# Patient Record
Sex: FEMALE | Race: BLACK OR AFRICAN AMERICAN | NOT HISPANIC OR LATINO | Employment: OTHER | URBAN - METROPOLITAN AREA
[De-identification: names, ages, dates, MRNs, and addresses within clinical notes are randomized per-mention and may not be internally consistent; named-entity substitution may affect disease eponyms.]

---

## 2017-01-16 ENCOUNTER — HOSPITAL ENCOUNTER (OUTPATIENT)
Dept: RADIOLOGY | Facility: HOSPITAL | Age: 52
Discharge: HOME | End: 2017-01-16
Attending: INTERNAL MEDICINE
Payer: MEDICARE

## 2017-01-16 DIAGNOSIS — Z12.39 SCREENING FOR BREAST CANCER: ICD-10-CM

## 2017-01-16 PROCEDURE — 77067 SCR MAMMO BI INCL CAD: CPT | Mod: 26,,, | Performed by: RADIOLOGY

## 2017-01-16 PROCEDURE — 77063 BREAST TOMOSYNTHESIS BI: CPT | Mod: 26,,, | Performed by: RADIOLOGY

## 2017-01-16 PROCEDURE — 77067 SCR MAMMO BI INCL CAD: CPT | Mod: TC

## 2017-01-17 ENCOUNTER — TELEPHONE (OUTPATIENT)
Dept: INTERNAL MEDICINE | Facility: CLINIC | Age: 52
End: 2017-01-17

## 2017-01-18 NOTE — TELEPHONE ENCOUNTER
Spoke with pt and informed her of mmg results and also advised her of pcp rec to repeat mmg in one year.

## 2017-01-23 DIAGNOSIS — Z12.11 ENCOUNTER FOR COLONOSCOPY DUE TO HISTORY OF COLONIC POLYP: Primary | ICD-10-CM

## 2017-01-23 DIAGNOSIS — Z86.010 ENCOUNTER FOR COLONOSCOPY DUE TO HISTORY OF COLONIC POLYP: Primary | ICD-10-CM

## 2017-01-23 RX ORDER — POLYETHYLENE GLYCOL 3350, SODIUM CHLORIDE, SODIUM BICARBONATE, POTASSIUM CHLORIDE 420; 11.2; 5.72; 1.48 G/4L; G/4L; G/4L; G/4L
POWDER, FOR SOLUTION ORAL
Qty: 1 BOTTLE | Refills: 0 | Status: SHIPPED | OUTPATIENT
Start: 2017-01-23 | End: 2017-04-21

## 2017-01-25 ENCOUNTER — ANESTHESIA (OUTPATIENT)
Dept: ENDOSCOPY | Facility: HOSPITAL | Age: 52
End: 2017-01-25
Payer: MEDICARE

## 2017-01-25 ENCOUNTER — ANESTHESIA EVENT (OUTPATIENT)
Dept: ENDOSCOPY | Facility: HOSPITAL | Age: 52
End: 2017-01-25
Payer: MEDICARE

## 2017-01-25 PROBLEM — Z12.11 SCREEN FOR COLON CANCER: Status: ACTIVE | Noted: 2017-01-25

## 2017-01-25 PROCEDURE — D9220A PRA ANESTHESIA: Mod: 33,CRNA,, | Performed by: NURSE ANESTHETIST, CERTIFIED REGISTERED

## 2017-01-25 PROCEDURE — 63600175 PHARM REV CODE 636 W HCPCS: Performed by: NURSE ANESTHETIST, CERTIFIED REGISTERED

## 2017-01-25 PROCEDURE — D9220A PRA ANESTHESIA: Mod: 33,ANES,, | Performed by: ANESTHESIOLOGY

## 2017-01-25 PROCEDURE — 25000003 PHARM REV CODE 250: Performed by: NURSE ANESTHETIST, CERTIFIED REGISTERED

## 2017-01-25 RX ORDER — PROPOFOL 10 MG/ML
VIAL (ML) INTRAVENOUS
Status: DISCONTINUED | OUTPATIENT
Start: 2017-01-25 | End: 2017-01-25

## 2017-01-25 RX ORDER — SODIUM CHLORIDE 9 MG/ML
INJECTION, SOLUTION INTRAVENOUS CONTINUOUS PRN
Status: DISCONTINUED | OUTPATIENT
Start: 2017-01-25 | End: 2017-01-25

## 2017-01-25 RX ORDER — PROPOFOL 10 MG/ML
VIAL (ML) INTRAVENOUS CONTINUOUS PRN
Status: DISCONTINUED | OUTPATIENT
Start: 2017-01-25 | End: 2017-01-25

## 2017-01-25 RX ORDER — LIDOCAINE HCL/PF 100 MG/5ML
SYRINGE (ML) INTRAVENOUS
Status: DISCONTINUED | OUTPATIENT
Start: 2017-01-25 | End: 2017-01-25

## 2017-01-25 RX ADMIN — SODIUM CHLORIDE: 0.9 INJECTION, SOLUTION INTRAVENOUS at 10:01

## 2017-01-25 RX ADMIN — LIDOCAINE HYDROCHLORIDE 100 MG: 20 INJECTION, SOLUTION INTRAVENOUS at 11:01

## 2017-01-25 RX ADMIN — PROPOFOL 150 MCG/KG/MIN: 10 INJECTION, EMULSION INTRAVENOUS at 11:01

## 2017-01-25 RX ADMIN — PROPOFOL 80 MG: 10 INJECTION, EMULSION INTRAVENOUS at 11:01

## 2017-01-25 RX ADMIN — PROPOFOL 30 MG: 10 INJECTION, EMULSION INTRAVENOUS at 11:01

## 2017-01-25 NOTE — ANESTHESIA PREPROCEDURE EVALUATION
01/25/2017  Caitlyn Perez is a 51 y.o., female.    [facility] Anesthesia Evaluation    I have reviewed the Patient Summary Reports.        Review of Systems  Anesthesia Hx:  No problems with previous Anesthesia    Social:  Non-Smoker    Hematology/Oncology:  Hematology Normal   Oncology Normal     EENT/Dental:EENT/Dental Normal   Cardiovascular:   Hypertension    Pulmonary:  Pulmonary Normal    Renal/:  Renal/ Normal     Hepatic/GI:  Hepatic/GI Normal    Musculoskeletal:  Musculoskeletal Normal    Neurological:   CVA    Endocrine:   Diabetes, type 2    Dermatological:  Skin Normal    Psych:  Psychiatric Normal           Physical Exam  General:  Well nourished    Airway/Jaw/Neck:  Airway Findings: Mouth Opening: Normal Tongue: Normal  General Airway Assessment: Adult  Mallampati: II  Improves to II with phonation.  TM Distance: Normal, at least 6 cm  Jaw/Neck Findings:  Neck ROM: Normal ROM      Dental:  Dental Findings: In tact   Chest/Lungs:  Chest/Lungs Findings: Clear to auscultation, Normal Respiratory Rate     Heart/Vascular:  Heart Findings: Rate: Normal  Rhythm: Regular Rhythm  Sounds: Normal             Anesthesia Plan  Type of Anesthesia, risks & benefits discussed:  Anesthesia Type:  general  Patient's Preference: General  Intra-op Monitoring Plan:   Intra-op Monitoring Plan Comments:   Post Op Pain Control Plan:   Post Op Pain Control Plan Comments:   Induction:   IV  Beta Blocker:  Patient is not currently on a Beta-Blocker (No further documentation required).       Informed Consent: Patient understands risks and agrees with Anesthesia plan.  Questions answered. Anesthesia consent signed with patient.  ASA Score: 3     Day of Surgery Review of History & Physical: I have interviewed and examined the patient. I have reviewed the patient's H&P dated:  There are no significant changes.          Ready For  Surgery From Anesthesia Perspective.

## 2017-01-25 NOTE — TRANSFER OF CARE
"Anesthesia Transfer of Care Note    Patient: Caitlyn Perez    Procedure(s) Performed: Procedure(s) (LRB):  COLONOSCOPY (N/A)    Patient location: PACU    Anesthesia Type: general    Transport from OR: Transported from OR on room air with adequate spontaneous ventilation    Post pain: adequate analgesia    Post assessment: no apparent anesthetic complications    Post vital signs: stable    Level of consciousness: sedated    Nausea/Vomiting: no nausea/vomiting    Complications: none          Last vitals:   Visit Vitals    BP (!) 144/81 (BP Location: Left arm, Patient Position: Lying, BP Method: Automatic)    Pulse 84    Temp 36.8 °C (98.2 °F) (Oral)    Resp 18    Ht 5' 4" (1.626 m)    Wt 90.7 kg (200 lb)    SpO2 98%    Breastfeeding No    BMI 34.33 kg/m2     "

## 2017-01-26 NOTE — ANESTHESIA RELEASE NOTE
"Anesthesia Release from PACU Note    Patient: Caitlyn Perez    Procedure(s) Performed: Procedure(s) (LRB):  COLONOSCOPY (N/A)    Anesthesia type: GEN    Post pain: Adequate analgesia reported    Post assessment: no apparent anesthetic complications, tolerated procedure well and no evidence of recall    Post vital signs:   Visit Vitals    /76    Pulse 76    Temp 36.7 °C (98 °F)    Resp 18    Ht 5' 4" (1.626 m)    Wt 90.7 kg (200 lb)    SpO2 100%    Breastfeeding No    BMI 34.33 kg/m2       Level of consciousness: awake, alert and oriented    Nausea/Vomiting: no nausea/no vomiting    Complications: none    Airway Patency: patent    Respiratory: unassisted, spontaneous ventilation, room air    Cardiovascular: stable and blood pressure at baseline    Hydration: euvolemic    "

## 2017-01-31 ENCOUNTER — PATIENT OUTREACH (OUTPATIENT)
Dept: ADMINISTRATIVE | Facility: HOSPITAL | Age: 52
End: 2017-01-31

## 2017-01-31 RX ORDER — AMLODIPINE BESYLATE 10 MG/1
TABLET ORAL
Refills: 0 | COMMUNITY
Start: 2016-12-13 | End: 2017-02-13

## 2017-02-01 ENCOUNTER — TELEPHONE (OUTPATIENT)
Dept: ENDOSCOPY | Facility: HOSPITAL | Age: 52
End: 2017-02-01

## 2017-02-13 ENCOUNTER — OFFICE VISIT (OUTPATIENT)
Dept: INTERNAL MEDICINE | Facility: CLINIC | Age: 52
End: 2017-02-13
Attending: INTERNAL MEDICINE
Payer: MEDICARE

## 2017-02-13 ENCOUNTER — LAB VISIT (OUTPATIENT)
Dept: LAB | Facility: OTHER | Age: 52
End: 2017-02-13
Attending: INTERNAL MEDICINE
Payer: MEDICARE

## 2017-02-13 VITALS
HEIGHT: 64 IN | DIASTOLIC BLOOD PRESSURE: 88 MMHG | SYSTOLIC BLOOD PRESSURE: 128 MMHG | OXYGEN SATURATION: 99 % | WEIGHT: 208.56 LBS | BODY MASS INDEX: 35.61 KG/M2 | HEART RATE: 80 BPM

## 2017-02-13 DIAGNOSIS — Z00.00 ANNUAL PHYSICAL EXAM: Primary | ICD-10-CM

## 2017-02-13 DIAGNOSIS — Z86.73 HISTORY OF CVA (CEREBROVASCULAR ACCIDENT): ICD-10-CM

## 2017-02-13 DIAGNOSIS — E66.09 NON MORBID OBESITY DUE TO EXCESS CALORIES: ICD-10-CM

## 2017-02-13 DIAGNOSIS — E78.00 PURE HYPERCHOLESTEROLEMIA: ICD-10-CM

## 2017-02-13 DIAGNOSIS — E21.3 HYPERPARATHYROIDISM: ICD-10-CM

## 2017-02-13 DIAGNOSIS — R35.0 FREQUENCY OF URINATION: ICD-10-CM

## 2017-02-13 DIAGNOSIS — G81.10 SPASTIC HEMIPARESIS AFFECTING DOMINANT SIDE: ICD-10-CM

## 2017-02-13 DIAGNOSIS — R26.9 ABNORMALITY OF GAIT: ICD-10-CM

## 2017-02-13 DIAGNOSIS — G81.10 SPASTIC HEMIPARESIS: ICD-10-CM

## 2017-02-13 DIAGNOSIS — I69.30 HISTORY OF CVA WITH RESIDUAL DEFICIT: ICD-10-CM

## 2017-02-13 DIAGNOSIS — R79.89 ELEVATED LFTS: ICD-10-CM

## 2017-02-13 DIAGNOSIS — R73.03 PREDIABETES: ICD-10-CM

## 2017-02-13 DIAGNOSIS — I10 ESSENTIAL HYPERTENSION: ICD-10-CM

## 2017-02-13 LAB
ALBUMIN SERPL BCP-MCNC: 3.7 G/DL
ALP SERPL-CCNC: 175 U/L
ALT SERPL W/O P-5'-P-CCNC: 57 U/L
ANION GAP SERPL CALC-SCNC: 10 MMOL/L
AST SERPL-CCNC: 35 U/L
BILIRUB SERPL-MCNC: 0.5 MG/DL
BUN SERPL-MCNC: 14 MG/DL
CALCIUM SERPL-MCNC: 10.1 MG/DL
CHLORIDE SERPL-SCNC: 107 MMOL/L
CO2 SERPL-SCNC: 23 MMOL/L
CREAT SERPL-MCNC: 1.1 MG/DL
EST. GFR  (AFRICAN AMERICAN): >60 ML/MIN/1.73 M^2
EST. GFR  (NON AFRICAN AMERICAN): 57.9 ML/MIN/1.73 M^2
GLUCOSE SERPL-MCNC: 70 MG/DL
POTASSIUM SERPL-SCNC: 4.2 MMOL/L
PROT SERPL-MCNC: 7.1 G/DL
SODIUM SERPL-SCNC: 140 MMOL/L

## 2017-02-13 PROCEDURE — 3079F DIAST BP 80-89 MM HG: CPT | Mod: S$GLB,,, | Performed by: INTERNAL MEDICINE

## 2017-02-13 PROCEDURE — 36415 COLL VENOUS BLD VENIPUNCTURE: CPT

## 2017-02-13 PROCEDURE — 3074F SYST BP LT 130 MM HG: CPT | Mod: S$GLB,,, | Performed by: INTERNAL MEDICINE

## 2017-02-13 PROCEDURE — 99999 PR PBB SHADOW E&M-EST. PATIENT-LVL III: CPT | Mod: PBBFAC,,, | Performed by: INTERNAL MEDICINE

## 2017-02-13 PROCEDURE — 99396 PREV VISIT EST AGE 40-64: CPT | Mod: S$GLB,,, | Performed by: INTERNAL MEDICINE

## 2017-02-13 PROCEDURE — 80053 COMPREHEN METABOLIC PANEL: CPT

## 2017-02-13 PROCEDURE — 99499 UNLISTED E&M SERVICE: CPT | Mod: S$GLB,,, | Performed by: INTERNAL MEDICINE

## 2017-02-13 RX ORDER — VALSARTAN 160 MG/1
160 TABLET ORAL DAILY
Qty: 90 TABLET | Refills: 2 | Status: SHIPPED | OUTPATIENT
Start: 2017-02-13 | End: 2017-09-12 | Stop reason: SDUPTHER

## 2017-02-13 RX ORDER — PRAVASTATIN SODIUM 40 MG/1
40 TABLET ORAL NIGHTLY
Qty: 90 TABLET | Refills: 2 | Status: SHIPPED | OUTPATIENT
Start: 2017-02-13 | End: 2017-04-21

## 2017-02-13 NOTE — MR AVS SNAPSHOT
[facility]  [address]  [facility] [address]  Phone: [phone]  Fax: [phone]                  Caitlyn Perez   2017 9:40 AM   Office Visit    Description:  Female : 1965   Provider:  Jeanette Delacruz MD   Department:  [facility]           Reason for Visit     Annual Exam           Diagnoses this Visit        Comments    Annual physical exam    -  Primary     Spastic hemiparesis affecting dominant side         History of CVA (cerebrovascular accident)         Prediabetes         Non morbid obesity due to excess calories         Hyperparathyroidism         Pure hypercholesterolemia         Essential hypertension         Spastic hemiparesis         History of CVA with residual deficit         Abnormality of gait         Obesity, Class II, BMI 35-39.9, with comorbidity         Frequency of urination         Elevated LFTs                To Do List           Future Appointments        Provider Department Dept Phone    2017 10:15 AM LAB, [facility] [phone]    2017 10:30 AM LAB, [facility] [phone]    2017 9:00 AM MD Garret Blanco [facility] - Endo/Diab/Metab [phone]    2017 10:40 AM MD Garret Fierro [facility] [phone]      Goals (5 Years of Data)     None       These Medications        Disp Refills Start End    pravastatin (PRAVACHOL) 40 MG tablet 90 tablet 2 2017     Take 1 tablet (40 mg total) by mouth every evening. - Oral    Pharmacy: [facility] [address] Ph #: [phone]       valsartan (DIOVAN) 160 MG tablet 90 tablet 2 2017     Take 1 tablet (160 mg total) by mouth once daily. - Oral    Pharmacy: [facility] [address] Ph #: [phone]         [facility]      "[facility] Nurse Care [facility] - 24/7 Assistance  Registered nurses in the [facility] provide clinical advisement, health education, appointment booking, and other advisory services.  Call for this free service at [phone].             Medications           Message regarding Medications     Verify the changes and/or additions to your medication regime listed below are the same as discussed with your clinician today.  If any of these changes or additions are incorrect, please notify your healthcare provider.        STOP taking these medications     amlodipine (NORVASC) 10 MG tablet            Verify that the below list of medications is an accurate representation of the medications you are currently taking.  If none reported, the list may be blank. If incorrect, please contact your healthcare provider. Carry this list with you in case of emergency.           Current Medications     aspirin (ECOTRIN) 81 MG EC tablet Take 81 mg by mouth once daily.    bisacodyl (DULCOLAX) 5 mg EC tablet Take 5 mg by mouth daily as needed for Constipation.    blood sugar diagnostic Strp True Test blood glucose test strips & lancets. Check blood sugars daily. 3 month supply.    CYANOCOBALAMIN, VITAMIN B-12, (VITAMIN B-12 ORAL) Take 1 tablet by mouth once daily.    KRILL OIL ORAL Take 1 capsule by mouth once daily.    MULTIVITAMIN W-MINERALS/LUTEIN (CENTRUM SILVER ORAL) Take 1 tablet by mouth once daily.    peg-electrolyte soln (TRILYTE WITH FLAVOR PACKETS) 420 gram SolR As directed    pravastatin (PRAVACHOL) 40 MG tablet Take 1 tablet (40 mg total) by mouth every evening.    valsartan (DIOVAN) 160 MG tablet Take 1 tablet (160 mg total) by mouth once daily.           Clinical Reference Information           Your Vitals Were     BP Pulse Height Weight SpO2 BMI    128/88 (BP Location: Left arm, Patient Position: Sitting, BP Method: Manual) 80 5' 4" (1.626 m) 94.6 kg (208 lb 8.9 oz) 99% 35.8 kg/m2      Blood Pressure       "    Most Recent Value    BP  128/88      Allergies as of 2/13/2017     No Known Allergies      Immunizations Administered on Date of Encounter - 2/13/2017     None      Orders Placed During Today's Visit      Normal Orders This Visit    Ambulatory consult to Endocrinology     Ambulatory referral to Physical Medicine Rehab     Urine culture     Future Labs/Procedures Expected by Expires    Comprehensive metabolic panel  2/13/2017 2/13/2018    PTH, intact  2/13/2017 4/14/2018    Urinalysis  2/13/2017 4/14/2018    Urine culture  2/13/2017 4/14/2018    Urinalysis Microscopic  As directed 2/13/2018      [facility] Sign-Up     Activating your [facility] account is as easy as 1-2-3!     1) Visit [email or web address], select Sign Up Now, enter this activation code and your date of birth, then select Next.  [email or web address]  Expires: 3/11/2017 10:27 AM      2) Create a username and password to use when you visit [facility] in the future and select a security question in case you lose your password and select Next.    3) Enter your e-mail address and click Sign Up!    Additional Information  If you have questions, please e-mail [email or web address] or call [phone] to talk to our [facility] staff. Remember, [facility] is NOT to be used for urgent needs. For medical emergencies, dial 911.         Language Assistance Services     ATTENTION: Language assistance services are available, free of charge. Please call [phone].      ATENCIÓN: Si habla español, tiene a singer disposición servicios gratuitos de asistencia lingüística. Llame al [phone].     CHÚ Ý: N?u b?n nói Ti?ng Vi?t, có các d?ch v? h? tr? ngôn ng? mi?n phí dành cho b?n. G?i s? [phone].         [facility] complies with applicable Federal civil rights laws and does not discriminate on the basis of race, color, national origin, age, disability, or sex.

## 2017-02-13 NOTE — PROGRESS NOTES
"Subjective:       Patient ID: Caitlyn Perez is a 51 y.o. female.    Chief Complaint: Annual Exam     Caitlyn Perez is a 51 y.o.  female who presents for Annual Exam  .  Patient Active Problem List   Diagnosis    Essential hypertension    Pure hypercholesterolemia    Hyperparathyroidism    Prediabetes    Peripheral neuropathy    History of abnormal Pap smear    Spastic hemiparesis    Central pain syndrome    Abnormality of gait    Obesity, Class II, BMI 35-39.9, with comorbidity    Screen for colon cancer    History of CVA with residual deficit    Elevated LFTs     HPI Comments: History of hyperparathyroidism. Last seen by octavio in 2015, did not keep follow up apt. At that time not candidate for surgery.    C/o odor "from my body" has been an issue for several years.  No douching. Not sexually active.  + frequency and occasional urge incontinence.   History of prediabetes- exercising and attempting to follow healthy diet. Gradually increasing hba1c, last 6.1.     History of CVA with residual left sided spastic hemiparesis, left upper extremity. Concerned that her hand is starting to "curl up". Has not seen PMR in several years. No falls, rides a bike.  Hypertension   This is a chronic problem. The problem is unchanged. The problem is controlled. Pertinent negatives include no chest pain, orthopnea, palpitations, PND or shortness of breath. Risk factors for coronary artery disease include sedentary lifestyle, obesity, post-menopausal state and dyslipidemia. Past treatments include angiotensin blockers. The current treatment provides significant improvement. Compliance problems include psychosocial issues.  Hypertensive end-organ damage includes CVA.   Hyperlipidemia   This is a chronic problem. The current episode started more than 1 year ago. The problem is controlled. Pertinent negatives include no chest pain, myalgias or shortness of breath. Current antihyperlipidemic treatment includes statins. Risk " factors for coronary artery disease include post-menopausal, a sedentary lifestyle, obesity and dyslipidemia.     Health Maintenance       Date Due Completion Date    Pneumococcal PPSV23 (Medium Risk) (1) 1982 ---    Hemoglobin A1c 2016    Pap Smear 2017    Eye Exam 2017 (Done)    Override on 2016: Done    Override on 2014: Done (seen by Dr. Salomon Romero, per note faxed to office)    Foot Exam 2017 (Done)    Override on 2016: Done    Override on 10/14/2015: Done    Lipid Panel 2017    Mammogram 2018    Colonoscopy 2020    Override on 2014: Done    TETANUS VACCINE 2026          Review of Systems   Constitutional: Negative for chills and fever.   Respiratory: Negative for cough and shortness of breath.    Cardiovascular: Negative for chest pain, palpitations, orthopnea and PND.   Gastrointestinal: Negative for nausea and vomiting.   Genitourinary: Negative for dysuria and hematuria.        + frequency, chronic   Musculoskeletal: Negative for myalgias.         Past Medical History:   Diagnosis Date    Aneurysm     CVA (cerebral infarction) 2012    Diabetes mellitus, type 2     Family history of breast cancer     Hypertension     Parathyroid disease     Stroke     Lt sided weakness       Past Surgical History:   Procedure Laterality Date     SECTION      x 4    COLONOSCOPY N/A 2016    Procedure: COLONOSCOPY;  Surgeon: Gio Bettencourt MD;  Location: [address]);  Service: Endoscopy;  Laterality: N/A;    COLONOSCOPY N/A 2017    Procedure: COLONOSCOPY;  Surgeon: Gio Bettencourt MD;  Location: [facility] ENDO ([address] [place] [address]);  Service: Endoscopy;  Laterality: N/A;    TUBAL LIGATION         Family History   Problem Relation Age of Onset    Diabetes type II Mother     Kidney disease Mother      on HD due to diabetes    Hypertension Mother   "   Diabetes Mother     Breast cancer Father     Breast cancer Paternal Uncle     Breast cancer Paternal Aunt 17    Lung cancer Maternal Aunt     Diabetes Maternal Aunt     Diabetes Brother     Diabetes Maternal Uncle     Diabetes Maternal Aunt     Diabetes Maternal Aunt     Diabetes Maternal Aunt     Diabetes Maternal Uncle     Diabetes Maternal Uncle     Diabetes Maternal Uncle        Social History   Substance Use Topics    Smoking status: Never Smoker    Smokeless tobacco: Never Used    Alcohol use No             Objective:   Blood pressure 128/88, pulse 80, height 5' 4" (1.626 m), weight 94.6 kg (208 lb 8.9 oz), SpO2 99 %.     Physical Exam   Constitutional: She is oriented to person, place, and time. She appears well-developed and well-nourished. No distress.   HENT:   Head: Normocephalic and atraumatic.   Right Ear: External ear normal.   Left Ear: External ear normal.   Eyes: Conjunctivae are normal. No scleral icterus.   Neck: No JVD present. No thyromegaly present.   Cardiovascular: Normal heart sounds.  Exam reveals no gallop and no friction rub.    No murmur heard.  Pulmonary/Chest: Effort normal and breath sounds normal. She has no wheezes. She has no rales.   Abdominal: Soft. Bowel sounds are normal. She exhibits no distension. There is no tenderness.   Musculoskeletal: She exhibits no edema or tenderness.   Lymphadenopathy:     She has no cervical adenopathy.   Neurological: She is alert and oriented to person, place, and time.   Skin: Skin is warm and dry.   Psychiatric: She has a normal mood and affect. Thought content normal.       Prior labs reviewed  Assessment/Plan:        Caitlyn was seen today for annual exam.    Diagnoses and all orders for this visit:    Annual physical exam  Recommend daily sunscreen, cardiovascular exercise min 30 min 5 days per week. Seatbelts routinely.    Spastic hemiparesis affecting dominant side  -     Ambulatory referral to Physical Medicine " Rehab    History of CVA (cerebrovascular accident)  -     Ambulatory referral to Physical Medicine Rehab    Hyperparathyroidism  -     Comprehensive metabolic panel; Future  -     PTH, intact; Future  -     Ambulatory consult to Endocrinology    Essential hypertension  Well controlled  Cont current BP medication(s) and low salt diet    Prediabetes  Non morbid obesity due to excess calories  - rec diet and exercise  - increase intensity and duration of CV exercise to continue weight loss  - goal wt loss one pound per week  - portion control, healthy choices      Pure hypercholesterolemia  -  well controlled  - cont current medication  - recommend low cholesterol diet and regular cardiovascular exercise to reduce risk of cardiovascular events  - repeat lipid profile and CMP annually    Spastic hemiparesis  As above    History of CVA with residual deficit    Abnormality of gait    Obesity, Class II, BMI 35-39.9, with comorbidity    Frequency of urination  -     Urine culture; Future  -     Urinalysis; Future  -     Urinalysis Microscopic; Future  -     Urine culture  -     Urinalysis    Elevated LFTs  monitor  Other orders  -     pravastatin (PRAVACHOL) 40 MG tablet; Take 1 tablet (40 mg total) by mouth every evening.  -     valsartan (DIOVAN) 160 MG tablet; Take 1 tablet (160 mg total) by mouth once daily.  -     Urinalysis Microscopic

## 2017-02-14 ENCOUNTER — TELEPHONE (OUTPATIENT)
Dept: INTERNAL MEDICINE | Facility: CLINIC | Age: 52
End: 2017-02-14

## 2017-02-14 LAB
BACTERIA #/AREA URNS AUTO: NORMAL /HPF
BILIRUB UR QL STRIP: NEGATIVE
CLARITY UR REFRACT.AUTO: CLEAR
COLOR UR AUTO: YELLOW
GLUCOSE UR QL STRIP: NEGATIVE
HGB UR QL STRIP: NEGATIVE
HYALINE CASTS UR QL AUTO: 0 /LPF
KETONES UR QL STRIP: NEGATIVE
LEUKOCYTE ESTERASE UR QL STRIP: NEGATIVE
MICROSCOPIC COMMENT: NORMAL
NITRITE UR QL STRIP: NEGATIVE
PH UR STRIP: 6 [PH] (ref 5–8)
PROT UR QL STRIP: ABNORMAL
RBC #/AREA URNS AUTO: 1 /HPF (ref 0–4)
SP GR UR STRIP: 1.02 (ref 1–1.03)
SQUAMOUS #/AREA URNS AUTO: 1 /HPF
URN SPEC COLLECT METH UR: ABNORMAL
UROBILINOGEN UR STRIP-ACNC: NEGATIVE EU/DL
WBC #/AREA URNS AUTO: 2 /HPF (ref 0–5)

## 2017-02-14 NOTE — TELEPHONE ENCOUNTER
Spoke to Jennifer in labs states that Diamante spoke to Kunal and the issue was resolved    No further questions or concerns at this time

## 2017-02-15 LAB — BACTERIA UR CULT: NORMAL

## 2017-02-16 ENCOUNTER — TELEPHONE (OUTPATIENT)
Dept: INTERNAL MEDICINE | Facility: CLINIC | Age: 52
End: 2017-02-16

## 2017-02-16 RX ORDER — SULFAMETHOXAZOLE AND TRIMETHOPRIM 800; 160 MG/1; MG/1
1 TABLET ORAL 2 TIMES DAILY
Qty: 6 TABLET | Refills: 0 | Status: SHIPPED | OUTPATIENT
Start: 2017-02-16 | End: 2017-02-19

## 2017-02-16 NOTE — TELEPHONE ENCOUNTER
Please let pt know that her urine tests did show a small amount of bacteria but no infection. Because she is having symptoms we should treat with antibiotics.

## 2017-02-21 NOTE — TELEPHONE ENCOUNTER
Left a message on pt's voicemail informing pt of urine results and antibiotic rx.    Pt has no further questions at this time.

## 2017-04-20 ENCOUNTER — TELEPHONE (OUTPATIENT)
Dept: OBSTETRICS AND GYNECOLOGY | Facility: CLINIC | Age: 52
End: 2017-04-20

## 2017-04-21 ENCOUNTER — OFFICE VISIT (OUTPATIENT)
Dept: OBSTETRICS AND GYNECOLOGY | Facility: CLINIC | Age: 52
End: 2017-04-21
Payer: MEDICARE

## 2017-04-21 VITALS
WEIGHT: 206.81 LBS | DIASTOLIC BLOOD PRESSURE: 74 MMHG | SYSTOLIC BLOOD PRESSURE: 140 MMHG | HEIGHT: 64 IN | BODY MASS INDEX: 35.31 KG/M2

## 2017-04-21 DIAGNOSIS — N89.8 VAGINAL ODOR: Primary | ICD-10-CM

## 2017-04-21 LAB
CANDIDA RRNA VAG QL PROBE: NEGATIVE
G VAGINALIS RRNA GENITAL QL PROBE: NEGATIVE
T VAGINALIS RRNA GENITAL QL PROBE: NEGATIVE

## 2017-04-21 PROCEDURE — 99999 PR PBB SHADOW E&M-EST. PATIENT-LVL III: CPT | Mod: PBBFAC,,, | Performed by: ADVANCED PRACTICE MIDWIFE

## 2017-04-21 PROCEDURE — 99213 OFFICE O/P EST LOW 20 MIN: CPT | Mod: S$GLB,,, | Performed by: ADVANCED PRACTICE MIDWIFE

## 2017-04-21 PROCEDURE — 3077F SYST BP >= 140 MM HG: CPT | Mod: S$GLB,,, | Performed by: ADVANCED PRACTICE MIDWIFE

## 2017-04-21 PROCEDURE — 1160F RVW MEDS BY RX/DR IN RCRD: CPT | Mod: S$GLB,,, | Performed by: ADVANCED PRACTICE MIDWIFE

## 2017-04-21 PROCEDURE — 3078F DIAST BP <80 MM HG: CPT | Mod: S$GLB,,, | Performed by: ADVANCED PRACTICE MIDWIFE

## 2017-04-21 PROCEDURE — 87480 CANDIDA DNA DIR PROBE: CPT

## 2017-04-21 RX ORDER — TRAMADOL HYDROCHLORIDE 50 MG/1
TABLET ORAL
Refills: 0 | COMMUNITY
Start: 2017-04-10 | End: 2018-01-31

## 2017-04-21 RX ORDER — HYDROCODONE BITARTRATE AND ACETAMINOPHEN 5; 325 MG/1; MG/1
TABLET ORAL
Refills: 0 | COMMUNITY
Start: 2017-04-06 | End: 2018-01-17

## 2017-04-21 RX ORDER — NAPROXEN 375 MG/1
TABLET ORAL
Refills: 0 | COMMUNITY
Start: 2017-04-10 | End: 2018-01-31

## 2017-04-21 NOTE — MR AVS SNAPSHOT
[facility] [address]  [address]  [address]  [facility] [address]  Phone: [phone]                  aCitlyn Perez   2017 3:45 PM   Office Visit    Description:  Female : 1965   Provider:  Snow Will CNM   Department:  [facility] [address]           Reason for Visit     Vaginal Discharge                To Do List           Future Appointments        Provider Department Dept Phone    2017 3:45 PM Snow Will CNM [facility] [address] [phone]      Goals (5 Years of Data)     None      [facility]     [facility] On Call Nurse Care Line -  Assistance  Unless otherwise directed by your provider, please contact [facility], our nurse care line that is available for  assistance.     Registered nurses in the [facility] On Call Center provide: appointment scheduling, clinical advisement, health education, and other advisory services.  Call: [phone] (toll free)               Medications           Message regarding Medications     Verify the changes and/or additions to your medication regime listed below are the same as discussed with your clinician today.  If any of these changes or additions are incorrect, please notify your healthcare provider.        STOP taking these medications     pravastatin (PRAVACHOL) 40 MG tablet Take 1 tablet (40 mg total) by mouth every evening.    peg-electrolyte soln (TRILYTE WITH FLAVOR PACKETS) 420 gram SolR As directed           Verify that the below list of medications is an accurate representation of the medications you are currently taking.  If none reported, the list may be blank. If incorrect, please contact your healthcare provider. Carry this list with you in case of emergency.           Current Medications     aspirin (ECOTRIN) 81 MG EC tablet Take 81 mg by mouth once daily.    bisacodyl (DULCOLAX) 5 mg EC tablet Take 5 mg by mouth daily as needed for Constipation.    blood sugar diagnostic Strp True Test  "blood glucose test strips & lancets. Check blood sugars daily. 3 month supply.    CYANOCOBALAMIN, VITAMIN B-12, (VITAMIN B-12 ORAL) Take 1 tablet by mouth once daily.    hydrocodone-acetaminophen 5-325mg (NORCO) 5-325 mg per tablet TK 1 T PO  Q 6 H PRF PAIN    KRILL OIL ORAL Take 1 capsule by mouth once daily.    MULTIVITAMIN W-MINERALS/LUTEIN (CENTRUM SILVER ORAL) Take 1 tablet by mouth once daily.    naproxen (NAPROSYN) 375 MG tablet TK ONE T PO TID WITH FOOD PRF PAIN    tramadol (ULTRAM) 50 mg tablet TK 1  T   PO Q 6 HOURS PRN FOR PAIN    valsartan (DIOVAN) 160 MG tablet Take 1 tablet (160 mg total) by mouth once daily.           Clinical Reference Information           Your Vitals Were     BP Height Weight BMI       140/74 5' 4" (1.626 m) 93.8 kg (206 lb 12.7 oz) 35.5 kg/m2       Blood Pressure          Most Recent Value    BP  (!)  140/74      Allergies as of 4/21/2017     No Known Allergies      Immunizations Administered on Date of Encounter - 4/21/2017     None      [facility] Sign-Up     Activating your [facility] account is as easy as 1-2-3!     1) Visit [email or web address], select Sign Up Now, enter this activation code and your date of birth, then select Next.  [email or web address]  Expires: 6/5/2017  2:54 PM      2) Create a username and password to use when you visit [facility] in the future and select a security question in case you lose your password and select Next.    3) Enter your e-mail address and click Sign Up!    Additional Information  If you have questions, please e-mail [email or web address] or call [phone] to talk to our [facility] staff. Remember, [facility] is NOT to be used for urgent needs. For medical emergencies, dial 911.         Language Assistance Services     ATTENTION: Language assistance services are available, free of charge. Please call [phone].      ATENCIÓN: Si habla español, tiene a singer disposición servicios gratuitos de asistencia lingüística. Llame al " [phone].     [place] Ý: N?u b?n nói Ti?ng Vi?t, có các d?ch v? h? tr? ngôn ng? mi?n phí dành cho b?n. G?i s? [phone].         [facility] [address] complies with applicable Federal civil rights laws and does not discriminate on the basis of race, color, national origin, age, disability, or sex.

## 2017-04-23 NOTE — PROGRESS NOTES
Caitlyn Perez is a 51 y.o. female  [facility] with complaint of vaginal odor for 1 week.  She denies itching, and reports odor.  She states the discharge is clear.          ROS:  GENERAL: No fever, chills, fatigability or weight loss.  VULVAR: No pain, no lesions and no itching.  VAGINAL: No relaxation, no abnormal bleeding and no lesions. Reports discharge and odor  ABDOMEN: No abdominal pain. Denies nausea. Denies vomiting. No diarrhea. No constipation  BREAST: Denies pain. No lumps. No discharge.  URINARY: No incontinence, no nocturia, no frequency and no dysuria.  CARDIOVASCULAR: No chest pain. No shortness of breath. No leg cramps.  NEUROLOGICAL: No headaches. No vision changes.      Review of patient's allergies indicates:  No Known Allergies    Current Outpatient Prescriptions:     aspirin (ECOTRIN) 81 MG EC tablet, Take 81 mg by mouth once daily., Disp: , Rfl:     bisacodyl (DULCOLAX) 5 mg EC tablet, Take 5 mg by mouth daily as needed for Constipation., Disp: , Rfl:     CYANOCOBALAMIN, VITAMIN B-12, (VITAMIN B-12 ORAL), Take 1 tablet by mouth once daily., Disp: , Rfl:     hydrocodone-acetaminophen 5-325mg (NORCO) 5-325 mg per tablet, TK 1 T PO  Q 6 H PRF PAIN, Disp: , Rfl: 0    KRILL OIL ORAL, Take 1 capsule by mouth once daily., Disp: , Rfl:     MULTIVITAMIN W-MINERALS/LUTEIN (CENTRUM SILVER ORAL), Take 1 tablet by mouth once daily., Disp: , Rfl:     naproxen (NAPROSYN) 375 MG tablet, TK ONE T PO TID WITH FOOD PRF PAIN, Disp: , Rfl: 0    tramadol (ULTRAM) 50 mg tablet, TK 1  T   PO Q 6 HOURS PRN FOR PAIN, Disp: , Rfl: 0    valsartan (DIOVAN) 160 MG tablet, Take 1 tablet (160 mg total) by mouth once daily., Disp: 90 tablet, Rfl: 2    blood sugar diagnostic Strp, True Test blood glucose test strips & lancets. Check blood sugars daily. 3 month supply., Disp: 100 each, Rfl: 3    Past Medical History:   Diagnosis Date    Aneurysm     CVA (cerebral infarction) 2012     "Diabetes mellitus, type 2     Family history of breast cancer     Hypertension     Parathyroid disease     Stroke     Lt sided weakness     Past Surgical History:   Procedure Laterality Date     SECTION      x 4    COLONOSCOPY N/A 2016    Procedure: COLONOSCOPY;  Surgeon: Gio Bettencourt MD;  [facility]  Service: Endoscopy;  Laterality: N/A;    COLONOSCOPY N/A 2017    Procedure: COLONOSCOPY;  Surgeon: Gio Bettencourt MD;  [facility]  Service: Endoscopy;  Laterality: N/A;    TUBAL LIGATION       Social History   Substance Use Topics    Smoking status: Never Smoker    Smokeless tobacco: Never Used    Alcohol use No     OB History    Para Term  AB SAB TAB Ectopic Multiple Living   6 6       1 6      # Outcome Date GA Lbr Dougie/2nd Weight Sex Delivery Anes PTL Lv   6 Para            5 Para            4 Para            3 Para            2 Para            1 Para                   BP (!) 140/74  Ht 5' 4" (1.626 m)  Wt 93.8 kg (206 lb 12.7 oz)  BMI 35.5 kg/m2    PHYSICAL EXAM:  GENERAL: Calm and appropriate affect, alert, oriented x4  SKIN: Color appropriate for race, warm and dry, clean and intact with no rashes.  RESP: Even, unlabored breathing  ABDOMEN: Soft, nontender, no masses.  :   Normal external female genitalia without lesions. Normal hair distribution. Adequate perineal body, normal urethral meatus.  Vagina pink and well rugated, no lesions, vaginal discharge - thin, gray, minimal odor  No significant cystocele or rectocele.  Cervix pink without discharge or lesions, no cervical motion tenderness.  Uterus 4-6 weeks size, regular, mobile and nontender.  Adnexa: normal adnexa in size, nontender and no masses      ASSESSMENT / PLAN:    ICD-10-CM ICD-9-CM    1. Vaginal odor N89.8 625.8 Vaginosis Screen by DNA Probe     Vaginal odor  -     Vaginosis Screen by DNA Probe          Patient was counseled today on vaginitis prevention " including :  a. avoiding feminine products such as deoderant soaps, body wash, bubble bath, douches, scented toilet paper, deoderant tampons or pads, feminine wipes, chronic pad use, etc.  b. avoiding other vulvovaginal irritants such as long hot baths, humidity, tight, synthetic clothing, chlorine and sitting around in wet bathing suits  c. wearing cotton underwear, avoiding thong underwear and no underwear to bed  d. taking showers instead of baths and use a hair dryer on cool setting afterwards to dry  e. wearing cotton to exercise and shower immediately after exercise and change clothes  f. using polyurethane condoms without spermicide if sexually active and symptoms are triggered by intercourse  g. Discussed use of vagisil, along with repHresh and probiotics    FOLLOW UP:   Pending lab results, PRN lack of improvement.

## 2017-04-26 ENCOUNTER — TELEPHONE (OUTPATIENT)
Dept: OBSTETRICS AND GYNECOLOGY | Facility: CLINIC | Age: 52
End: 2017-04-26

## 2017-04-28 ENCOUNTER — TELEPHONE (OUTPATIENT)
Dept: OBSTETRICS AND GYNECOLOGY | Facility: HOSPITAL | Age: 52
End: 2017-04-28

## 2017-05-01 ENCOUNTER — TELEPHONE (OUTPATIENT)
Dept: OBSTETRICS AND GYNECOLOGY | Facility: CLINIC | Age: 52
End: 2017-05-01

## 2017-05-01 NOTE — TELEPHONE ENCOUNTER
----- Message from Rebeca Bui sent at 5/1/2017  3:23 PM CDT -----  Contact: Patient  X _1st Request  _  2nd Request  _  3rd Request    Who:ANDRES SHORT [[id number]]    Why:Patient states she needs to speak with the  concerning a D& C     What Number to Call Back:[phone]    When to Expect a call back: (Before the end of the day)   -- if call after 3:00 call back will be tomorrow.

## 2017-05-01 NOTE — TELEPHONE ENCOUNTER
Ref Range & Units 10d ago     Trichomonas vaginalis Negative Negative   Gardnerella vaginalis Negative Negative   Candida sp Negative Negative             Pt notified, asked why she has vaginal odor, instructed patient to f/u with Primary GYN

## 2017-05-01 NOTE — TELEPHONE ENCOUNTER
----- Message from Rik Leon sent at 5/1/2017  2:19 PM CDT -----  Contact: Pt  X_ 1st Request  _ 2nd Request  _ 3rd Request    Who:ANDRES SHORT [[id number]]    Why: Patient would like to speak with staff in regard to test result    What Number to Call Back: [phone]    When to Expect a call back: (Before the end of the day)  -- if call after 3:00 call back will be tomorrow.

## 2017-05-03 ENCOUNTER — TELEPHONE (OUTPATIENT)
Dept: INTERNAL MEDICINE | Facility: CLINIC | Age: 52
End: 2017-05-03

## 2017-05-03 DIAGNOSIS — R79.89 ELEVATED LFTS: Primary | ICD-10-CM

## 2017-05-03 DIAGNOSIS — E21.0 PRIMARY HYPERPARATHYROIDISM: ICD-10-CM

## 2017-06-07 ENCOUNTER — HOSPITAL ENCOUNTER (OUTPATIENT)
Dept: RADIOLOGY | Facility: OTHER | Age: 52
Discharge: HOME | End: 2017-06-07
Attending: INTERNAL MEDICINE
Payer: MEDICARE

## 2017-06-07 DIAGNOSIS — R79.89 ELEVATED LFTS: ICD-10-CM

## 2017-06-07 PROCEDURE — 76705 ECHO EXAM OF ABDOMEN: CPT | Mod: TC

## 2017-06-07 PROCEDURE — 76705 ECHO EXAM OF ABDOMEN: CPT | Mod: 26,,, | Performed by: RADIOLOGY

## 2017-06-12 ENCOUNTER — TELEPHONE (OUTPATIENT)
Dept: INTERNAL MEDICINE | Facility: CLINIC | Age: 52
End: 2017-06-12

## 2017-06-12 DIAGNOSIS — R79.89 ELEVATED LFTS: Primary | ICD-10-CM

## 2017-06-12 DIAGNOSIS — R16.0 HEPATOMEGALY: ICD-10-CM

## 2017-06-12 NOTE — TELEPHONE ENCOUNTER
----- Message from aSlly Jennings sent at 6/12/2017  9:04 AM CDT -----  Contact: Self  Pt is calling in regards of getting her US results if possible. The pt can be reached at [phone]. Thanks kG

## 2017-06-12 NOTE — TELEPHONE ENCOUNTER
----- Message from Sally Jennings sent at 6/12/2017  9:04 AM CDT -----  Contact: Self  Pt is calling in regards of getting her US results if possible. The pt can be reached at [phone]. Thanks kG

## 2017-06-12 NOTE — TELEPHONE ENCOUNTER
Results show gall stones and mild enlargement of liver. Needs to have labs I ordered done and needs to follow up with [facility].

## 2017-06-12 NOTE — TELEPHONE ENCOUNTER
Pt requesting for US results and for them to be explained on what the results mean .    Please authorize and advise

## 2017-06-13 NOTE — TELEPHONE ENCOUNTER
Pt has been informed of pcp's rec. Pt states that she will return call to office when she is back in town to schedule labs and appointment with [facility].

## 2017-09-08 ENCOUNTER — OFFICE VISIT (OUTPATIENT)
Dept: INTERNAL MEDICINE | Facility: CLINIC | Age: 52
End: 2017-09-08
Attending: INTERNAL MEDICINE
Payer: MEDICARE

## 2017-09-08 ENCOUNTER — LAB VISIT (OUTPATIENT)
Dept: LAB | Facility: OTHER | Age: 52
End: 2017-09-08
Attending: INTERNAL MEDICINE
Payer: MEDICARE

## 2017-09-08 VITALS
HEIGHT: 64 IN | HEART RATE: 70 BPM | SYSTOLIC BLOOD PRESSURE: 130 MMHG | OXYGEN SATURATION: 99 % | DIASTOLIC BLOOD PRESSURE: 90 MMHG | BODY MASS INDEX: 34.1 KG/M2 | WEIGHT: 199.75 LBS

## 2017-09-08 DIAGNOSIS — R73.03 PREDIABETES: ICD-10-CM

## 2017-09-08 DIAGNOSIS — Q64.9 URINARY ANOMALY: ICD-10-CM

## 2017-09-08 DIAGNOSIS — E11.9 TYPE 2 DIABETES MELLITUS WITHOUT COMPLICATION: ICD-10-CM

## 2017-09-08 DIAGNOSIS — Q64.9 URINARY ANOMALY: Primary | ICD-10-CM

## 2017-09-08 DIAGNOSIS — R79.89 ELEVATED LFTS: ICD-10-CM

## 2017-09-08 DIAGNOSIS — E21.0 PRIMARY HYPERPARATHYROIDISM: ICD-10-CM

## 2017-09-08 DIAGNOSIS — I10 ESSENTIAL HYPERTENSION: ICD-10-CM

## 2017-09-08 LAB
25(OH)D3+25(OH)D2 SERPL-MCNC: 51 NG/ML
ALBUMIN SERPL BCP-MCNC: 3.7 G/DL
ALBUMIN SERPL BCP-MCNC: 3.7 G/DL
ALP SERPL-CCNC: 109 U/L
ALP SERPL-CCNC: 109 U/L
ALT SERPL W/O P-5'-P-CCNC: 27 U/L
ALT SERPL W/O P-5'-P-CCNC: 27 U/L
ANION GAP SERPL CALC-SCNC: 9 MMOL/L
AST SERPL-CCNC: 24 U/L
AST SERPL-CCNC: 24 U/L
BILIRUB DIRECT SERPL-MCNC: 0.2 MG/DL
BILIRUB SERPL-MCNC: 0.5 MG/DL
BILIRUB SERPL-MCNC: 0.5 MG/DL
BUN SERPL-MCNC: 12 MG/DL
CALCIUM SERPL-MCNC: 10.4 MG/DL
CHLORIDE SERPL-SCNC: 109 MMOL/L
CO2 SERPL-SCNC: 24 MMOL/L
CREAT SERPL-MCNC: 1 MG/DL
EST. GFR  (AFRICAN AMERICAN): >60 ML/MIN/1.73 M^2
EST. GFR  (NON AFRICAN AMERICAN): >60 ML/MIN/1.73 M^2
ESTIMATED AVG GLUCOSE: 117 MG/DL
GLUCOSE SERPL-MCNC: 105 MG/DL
HAV IGM SERPL QL IA: NEGATIVE
HBA1C MFR BLD HPLC: 5.7 %
HBV CORE IGM SERPL QL IA: NEGATIVE
HBV SURFACE AG SERPL QL IA: NEGATIVE
HCV AB SERPL QL IA: NEGATIVE
POTASSIUM SERPL-SCNC: 4.1 MMOL/L
PROT SERPL-MCNC: 7.1 G/DL
PROT SERPL-MCNC: 7.1 G/DL
SODIUM SERPL-SCNC: 142 MMOL/L

## 2017-09-08 PROCEDURE — 3080F DIAST BP >= 90 MM HG: CPT | Mod: S$GLB,,, | Performed by: FAMILY MEDICINE

## 2017-09-08 PROCEDURE — 83036 HEMOGLOBIN GLYCOSYLATED A1C: CPT

## 2017-09-08 PROCEDURE — 80076 HEPATIC FUNCTION PANEL: CPT

## 2017-09-08 PROCEDURE — 82306 VITAMIN D 25 HYDROXY: CPT

## 2017-09-08 PROCEDURE — 80053 COMPREHEN METABOLIC PANEL: CPT

## 2017-09-08 PROCEDURE — 86235 NUCLEAR ANTIGEN ANTIBODY: CPT

## 2017-09-08 PROCEDURE — 80074 ACUTE HEPATITIS PANEL: CPT

## 2017-09-08 PROCEDURE — 3008F BODY MASS INDEX DOCD: CPT | Mod: S$GLB,,, | Performed by: FAMILY MEDICINE

## 2017-09-08 PROCEDURE — 99499 UNLISTED E&M SERVICE: CPT | Mod: S$GLB,,, | Performed by: FAMILY MEDICINE

## 2017-09-08 PROCEDURE — 36415 COLL VENOUS BLD VENIPUNCTURE: CPT

## 2017-09-08 PROCEDURE — 99214 OFFICE O/P EST MOD 30 MIN: CPT | Mod: S$GLB,,, | Performed by: FAMILY MEDICINE

## 2017-09-08 PROCEDURE — 99999 PR PBB SHADOW E&M-EST. PATIENT-LVL III: CPT | Mod: PBBFAC,,, | Performed by: FAMILY MEDICINE

## 2017-09-08 PROCEDURE — 3075F SYST BP GE 130 - 139MM HG: CPT | Mod: S$GLB,,, | Performed by: FAMILY MEDICINE

## 2017-09-08 NOTE — PROGRESS NOTES
"  Patient ID: Caitlyn Perez is a 52 y.o. female.    Chief Complaint: Urinary Retention    She reports one week ago she noticed shorter bouts of urination. She is going about 5 times a day. She denies blood in urine or pain with urination. She denies low back pain or fevers. She decreased her water intake from 8 bottles of water to 2 bottles about one month ago. She denies loss of control of bladder. She drinks 8 oz water and 16 oz juice daily.       Review of Systems   Respiratory: Negative.    Cardiovascular: Negative.    Gastrointestinal: Negative.    Genitourinary: Positive for difficulty urinating. Negative for dysuria.     I personally reviewed Past Medical History, Past Surgical history,  Past Social History and Family History    Objective:   BP (!) 130/90   Pulse 70   Ht 5' 4" (1.626 m)   Wt 90.6 kg (199 lb 11.8 oz)   SpO2 99%   BMI 34.28 kg/m²     Physical Exam   Constitutional: She is oriented to person, place, and time. She appears well-developed and well-nourished. No distress.   HENT:   Head: Normocephalic.   Right Ear: External ear normal.   Left Ear: External ear normal.   Mouth/Throat: Oropharynx is clear and moist.   Eyes: Conjunctivae and EOM are normal. Pupils are equal, round, and reactive to light. Right eye exhibits no discharge. Left eye exhibits no discharge. No scleral icterus.   Neck: Normal range of motion. No tracheal deviation present. No thyromegaly present.   Cardiovascular: Normal rate, regular rhythm, normal heart sounds and intact distal pulses.  Exam reveals no gallop.    No murmur heard.  Pulmonary/Chest: Effort normal and breath sounds normal. No respiratory distress. She has no wheezes. She has no rales. She exhibits no tenderness.   Abdominal: Soft. Bowel sounds are normal. She exhibits no distension and no mass. There is no tenderness. There is no rebound, no guarding and no CVA tenderness.   Musculoskeletal: Normal range of motion.        Right hip: Normal.        Left " hip: Normal.        Lumbar back: Normal.   Neurological: She is alert and oriented to person, place, and time.   Skin: Skin is warm and dry.   Psychiatric: She has a normal mood and affect. Her behavior is normal. Judgment and thought content normal.   Vitals reviewed.      Caitlyn was seen today for urinary retention.    Diagnoses and all orders for this visit:    Urinary anomaly  -patient has been advised to drink only water, return next week for re-evaluation, urology referral has been placed, call with any worsening of symptoms   -     Comprehensive metabolic panel; Future    Prediabetes  -     Hemoglobin A1c; Future    Essential hypertension  -uncontrolled, patient took medicine in office, return in one week for bp recheck

## 2017-09-11 LAB — MITOCHONDRIA AB TITR SER IF: NORMAL {TITER}

## 2017-09-12 RX ORDER — VALSARTAN 160 MG/1
160 TABLET ORAL DAILY
Qty: 90 TABLET | Refills: 2 | Status: SHIPPED | OUTPATIENT
Start: 2017-09-12 | End: 2018-01-04 | Stop reason: SDUPTHER

## 2017-09-12 RX ORDER — AMLODIPINE BESYLATE 10 MG/1
10 TABLET ORAL DAILY
Qty: 30 TABLET | Refills: 0 | Status: SHIPPED | OUTPATIENT
Start: 2017-09-12 | End: 2017-12-30 | Stop reason: SDUPTHER

## 2017-09-12 RX ORDER — VALSARTAN 160 MG/1
160 TABLET ORAL DAILY
Qty: 90 TABLET | Refills: 2 | Status: SHIPPED | OUTPATIENT
Start: 2017-09-12 | End: 2017-09-12 | Stop reason: SDUPTHER

## 2017-09-12 NOTE — TELEPHONE ENCOUNTER
----- Message from Debi Coe sent at 9/12/2017  8:22 AM CDT -----  Contact: ANDRES SHORT [[id number]]  _x  1st Request  _  2nd Request  _  3rd Request        Who: ANDRES SHORT [[id number]]    Why: patient would like results from test and also a refill on blood pressure medications to preferred pharmacy. Please call     What Number to Call Back: [phone]    When to Expect a call back: (With in 24 hours)

## 2017-09-13 ENCOUNTER — TELEPHONE (OUTPATIENT)
Dept: INTERNAL MEDICINE | Facility: CLINIC | Age: 52
End: 2017-09-13

## 2017-09-13 NOTE — TELEPHONE ENCOUNTER
called pt and left vm stating that RX has been sent to pharmacy and that  would like for pt to come in and schedule an appt 2 weeks after starting amlodipine.  Left pt office number to call and make an appt on the nurse schedule.

## 2017-09-13 NOTE — TELEPHONE ENCOUNTER
----- Message from Mireya Mancilla sent at 9/13/2017  3:13 PM CDT -----  Contact: pt  _  1st Request  _  2nd Request  _  3rd Request        Who: pt    Why: pt returned the nurse's phone call. Please call pt     What Number to Call Back:[phone]    When to Expect a call back: (With in 24 hours)

## 2017-09-13 NOTE — TELEPHONE ENCOUNTER
Called and spoke w/ pt she states that she has already booked an appt to see pcp.  Pt has no further questions or concerns

## 2017-09-19 ENCOUNTER — NURSE TRIAGE (OUTPATIENT)
Dept: ADMINISTRATIVE | Facility: CLINIC | Age: 52
End: 2017-09-19

## 2017-09-19 NOTE — TELEPHONE ENCOUNTER
----- Message from Maria Isabel Campos sent at 9/19/2017  3:21 PM CDT -----  Contact: Self  X  1st Request  _  2nd Request  _  3rd Request        Who: ANDRES SHORT [[id number]]    Why: Pt is requesting a new prescription for sciatic pain medicine. Please call pt to further discuss,sharonks!    What Number to Call Back: [phone]    When to Expect a call back: (With in 24 hours)

## 2017-09-19 NOTE — TELEPHONE ENCOUNTER
Reason for Disposition   [1] SEVERE pain (e.g., excruciating, unable to do any normal activities) AND [2] not improved after 2 hours of pain medicine    Protocols used:  LEG PAIN-A-    Caitlyn is calling to report sciatic pain.  States pain is at a level 8.  States is taking 1,000 mg of Ibuprofen and not getting relief.  Advised to go to ER.

## 2017-09-19 NOTE — TELEPHONE ENCOUNTER
Pt called in requesting a new prescription for sciatic pain medicine. Pt did not state reason nor specific name of RX .  Please authorize and advise

## 2017-09-19 NOTE — TELEPHONE ENCOUNTER
Please contact pt and inquire what med(s) she is currently taking for sciatic pain?   If it is a prescription then who is prescribing med - please check med list as well  What is the specific problem with the current medication she is taking?    If her symptoms are worsening then rec she make an UC appt with a provider to evaluate this

## 2017-09-20 RX ORDER — NAPROXEN 375 MG/1
TABLET ORAL
Refills: 0 | Status: CANCELLED | OUTPATIENT
Start: 2017-09-20

## 2017-09-20 NOTE — TELEPHONE ENCOUNTER
----- Message from Maria Isabel Campos sent at 9/20/2017  9:31 AM CDT -----  Contact: Self  X  1st Request  _  2nd Request  _  3rd Request        Who:  ANDRES SHORT [[id number]]    Why: Pt is following up on a request for pain medicine and did not provide any details.Please call,thanks!    What Number to Call Back: [phone]    When to Expect a call back: (With in 24 hours)

## 2017-09-20 NOTE — TELEPHONE ENCOUNTER
Spoke / pt she states that's she was currently taking naproxen for sciatic pain prescribed by .   Pt didn't state that anything is wrong w/ the naproxen she states that she wants ibuprofen for inflammation.  I stated to pt that I will send an message to  stating that she is requesting ibuprofen but I stated that in the mean time I will send an refill request for the naproxen to get refilled and she can  an otc Ibuprofen if she really needs it until  responds.   Pt verbally undersatdnsa and states that she dont want the naproxen   RX pending   LCV 9/08/2017  Please authorize and advise

## 2017-09-20 NOTE — TELEPHONE ENCOUNTER
----- Message from Maria Isabel Campos sent at 9/20/2017  3:07 PM CDT -----  Contact: Self  _  1st Request  X   2nd Request  _  3rd Request        Who: ANDRES SHORT [[id number]]    Why: Pt states she would like to speak to the clinical team regarding pain medication. Pt disconnected call with no further details. Please call,thanks!    What Number to Call Back: [phone]    When to Expect a call back: (With in 24 hours)

## 2017-09-20 NOTE — TELEPHONE ENCOUNTER
----- Message from Swathi Fonseca sent at 9/19/2017  5:31 PM CDT -----  Contact: Pt   Pt is looking to get medication says she is in pain on her big toe onto her leg and now it is on the left side of her face.      Transferred pt over to on call nurse but still wants to know what to do    Pt can be reached at [phone]    Thanks

## 2017-09-21 NOTE — TELEPHONE ENCOUNTER
Discussed wit pt. New onset sciatica. Recommend she be seen. She reports improvement and declines apts. ER prompts given. Discussed nsaids and cautions.

## 2017-10-03 ENCOUNTER — PATIENT OUTREACH (OUTPATIENT)
Dept: INTERNAL MEDICINE | Facility: CLINIC | Age: 52
End: 2017-10-03

## 2018-01-02 ENCOUNTER — TELEPHONE (OUTPATIENT)
Dept: INTERNAL MEDICINE | Facility: CLINIC | Age: 53
End: 2018-01-02

## 2018-01-02 DIAGNOSIS — Z00.00 ANNUAL PHYSICAL EXAM: Primary | ICD-10-CM

## 2018-01-02 DIAGNOSIS — R73.01 IFG (IMPAIRED FASTING GLUCOSE): ICD-10-CM

## 2018-01-02 RX ORDER — AMLODIPINE BESYLATE 10 MG/1
TABLET ORAL
Qty: 30 TABLET | Refills: 1 | Status: SHIPPED | OUTPATIENT
Start: 2018-01-02 | End: 2018-02-28 | Stop reason: SDUPTHER

## 2018-01-02 NOTE — TELEPHONE ENCOUNTER
lvm to inform pt that her med for Norvasc was approved and that the Diovan she has refill just have to contact rx and I place order for annual labs and just waiting for dr to signed.

## 2018-01-02 NOTE — TELEPHONE ENCOUNTER
----- Message from Mireya Mancilla sent at 1/2/2018  1:11 PM CST -----  Contact: pt      _  1st Request  _  2nd Request  _  3rd Request    Please refill the medication(s) listed below. Please call the patient when the prescription(s) is ready for  at this phone number      [phone]    Needs mammogram scheduled also    Medication #1amLODIPine (NORVASC) 10 MG tablet     Medication #2valsartan (DIOVAN) 160 MG tablet       [facility]acy[facility]

## 2018-01-02 NOTE — TELEPHONE ENCOUNTER
----- Message from Mireya Mancilla sent at 1/2/2018  1:10 PM CST -----  Contact: pt  _  1st Request  _  2nd Request  _  3rd Request        Who: pt    Why: Requesting a call back in regards to needs orders for blood work. Please call pt     What Number to Call Back:[phone]    When to Expect a call back: (Within 24 hours)    Please return the call at earliest convenience. Thanks!

## 2018-01-03 NOTE — TELEPHONE ENCOUNTER
Spoke with pt and set up her appt for annual lab work 01/15/18 at 900 am. Pt verbalized understanding

## 2018-01-04 RX ORDER — VALSARTAN 160 MG/1
160 TABLET ORAL DAILY
Qty: 90 TABLET | Refills: 0 | Status: SHIPPED | OUTPATIENT
Start: 2018-01-04 | End: 2018-08-16

## 2018-01-04 NOTE — TELEPHONE ENCOUNTER
----- Message from Joana Walls sent at 1/4/2018 11:53 AM CST -----  Contact: Patient herself  _  1st Request  X 2nd Request  _  3rd Request    Please refill the medication(s) listed below. Please call the patient when the prescription(s) is ready for  at the phone number [phone]) .    Medication #1  VALSARTAN  160 mg    Preferred Pharmacy: [facility] # [id number] - [place] - [address]# [phone]  /  Fax# [phone]

## 2018-01-05 ENCOUNTER — TELEPHONE (OUTPATIENT)
Dept: INTERNAL MEDICINE | Facility: CLINIC | Age: 53
End: 2018-01-05

## 2018-01-05 DIAGNOSIS — Z12.31 ENCOUNTER FOR SCREENING MAMMOGRAM FOR MALIGNANT NEOPLASM OF BREAST: Primary | ICD-10-CM

## 2018-01-05 NOTE — TELEPHONE ENCOUNTER
----- Message from Tabby Santamaria sent at 1/5/2018  1:13 PM CST -----  _  1st Request  _  2nd Request  _  3rd Request        Who: patient     Why: Requesting a call back in regards to scheduling her annual mammogram.     What Number to Call Back:[phone]    When to Expect a call back: (Within 24 hours)    Please return the call at earliest convenience. Thanks!

## 2018-01-15 ENCOUNTER — PATIENT OUTREACH (OUTPATIENT)
Dept: INTERNAL MEDICINE | Facility: CLINIC | Age: 53
End: 2018-01-15

## 2018-01-15 NOTE — PROGRESS NOTES
[facility] is committed to your overall health.  To help you get the most out of each of your visits, we will review your information to make sure you are up to date on all of your recommended tests and/or procedures.       Your PCP  Jeanette Delacruz MD   found that you may be due for:       Health Maintenance Due   Topic Date Due    Influenza Vaccine  08/01/2017    Lipid Panel  12/13/2017             If you have had any of the above done at another facility, please bring the records or information with you so that your record at [facility] will be complete.  If you would like to schedule any of these, please contact me.     If you are currently taking medication, please bring it with you to your appointment for review.     Also, if you have any type of Advanced Directives, please bring them with you to your office visit so we may scan them into your chart.       Thank you for Choosing [facility] for your healthcare needs.        Additional Information  If you have questions, you can email [email or web address] or call [phone]  to talk to our [facility] staff. Remember, [facility] is NOT to be used for urgent needs. For medical emergencies, dial 911.

## 2018-01-17 ENCOUNTER — TELEPHONE (OUTPATIENT)
Dept: INTERNAL MEDICINE | Facility: CLINIC | Age: 53
End: 2018-01-17

## 2018-01-17 ENCOUNTER — PATIENT MESSAGE (OUTPATIENT)
Dept: INTERNAL MEDICINE | Facility: CLINIC | Age: 53
End: 2018-01-17

## 2018-01-17 ENCOUNTER — TELEPHONE (OUTPATIENT)
Dept: OBSTETRICS AND GYNECOLOGY | Facility: CLINIC | Age: 53
End: 2018-01-17

## 2018-01-17 ENCOUNTER — HOSPITAL ENCOUNTER (OUTPATIENT)
Dept: RADIOLOGY | Facility: OTHER | Age: 53
Discharge: HOME | End: 2018-01-17
Attending: INTERNAL MEDICINE
Payer: MEDICARE

## 2018-01-17 ENCOUNTER — TELEPHONE (OUTPATIENT)
Dept: SURGERY | Facility: CLINIC | Age: 53
End: 2018-01-17

## 2018-01-17 ENCOUNTER — OFFICE VISIT (OUTPATIENT)
Dept: OBSTETRICS AND GYNECOLOGY | Facility: CLINIC | Age: 53
End: 2018-01-17
Attending: OBSTETRICS & GYNECOLOGY
Payer: MEDICARE

## 2018-01-17 VITALS
BODY MASS INDEX: 32.27 KG/M2 | HEIGHT: 66 IN | SYSTOLIC BLOOD PRESSURE: 130 MMHG | WEIGHT: 200.81 LBS | DIASTOLIC BLOOD PRESSURE: 80 MMHG

## 2018-01-17 VITALS — WEIGHT: 199 LBS | HEIGHT: 64 IN | BODY MASS INDEX: 33.97 KG/M2

## 2018-01-17 DIAGNOSIS — Z80.3 FAMILY HISTORY OF MALIGNANT NEOPLASM OF BREAST: Primary | ICD-10-CM

## 2018-01-17 DIAGNOSIS — R79.89 ELEVATED LIVER FUNCTION TESTS: Primary | ICD-10-CM

## 2018-01-17 DIAGNOSIS — Z01.419 WELL FEMALE EXAM WITH ROUTINE GYNECOLOGICAL EXAM: Primary | ICD-10-CM

## 2018-01-17 DIAGNOSIS — Z12.31 ENCOUNTER FOR SCREENING MAMMOGRAM FOR MALIGNANT NEOPLASM OF BREAST: ICD-10-CM

## 2018-01-17 DIAGNOSIS — Z80.3 FAMILY HISTORY OF MALIGNANT NEOPLASM OF BREAST: ICD-10-CM

## 2018-01-17 DIAGNOSIS — Z91.89 AT HIGH RISK FOR BREAST CANCER: Primary | ICD-10-CM

## 2018-01-17 PROCEDURE — 88142 CYTOPATH C/V THIN LAYER: CPT | Performed by: PATHOLOGY

## 2018-01-17 PROCEDURE — 88141 CYTOPATH C/V INTERPRET: CPT | Mod: ,,, | Performed by: PATHOLOGY

## 2018-01-17 PROCEDURE — 99999 PR PBB SHADOW E&M-EST. PATIENT-LVL IV: CPT | Mod: PBBFAC,,, | Performed by: OBSTETRICS & GYNECOLOGY

## 2018-01-17 PROCEDURE — 77067 SCR MAMMO BI INCL CAD: CPT | Mod: 26,,, | Performed by: INTERNAL MEDICINE

## 2018-01-17 PROCEDURE — 77063 BREAST TOMOSYNTHESIS BI: CPT | Mod: 26,,, | Performed by: INTERNAL MEDICINE

## 2018-01-17 PROCEDURE — 77067 SCR MAMMO BI INCL CAD: CPT | Mod: TC

## 2018-01-17 PROCEDURE — G0101 CA SCREEN;PELVIC/BREAST EXAM: HCPCS | Mod: S$GLB,,, | Performed by: OBSTETRICS & GYNECOLOGY

## 2018-01-17 NOTE — TELEPHONE ENCOUNTER
----- Message from Myranda Roa MD sent at 1/17/2018 12:58 PM CST -----  Needs appt  ----- Message -----  From: Abby Mathis MD  Sent: 1/17/2018  11:36 AM  To: Myranda Roa MD    Needs appt to see you.  Having trouble finding EPIC order.

## 2018-01-17 NOTE — PROGRESS NOTES
SUBJECTIVE:   52 y.o. female   for routine gyn exam. No LMP recorded. Patient is postmenopausal..  She has no unusual complaints.  No PMB.  No HRT.           Past Medical History:   Diagnosis Date    Abnormal Pap smear of cervix     Aneurysm     CVA (cerebral infarction) 2012    Diabetes mellitus, type 2     Family history of breast cancer     Hypertension     Parathyroid disease     Stroke     Lt sided weakness     Past Surgical History:   Procedure Laterality Date     SECTION      x 4    COLONOSCOPY N/A 2016    Procedure: COLONOSCOPY;  Surgeon: Gio Bettencourt MD;  [facility]  Service: Endoscopy;  Laterality: N/A;    COLONOSCOPY N/A 2017    Procedure: COLONOSCOPY;  Surgeon: Gio Bettencourt MD;  [facility]  Service: Endoscopy;  Laterality: N/A;    TUBAL LIGATION       Social History     Social History    Marital status:      Spouse name: N/A    Number of children: 6    Years of education: 12     Occupational History    disabled      previously [email or web address] manager     Social History Main Topics    Smoking status: Never Smoker    Smokeless tobacco: Never Used    Alcohol use No    Drug use: No    Sexual activity: Not Currently     Partners: Male     Birth control/ protection: None     Other Topics Concern    Not on file     Social History Narrative    Walks for exercise    4 of her 6 children live with her.  15, 15, 17, yo and 27 live with her     Family History   Problem Relation Age of Onset    Diabetes type II Mother     Kidney disease Mother      on HD due to diabetes    Hypertension Mother     Diabetes Mother     Breast cancer Father 83    Breast cancer Paternal Uncle     Breast cancer Paternal Aunt 17    Lung cancer Maternal Aunt     Diabetes Maternal Aunt     Diabetes Brother     Diabetes Maternal Uncle     Diabetes Maternal Aunt     Diabetes Maternal Aunt     Diabetes Maternal Aunt      Diabetes Maternal Uncle     Diabetes Maternal Uncle     Diabetes Maternal Uncle     Breast cancer Paternal Grandmother      OB History    Para Term  AB Living   5 5 5     6   SAB TAB Ectopic Multiple Live Births         1 6      # Outcome Date GA Lbr Dougie/2nd Weight Sex Delivery Anes PTL Lv   5 Term            4 Term            3 Term            2 Term            1 Term                     Current Outpatient Prescriptions   Medication Sig Dispense Refill    amLODIPine (NORVASC) 10 MG tablet TAKE 1 TABLET(10 MG) BY MOUTH EVERY DAY 30 tablet 1    aspirin (ECOTRIN) 81 MG EC tablet Take 81 mg by mouth once daily.      bisacodyl (DULCOLAX) 5 mg EC tablet Take 5 mg by mouth daily as needed for Constipation.      CYANOCOBALAMIN, VITAMIN B-12, (VITAMIN B-12 ORAL) Take 1 tablet by mouth once daily.      KRILL OIL ORAL Take 1 capsule by mouth once daily.      MULTIVITAMIN W-MINERALS/LUTEIN (CENTRUM SILVER ORAL) Take 1 tablet by mouth once daily.      naproxen (NAPROSYN) 375 MG tablet TK ONE T PO TID WITH FOOD PRF PAIN  0    PRAVASTATIN SODIUM (PRAVASTATIN ORAL) Take by mouth.      tramadol (ULTRAM) 50 mg tablet TK 1  T   PO Q 6 HOURS PRN FOR PAIN  0    valsartan (DIOVAN) 160 MG tablet Take 1 tablet (160 mg total) by mouth once daily. 90 tablet 0    blood sugar diagnostic Strp True Test blood glucose test strips & lancets. Check blood sugars daily. 3 month supply. 100 each 3     No current facility-administered medications for this visit.      Allergies: Patient has no known allergies.     ROS:  Constitutional: no weight loss, weight gain, fever, fatigue  Eyes:  No vision changes, glasses/contacts  ENT/Mouth: No ulcers, sinus problems, ears ringing, headache  Cardiovascular: No inability to lie flat, chest pain, exercise intolerance, swelling, heart palpitations  Respiratory: No wheezing, coughing blood, shortness of breath, or cough  Gastrointestinal: No diarrhea, bloody stool, nausea/vomiting,  "constipation, gas, hemorrhoids  Genitourinary: No blood in urine, painful urination, urgency of urination, frequency of urination, incomplete emptying, incontinence, abnormal bleeding, painful periods, heavy periods, vaginal discharge, vaginal odor, painful intercourse, sexual problems, bleeding after intercourse.  Musculoskeletal: No muscle weakness  Skin/Breast: No painful breasts, nipple discharge, masses, rash, ulcers  Neurological: No passing out, seizures, numbness, headache  Endocrine: No diabetes, hypothyroid, hyperthyroid, hot flashes, hair loss, abnormal hair growth, ance  Psychiatric: No depression, crying  Hematologic: No bruises, bleeding, swollen lymph nodes, anemia.      OBJECTIVE:   The patient appears well, alert, oriented x 3, in no distress.  /80 (BP Location: Right arm, Patient Position: Sitting, BP Method: Large (Manual))   Ht 5' 6" (1.676 m)   Wt 91.1 kg (200 lb 13.4 oz)   BMI 32.42 kg/m²   NECK: no thyromegaly, trachea midline  SKIN: no acne, striae, hirsutism  CHEST: CTAB  CV: RRR  BREAST EXAM: breasts appear normal, no suspicious masses, no skin or nipple changes or axillary nodes  ABDOMEN: no hernias, masses, or hepatosplenomegaly  GENITALIA: normal external genitalia, no erythema, no discharge  URETHRA: normal urethra, normal urethral meatus  VAGINA: Normal  CERVIX: no lesions or cervical motion tenderness  UTERUS: normal size, contour, position, consistency, mobility, non-tender  ADNEXA: no mass, fullness, tenderness      ASSESSMENT:   1. Well female exam with routine gynecological exam  Liquid-based pap smear, screening   2. Family history of malignant neoplasm of breast  Ambulatory referral to [facility]       PLAN:   Orders Placed This Encounter    Ambulatory referral to [facility]    Liquid-based pap smear, screening     Discussed FH breast CA.  Recommend consult with breast surgeons due to high risk on TZ scoring system.  " Consider annual breast MRI.  Consult Dr. Roa for genetic testing options.  Return to clinic in 1 year

## 2018-01-17 NOTE — TELEPHONE ENCOUNTER
Called patient, scheduled with Dr. Mcwilliams for 2/8/18 per her request. Reviewed location of [facility], patient verbalized understanding to all information

## 2018-01-17 NOTE — TELEPHONE ENCOUNTER
----- Message from Veronica Gao MA sent at 1/17/2018 11:57 AM CST -----  Contact: Veronica/Abby Mathis is referring patient to consult family history of Breast Cancer- Male and Female Family Hx. Please call patient to schedule appointment      Veronica

## 2018-01-17 NOTE — TELEPHONE ENCOUNTER
Please let pt know her liver functions were mildly elevated and that I would like to repeat this just prior to her upcoming apt. Please schedule labs in about a week.

## 2018-01-18 NOTE — TELEPHONE ENCOUNTER
called pt and left vm stating that PCP rec that she f/u w/ breast clinic .   Left office number for pt to call and schedule w/ breast clinic

## 2018-01-19 ENCOUNTER — TELEPHONE (OUTPATIENT)
Dept: INTERNAL MEDICINE | Facility: CLINIC | Age: 53
End: 2018-01-19

## 2018-01-19 NOTE — TELEPHONE ENCOUNTER
----- Message from Mireya Mancilla sent at 1/19/2018  2:33 PM CST -----  Contact: pt  _  1st Request  _  2nd Request  _  3rd Request        Who: pt    Why: Requesting a call back in regards to needs someone to go over her test results. Please call pt     What Number to Call Back:[phone]    When to Expect a call back: (Within 24 hours)    Please return the call at earliest convenience. Thanks!

## 2018-01-19 NOTE — TELEPHONE ENCOUNTER
Spoke with pt about her lab results. Informed her that most of her liver panel was normal that triglycerides was slightly elevated and she states that she is trying to control that with her diet. Eating fruits and vegetables. No more questions or concerns

## 2018-01-24 ENCOUNTER — TELEPHONE (OUTPATIENT)
Dept: OBSTETRICS AND GYNECOLOGY | Facility: CLINIC | Age: 53
End: 2018-01-24

## 2018-01-25 ENCOUNTER — OFFICE VISIT (OUTPATIENT)
Dept: OBSTETRICS AND GYNECOLOGY | Facility: CLINIC | Age: 53
End: 2018-01-25
Attending: OBSTETRICS & GYNECOLOGY
Payer: MEDICARE

## 2018-01-25 ENCOUNTER — LAB VISIT (OUTPATIENT)
Dept: LAB | Facility: OTHER | Age: 53
End: 2018-01-25
Attending: OBSTETRICS & GYNECOLOGY
Payer: MEDICARE

## 2018-01-25 VITALS
SYSTOLIC BLOOD PRESSURE: 128 MMHG | DIASTOLIC BLOOD PRESSURE: 76 MMHG | WEIGHT: 204.56 LBS | BODY MASS INDEX: 33.02 KG/M2

## 2018-01-25 DIAGNOSIS — Z80.3 FAMILY HISTORY OF BREAST CANCER: ICD-10-CM

## 2018-01-25 DIAGNOSIS — Z13.79 GENETIC TESTING: ICD-10-CM

## 2018-01-25 DIAGNOSIS — Z80.0 FAMILY HISTORY OF COLON CANCER: ICD-10-CM

## 2018-01-25 DIAGNOSIS — Z80.3 FAMILY HISTORY OF BREAST CANCER IN MALE: ICD-10-CM

## 2018-01-25 DIAGNOSIS — Z80.3 FAMILY HISTORY OF BREAST CANCER: Primary | ICD-10-CM

## 2018-01-25 DIAGNOSIS — Z71.83 ENCOUNTER FOR NONPROCREATIVE GENETIC COUNSELING: ICD-10-CM

## 2018-01-25 PROCEDURE — 99214 OFFICE O/P EST MOD 30 MIN: CPT | Mod: S$GLB,,, | Performed by: OBSTETRICS & GYNECOLOGY

## 2018-01-25 PROCEDURE — 36415 COLL VENOUS BLD VENIPUNCTURE: CPT

## 2018-01-26 ENCOUNTER — PATIENT OUTREACH (OUTPATIENT)
Dept: INTERNAL MEDICINE | Facility: CLINIC | Age: 53
End: 2018-01-26

## 2018-01-26 NOTE — PROGRESS NOTES
[facility] is committed to your overall health.  To help you get the most out of each of your visits, we will review your information to make sure you are up to date on all of your recommended tests and/or procedures.       Your PCP  Jeanette Delacruz MD   found that you may be due for:       Health Maintenance Due   Topic Date Due    Influenza Vaccine  08/01/2017             If you have had any of the above done at another facility, please bring the records or information with you so that your record at [facility] will be complete.  If you would like to schedule any of these, please contact me.     If you are currently taking medication, please bring it with you to your appointment for review.     Also, if you have any type of Advanced Directives, please bring them with you to your office visit so we may scan them into your chart.       Thank you for Choosing [facility] for your healthcare needs.        Additional Information  If you have questions, you can email [email or web address] or call [phone]  to talk to our [facility] staff. Remember, [facility] is NOT to be used for urgent needs. For medical emergencies, dial 911.

## 2018-01-28 NOTE — PROGRESS NOTES
"  Chief Complaint:  Genetic testing      History of Present Illness  Caitlyn Perez is a 52 y.o.  Female seen today in clinic.   She has a family history of cancer including 2 males with breast cancer. Pedigree scanned into chart.     Additional Information:      Height:  5'6"  Weight: 204 pounds  HRT use: no  Date of Last Mammogram: 2018 Tyrer Cuzick >20%      Past Medical History:   Diagnosis Date    Abnormal Pap smear of cervix     Aneurysm     CVA (cerebral infarction) 2012    Diabetes mellitus, type 2     Family history of breast cancer     Hypertension     Parathyroid disease     Stroke     Lt sided weakness     Past Surgical History:   Procedure Laterality Date     SECTION      x 4    COLONOSCOPY N/A 2016    Procedure: COLONOSCOPY;  Surgeon: Gio Bettencourt MD;  Location: [facility] ENDO ([address] [place] [address]);  Service: Endoscopy;  Laterality: N/A;    COLONOSCOPY N/A 2017    Procedure: COLONOSCOPY;  Surgeon: Gio Bettencourt MD;  Location: [facility] ENDO ([address] [place] [address]);  Service: Endoscopy;  Laterality: N/A;    TUBAL LIGATION       Social History     Social History    Marital status:      Spouse name: N/A    Number of children: 6    Years of education: 12     Occupational History    disabled      previously [employer] manager     Social History Main Topics    Smoking status: Never Smoker    Smokeless tobacco: Never Used    Alcohol use Yes    Drug use: No    Sexual activity: Not Currently     Partners: Male     Birth control/ protection: None     Other Topics Concern    Not on file     Social History Narrative    Walks for exercise    4 of her 6 children live with her.  15, 15, 17, yo and 27 live with her     Family History   Problem Relation Age of Onset    Diabetes type II Mother     Kidney disease Mother      on HD due to diabetes    Hypertension Mother     Diabetes Mother     Breast cancer Father 83    Breast cancer Paternal Uncle     Breast cancer Paternal " Aunt 17    Lung cancer Maternal Aunt     Diabetes Maternal Aunt     Diabetes Brother     Diabetes Maternal Uncle     Diabetes Maternal Aunt     Diabetes Maternal Aunt     Diabetes Maternal Aunt     Diabetes Maternal Uncle     Diabetes Maternal Uncle     Diabetes Maternal Uncle     Breast cancer Paternal Grandmother      OB History    Para Term  AB Living   5 5 5     6   SAB TAB Ectopic Multiple Live Births         1 6      # Outcome Date GA Lbr Dougie/2nd Weight Sex Delivery Anes PTL Lv   5 Term            4 Term            3 Term            2 Term            1 Term                     Current Outpatient Prescriptions   Medication Sig Dispense Refill    amLODIPine (NORVASC) 10 MG tablet TAKE 1 TABLET(10 MG) BY MOUTH EVERY DAY 30 tablet 1    aspirin (ECOTRIN) 81 MG EC tablet Take 81 mg by mouth once daily.      CYANOCOBALAMIN, VITAMIN B-12, (VITAMIN B-12 ORAL) Take 1 tablet by mouth once daily.      KRILL OIL ORAL Take 1 capsule by mouth once daily.      MULTIVITAMIN W-MINERALS/LUTEIN (CENTRUM SILVER ORAL) Take 1 tablet by mouth once daily.      naproxen (NAPROSYN) 375 MG tablet TK ONE T PO TID WITH FOOD PRF PAIN  0    valsartan (DIOVAN) 160 MG tablet Take 1 tablet (160 mg total) by mouth once daily. 90 tablet 0    bisacodyl (DULCOLAX) 5 mg EC tablet Take 5 mg by mouth daily as needed for Constipation.      blood sugar diagnostic Strp True Test blood glucose test strips & lancets. Check blood sugars daily. 3 month supply. 100 each 3    PRAVASTATIN SODIUM (PRAVASTATIN ORAL) Take by mouth.      tramadol (ULTRAM) 50 mg tablet TK 1  T   PO Q 6 HOURS PRN FOR PAIN  0     No current facility-administered medications for this visit.      Allergies: Patient has no known allergies.         Review of Systems  Review of Systems     ROS:  Constitutional: no weight loss, weight gain, fever, fatigue  Gastrointestinal: No diarrhea, bloody stool, nausea/vomiting, constipation, gas,  hemorrhoids  Genitourinary: No blood in urine, painful urination, urgency of urination, frequency of urination, incomplete emptying, incontinence, abnormal bleeding, painful periods, heavy periods, vaginal discharge, vaginal odor, painful intercourse, sexual problems, bleeding after intercourse.  Musculoskeletal: No muscle weakness  Skin/Breast: No painful breasts, nipple discharge, masses, rash, ulcers  Neurological: No passing out, seizures, numbness, headache  Endocrine: No diabetes, hypothyroid, hyperthyroid, hot flashes, hair loss, abnormal hair growth, acne  Psychiatric: No depression, crying  Hematologic: No bruises, bleeding, swollen lymph nodes, anemia.       Objective:    Physical Exam     deferred  Assessment & Plan:       This patients  family history of cancer is suggestive of a hereditary cancer syndrome. She meets the criteria for genetic testing established by medical society guidelines and I recommended she pursue testing based on   these criteria.     Face to face time spent with patient 30 minutes  More than 90% of time spent in counseling and coordinating follow up for patient.    Today we discussed how genetics may affect cancer susceptibility and the options, alternatives, benefits, limitations and   potential outcomes of genetic testing for hereditary cancer syndromes. The genetic test recommended for this patient today is the [facility] ®     Hereditary Cancer Panel. [facility] analyzes 28 genes including BRCA1, BRCA2,   MLH1, MSH2, MSH6, and PMS2 to identify germline genetic mutations that cause an increased risk for eight primary   cancers including: breast, ovarian, endometrial, colorectal, gastric, pancreatic, prostate, and melanomas. Due to significant clinical overlap in hereditary cancer susceptibility genes, a molecular diagnosis of a hereditary cancer condition is necessary to clarify the cancer risks this patient carries and the screening, management, and treatment options  most   appropriate for her.   In our pre-test counseling, today, we discussed the possible results of genetic testing: a positive result would mean that a mutation known to be associated with a higher risk of cancer was identified and a negative result would mean that no mutation known to increase the risk of cancer was identified. We also discussed that a variant of uncertain significance   (VUS) may be reported. A VUS is a genetic change identified in a gene, but it is unclear if this change causes an increase   in cancer risk normally associated with mutations in the gene. Due to the clinical uncertainty of this type of change, VUSs   are not used to make medical management decisions for a patient. Finally, I advised the patient that her medical management may change based on these results and may include increased surveillance, use of chemoprevention,   prophylactic surgery, or modifications to family planning and lifestyle.   After our discussion, the patient elected to proceed with the [facility]   ® Hereditary Cancer Panel test today and she was given information on this test to keep for her records. The patient provided informed consent. She understands the potential results of this testing and their implications for medical management and relatives. A blood sample was collected, and sent to [facility].      A total of 30 minutes were spent discussing genetic testing and hereditary cancer syndromes with the patient today.        Plan:      Follow up for results as scheduled

## 2018-01-29 ENCOUNTER — TELEPHONE (OUTPATIENT)
Dept: INTERNAL MEDICINE | Facility: CLINIC | Age: 53
End: 2018-01-29

## 2018-01-29 ENCOUNTER — HOSPITAL ENCOUNTER (OUTPATIENT)
Dept: RADIOLOGY | Facility: OTHER | Age: 53
Discharge: HOME | End: 2018-01-29
Attending: INTERNAL MEDICINE
Payer: MEDICARE

## 2018-01-29 ENCOUNTER — OFFICE VISIT (OUTPATIENT)
Dept: INTERNAL MEDICINE | Facility: CLINIC | Age: 53
End: 2018-01-29
Attending: INTERNAL MEDICINE
Payer: MEDICARE

## 2018-01-29 VITALS
DIASTOLIC BLOOD PRESSURE: 68 MMHG | OXYGEN SATURATION: 98 % | HEIGHT: 66 IN | WEIGHT: 200.63 LBS | SYSTOLIC BLOOD PRESSURE: 122 MMHG | BODY MASS INDEX: 32.24 KG/M2 | HEART RATE: 76 BPM

## 2018-01-29 DIAGNOSIS — G81.10 SPASTIC HEMIPARESIS: ICD-10-CM

## 2018-01-29 DIAGNOSIS — E21.3 HYPERPARATHYROIDISM: ICD-10-CM

## 2018-01-29 DIAGNOSIS — M85.80 OSTEOPENIA, UNSPECIFIED LOCATION: ICD-10-CM

## 2018-01-29 DIAGNOSIS — I10 ESSENTIAL HYPERTENSION: Primary | ICD-10-CM

## 2018-01-29 DIAGNOSIS — I69.30 HISTORY OF CVA WITH RESIDUAL DEFICIT: ICD-10-CM

## 2018-01-29 DIAGNOSIS — R16.0 HEPATOMEGALY: ICD-10-CM

## 2018-01-29 DIAGNOSIS — R79.89 ELEVATED LFTS: ICD-10-CM

## 2018-01-29 DIAGNOSIS — E66.9 CLASS 1 OBESITY WITH BODY MASS INDEX (BMI) OF 32.0 TO 32.9 IN ADULT, UNSPECIFIED OBESITY TYPE, UNSPECIFIED WHETHER SERIOUS COMORBIDITY PRESENT: ICD-10-CM

## 2018-01-29 PROCEDURE — G0008 ADMIN INFLUENZA VIRUS VAC: HCPCS | Mod: S$GLB,,, | Performed by: INTERNAL MEDICINE

## 2018-01-29 PROCEDURE — 77080 DXA BONE DENSITY AXIAL: CPT | Mod: 26,,, | Performed by: RADIOLOGY

## 2018-01-29 PROCEDURE — 99396 PREV VISIT EST AGE 40-64: CPT | Mod: S$GLB,,, | Performed by: INTERNAL MEDICINE

## 2018-01-29 PROCEDURE — 99499 UNLISTED E&M SERVICE: CPT | Mod: S$GLB,,, | Performed by: INTERNAL MEDICINE

## 2018-01-29 PROCEDURE — 99999 PR PBB SHADOW E&M-EST. PATIENT-LVL IV: CPT | Mod: PBBFAC,,, | Performed by: INTERNAL MEDICINE

## 2018-01-29 PROCEDURE — 90686 IIV4 VACC NO PRSV 0.5 ML IM: CPT | Mod: S$GLB,,, | Performed by: INTERNAL MEDICINE

## 2018-01-29 PROCEDURE — 77080 DXA BONE DENSITY AXIAL: CPT | Mod: TC

## 2018-01-29 NOTE — PROGRESS NOTES
Pt informed her bone density is normal. - recommended 1500mg of calcium and 1000IU vit d in divided dose daily

## 2018-01-29 NOTE — PROGRESS NOTES
"Subjective:       Patient ID: Caitlyn Perez is a 52 y.o. female.    Chief Complaint: Annual Exam     Caitlyn Perez is a 52 y.o.  female who presents for Annual Exam  .  Patient Active Problem List   Diagnosis    Essential hypertension    Mixed hyperlipidemia    Hyperparathyroidism    Obesity due to excess calories    Prediabetes    Peripheral neuropathy    History of abnormal Pap smear    Spastic hemiparesis    Central pain syndrome    Abnormality of gait    Obesity, Class II, BMI 35-39.9, with comorbidity    Screen for colon cancer    History of CVA with residual deficit    Elevated LFTs    Family history of malignant neoplasm of breast     Reports she has been skipping her BP feeling that she was tired during the day. When she did this states her bracelet turns colors to tell her her pressure was too high.  Wants to "go natural" and be off medications. Today she took amlodipine 10 mg and valsartan 160 mg.      Started drinking a glass of wine daily, TGL are elevated. Cholesterol is controlled otherwise. No longer taking pravastatin. Taking "memory supplement" daily.  Hx of hyperparathyroidism, osteopenia. Did not follow up. No constipation.    hba1c has returned to normal range. No longer prediabetic!        Health Maintenance       Date Due Completion Date    Influenza Vaccine 08/01/2017 12/27/2016    Override on 1/15/2015: Done    Pap Smear with HPV Cotest 01/31/2018 (Originally 9/24/2017) 9/24/2014    Mammogram 01/17/2019 1/17/2018    Lipid Panel 01/17/2019 1/17/2018    Colonoscopy 01/25/2020 1/25/2017    Override on 9/16/2014: Done    TETANUS VACCINE 12/27/2026 12/27/2016          Review of Systems   Constitutional: Positive for fatigue (mid afternoon). Negative for chills and fever.   HENT: Negative for rhinorrhea and sore throat.    Respiratory: Negative for cough and shortness of breath.    Cardiovascular: Negative for chest pain and palpitations.   Gastrointestinal: Negative for nausea and " "vomiting.   Genitourinary: Negative for dysuria and hematuria.   Musculoskeletal: Negative for arthralgias and back pain.        LLE stiffness   Skin: Negative for color change and rash.   Neurological: Negative for weakness and numbness.   Psychiatric/Behavioral: Negative for agitation and dysphoric mood.         Past Medical History:   Diagnosis Date    Abnormal Pap smear of cervix     Aneurysm     CVA (cerebral infarction) 2012    Diabetes mellitus, type 2     Family history of breast cancer     Hypertension     Parathyroid disease     Stroke     Lt sided weakness       Past Surgical History:   Procedure Laterality Date     SECTION      x 4    COLONOSCOPY N/A 2016    Procedure: COLONOSCOPY;  Surgeon: Gio Bettencourt MD;  [facility]  Service: Endoscopy;  Laterality: N/A;    COLONOSCOPY N/A 2017    Procedure: COLONOSCOPY;  Surgeon: Gio Bettencourt MD;  [facility]  Service: Endoscopy;  Laterality: N/A;    TUBAL LIGATION         Family History   Problem Relation Age of Onset    Diabetes type II Mother     Kidney disease Mother      on HD due to diabetes    Hypertension Mother     Diabetes Mother     Breast cancer Father 83    Breast cancer Paternal Uncle     Breast cancer Paternal Aunt 17    Lung cancer Maternal Aunt     Diabetes Maternal Aunt     Diabetes Brother     Diabetes Maternal Uncle     Diabetes Maternal Aunt     Diabetes Maternal Aunt     Diabetes Maternal Aunt     Diabetes Maternal Uncle     Diabetes Maternal Uncle     Diabetes Maternal Uncle     Breast cancer Paternal Grandmother        Social History   Substance Use Topics    Smoking status: Never Smoker    Smokeless tobacco: Never Used    Alcohol use Yes             Objective:   Blood pressure 122/68, pulse 76, height 5' 6" (1.676 m), weight 91 kg (200 lb 9.9 oz), SpO2 98 %.     Physical Exam   Constitutional: She is oriented to person, place, and time. " She appears well-developed and well-nourished. No distress.   HENT:   Head: Normocephalic and atraumatic.   Right Ear: External ear normal.   Left Ear: External ear normal.   Eyes: Conjunctivae are normal. No scleral icterus.   Neck: No JVD present. Carotid bruit is not present. No thyromegaly present.   Cardiovascular: Normal rate and normal heart sounds.  Exam reveals no gallop and no friction rub.    No murmur heard.  Pulmonary/Chest: Effort normal and breath sounds normal. She has no wheezes. She has no rales.   Abdominal: Soft. Bowel sounds are normal. She exhibits no distension. There is no tenderness.   Musculoskeletal: She exhibits no edema or tenderness.   Lymphadenopathy:     She has no cervical adenopathy.        Right: No supraclavicular adenopathy present.        Left: No supraclavicular adenopathy present.   Neurological: She is alert and oriented to person, place, and time. She has normal reflexes.   LUE weakness   Skin: Skin is warm and dry.   Psychiatric: She has a normal mood and affect. Her behavior is normal. Thought content normal.       Prior labs reviewed  Assessment/Plan:        Caitlyn was seen today for annual exam.    Diagnoses and all orders for this visit:    Essential hypertension  Take meds daily  Check BP during fatigue, if low contact clinic   Plan to decrease amlodipine if needed  Low salt  Refer to digital htn    Spastic hemiparesis  Stable, s/p hemorragic CVA    Elevated LFTs  -     Ambulatory Referral to Hepatology  Stop daily etOH, cont wt loss    History of CVA with residual deficit  Stable, no current issues    Class 1 obesity with body mass index (BMI) of 32.0 to 32.9 in adult, unspecified obesity type, unspecified whether serious comorbidity present    - cont diet and add exercise  - increase intensity and duration of CV exercise to continue weight loss  - goal wt loss one pound per week  - portion control, healthy choices        Hepatomegaly  -     Ambulatory Referral to  Hepatology    Hyperparathyroidism  -     Ambulatory consult to Endocrinology  -     PTH, intact; Future  -     Vitamin D; Future  Lost to follow up, refer back to endo    Osteopenia, unspecified location  -     DXA Bone Density Spine And Hip; Future    Other orders  -     Hypertension Digital Medicine (HDMP) Enrollment Order  -     Hypertension Digital Medicine ([facility]): Assign Onboarding Questionnaires  -     Influenza - Quadrivalent (3 years & older) (PF)

## 2018-01-29 NOTE — TELEPHONE ENCOUNTER
----- Message from Tamiko Carrillo sent at 1/29/2018 12:05 PM CST -----  Contact: pt  x_  1st Request  _  2nd Request  _  3rd Request    Who: ANDRES SHORT [[id number]]    Why: Patient would like to speak with staff in reference to getting a refill on her blood pressure medication. Pt does not know the name of the medication.    What Number to Call Back:[phone]    When to Expect a call back: (Within 24 hours)    Please return the call at earliest convenience. Thanks!

## 2018-01-29 NOTE — PROGRESS NOTES
"Patient was given vaccine information sheet for the Flu Vaccine. The area of injection was palpated using the acromion process as a landmark. This area was cleaned with alcohol. Using a 25g 1" safety needle, 0.5mL of the vaccine was placed into the left deltoid muscle. The injection site was dressed with a bandage. Patient experienced no complications and was discharged in stable condition. Fluzone vaccine Lot: [id number] Exp: 6/30/2018 NDC [id number].  Georgiana Cast RN  "

## 2018-01-30 ENCOUNTER — DOCUMENTATION ONLY (OUTPATIENT)
Dept: TRANSPLANT | Facility: CLINIC | Age: 53
End: 2018-01-30

## 2018-01-30 NOTE — NURSING
Pt records reviewed.   Pt will be referred to Hepatology.    Initial referral received  from the [facility]que.   Referring Provider/diagnosis  DEVON ALEMAN Provider:   Diagnosis: Elevated LFTs  Hepatomegaly           Referral letter sent to provider and patient.

## 2018-01-30 NOTE — LETTER
January 30, 2018    Caitlyn Perez  [address] [facility] [address]      Dear Caitlyn Perez:    Your doctor has referred you to the [facility]. You will be contacted by our office and an initial appointment will then be scheduled for you.    We look forward to seeing you soon. If you have any further questions, please contact us at [phone].       Sincerely,        [facility]   [facility] [place], [place] [id number] [phone]

## 2018-01-31 ENCOUNTER — OFFICE VISIT (OUTPATIENT)
Dept: ENDOCRINOLOGY | Facility: CLINIC | Age: 53
End: 2018-01-31
Payer: MEDICARE

## 2018-01-31 VITALS
DIASTOLIC BLOOD PRESSURE: 88 MMHG | BODY MASS INDEX: 32.53 KG/M2 | SYSTOLIC BLOOD PRESSURE: 127 MMHG | HEART RATE: 83 BPM | WEIGHT: 202.38 LBS | HEIGHT: 66 IN

## 2018-01-31 DIAGNOSIS — R26.9 ABNORMALITY OF GAIT: ICD-10-CM

## 2018-01-31 DIAGNOSIS — R73.02 IGT (IMPAIRED GLUCOSE TOLERANCE): ICD-10-CM

## 2018-01-31 DIAGNOSIS — Z80.3 FAMILY HISTORY OF MALIGNANT NEOPLASM OF BREAST: ICD-10-CM

## 2018-01-31 DIAGNOSIS — M85.80 OSTEOPENIA, UNSPECIFIED LOCATION: ICD-10-CM

## 2018-01-31 DIAGNOSIS — E55.9 VITAMIN D DEFICIENCY: ICD-10-CM

## 2018-01-31 DIAGNOSIS — E21.3 HYPERPARATHYROIDISM: Primary | ICD-10-CM

## 2018-01-31 PROCEDURE — 3008F BODY MASS INDEX DOCD: CPT | Mod: GC,S$GLB,, | Performed by: INTERNAL MEDICINE

## 2018-01-31 PROCEDURE — 99214 OFFICE O/P EST MOD 30 MIN: CPT | Mod: GC,S$GLB,, | Performed by: INTERNAL MEDICINE

## 2018-01-31 PROCEDURE — 99999 PR PBB SHADOW E&M-EST. PATIENT-LVL IV: CPT | Mod: PBBFAC,GC,, | Performed by: INTERNAL MEDICINE

## 2018-01-31 RX ORDER — ACETAMINOPHEN 500 MG
1 TABLET ORAL DAILY
Qty: 90 CAPSULE | Refills: 2 | COMMUNITY
Start: 2018-01-31 | End: 2018-06-04 | Stop reason: ALTCHOICE

## 2018-01-31 RX ORDER — ERGOCALCIFEROL 1.25 MG/1
50000 CAPSULE ORAL
Qty: 4 CAPSULE | Refills: 0 | Status: SHIPPED | OUTPATIENT
Start: 2018-01-31 | End: 2018-02-22

## 2018-01-31 NOTE — ASSESSMENT & PLAN NOTE
Careful to supplement Vitamin D because we don't want to worsen the hypercalcemia.  To take ergo 50K weekly for 4 weeks.  Then to start vitamin D3 2000iu daily afterward.    Will recheck labs upon completion of ergo.

## 2018-01-31 NOTE — PROGRESS NOTES
Subjective:       Patient ID: Caitlyn Perez is a 52 y.o. female.    Chief Complaint: Hyperparathyroidism    HPI     With regards to the hypercalcemia or primary hyperparathyroidism:  Patient is a follow up for hyperparathyroidism.  Previously seen by Dr. Vieira.  Work up at that time negative.    Diagnosed in 2010 in [place].  CVA in 2016 with residual L sided weakness.     Fracture history as a child.  No nephrolithiasis.  No HCTZ or lithium.    Daily intake of calcium is through diet and MVN  Taking vitamin D: none    Denies constipation, depression, polyuria   Drinks 1.5 gallons per day    Last bmd was: 1/29/18  Denies fractures, height loss, kidney stone.    Does have history cholelithiasis.    Denies HCTZ use.    Complains of yellow urine for the last year.  Unsure why.  Use dush with bleach - summer 2017.   Since then the smell has discontinued, but it is still yellow.    Review of Systems   Constitutional: Negative for unexpected weight change.   Eyes: Negative for visual disturbance.   Respiratory: Negative for shortness of breath.    Cardiovascular: Negative for chest pain.   Gastrointestinal: Negative for abdominal pain.   Endocrine: Negative for polydipsia.   Genitourinary: Negative for urgency.   Musculoskeletal: Negative for arthralgias.   Skin: Negative for wound.   Neurological: Negative for headaches.   Hematological: Does not bruise/bleed easily.   Psychiatric/Behavioral: Negative for sleep disturbance.       Objective:       Body mass index is 32.67 kg/m².    Physical Exam   Neck: No thyromegaly present.   Cardiovascular: Normal rate.    Pulmonary/Chest: Effort normal.   Abdominal: Soft.   Musculoskeletal: She exhibits no edema.   Vitals reviewed.          Chemistry        Component Value Date/Time     01/17/2018 1014    K 4.0 01/17/2018 1014     01/17/2018 1014    CO2 27 01/17/2018 1014    BUN 15 01/17/2018 1014    CREATININE 1.0 01/17/2018 1014     01/17/2018 1014        Component  Value Date/Time    CALCIUM 10.3 01/17/2018 1014    ALKPHOS 161 (H) 01/29/2018 1122    AST 36 01/29/2018 1122    ALT 59 (H) 01/29/2018 1122    BILITOT 0.6 01/29/2018 1122    ESTGFRAFRICA >60 01/17/2018 1014    EGFRNONAA >60 01/17/2018 1014        Lab Results   Component Value Date    .3 (H) 01/29/2018    CALCIUM 10.3 01/17/2018    PHOS 3.3 01/06/2016   Vitamin D - 14 (1/2018)  Albumin 4.2  a1c - 5.6%  TSH 1.242      DXA 1/29/18  The bone mineral density measured from L1 through L4 is 1.425g/cm2.  This corresponds to a T score of 1.9 and a Z score of 1.0.    The bone mineral density within the left femoral neck measures 0.805 g/cm2.  This corresponds to a T score of -1.7 and a Z score of -2.2.    The bone mineral density within the right femoral neck measures 0.846 g/cm2.  This corresponds to a T score of -1.4 and a Z score of -1.9.    FRAX RESULTS:  10-year Probability of Fracture:  Major Osteoporotic Fracture 2.7%.  Hip Fracture 0.2%.    1.  Normal evaluation of lumbar spine  2.  Osteopenia of the hips.    Based on these results a followup exam is recommended in January 2020.    Assessment:       1. Hyperparathyroidism    2. BMI 32.0-32.9,adult    3. Family history of malignant neoplasm of breast    4. IGT (impaired glucose tolerance)    5. Osteopenia, unspecified location    6. Vitamin D deficiency    7. Abnormality of gait          Plan:       Hyperparathyroidism  Not a surgical candidate at this time (>11, renal issue, osteoporosis, age <50y)    Elevated PTH in the setting of hypercalcemia and vitamin D deficiency  Previous work up unremarkable. Urine calcium wnl.     Imaging not indicated at this time.  After labwork will re-evaluate the need for imaging prior to surgical resection if lab work indicates, as parathyroid adenoma is the leading cause of primary hyperparathyroidism.    Reviewed DEXA Scan.  See osteopenia for further management.    Will need to replete Vitamin D and repeat.    BMI  32.0-32.9,adult  Discussed weight loss, exercise, and healthy eating.  Decreases risk of DM    Goal of 7% weight loss.    IGT (impaired glucose tolerance)  History of a1c 6.0%.  Most recent 5.5%    Continue with diet and exercise.    Will continue to monitor.    Vitamin D deficiency  Careful to supplement Vitamin D because we don't want to worsen the hypercalcemia.  To take ergo 50K weekly for 4 weeks.  Then to start vitamin D3 2000iu daily afterward.    Will recheck labs upon completion of ergo.    Osteopenia  Risks include hyperparathyroidism    Reviewed basics of quantity versus quality  Reassuring she is not fracturing      RDA of calcium (5236-2826 mg /day in divided doses) and vitamin D 2000iu a day  discussed  Calcium from food sources preferred  Fall precautions emphasized  +weight bearing exercise  [email or web address] website information given         Zainab Vee MD  Endocrinology Fellow       This case has been reviewed with staff, Dr. Lai.

## 2018-01-31 NOTE — PATIENT INSTRUCTIONS
Understanding Primary Hyperparathyroidism    The parathyroid glands are 4 tiny glands in the neck. They make parathyroid hormone (PTH). PTH controls the amount of calcium and phosphorus in your blood. Primary hyperparathyroidism is when there is too much PTH in your blood. It occurs when one or more of the glands are too active.  The job of PTH is to tell the body how to control calcium. Too much PTH means the body increases the amount of calcium in the blood. This leads to a problem called hypercalcemia. This is when the amount of calcium in the blood is too high. Hypercalcemia can cause serious health problems.  Causes  Hyperparathyroidism can occur when a parathyroid gland becomes enlarged. It can also occur as a complication of another health conditions, such as kidney failure or rickets. In these conditions, calcium is usually not high. This is called secondary hyperparathyroidism.  Whos at risk  The risk factors for this condition include:  · Being a woman (its less common in men)  · Being older (its more likely to occur with age)  · Having parents or siblings with the condition or other endocrine tumors  · Having certain kidney problems  · Taking certain medicines  · Having had radiation treatment in the head or neck  Symptoms  Symptoms of the condition can include:  · Muscle weakness  · Depression  · Tiredness  · Confusion and memory loss  · Poor memory  · Nausea and vomiting  · Pain in the stomach area (abdomen)  · Hard stools (constipation)  · Stomach ulcers  · Need to urinate often  · Kidney stones  · Joint or bone pain  · Bone disease (osteopenia or osteoporosis), an increase in bone fractures  · High blood pressure  · Increased thirst  Treatment  If primary hyperparathyroidism is not treated, it can get worse over time. Treatments include:  · Surgery. This may be done to remove any enlarged parathyroid glands. This lets the amount of calcium in the blood go back to normal. You may need to take  vitamin D and calcium supplements before the surgery. This will reduce the risk of low calcium after the surgery.   · Medicine. This lowers the amount of parathyroid hormone made by the overactive glands.   You and your healthcare provider can discuss your treatment options. Make sure to ask any questions you have.  Date Last Reviewed: 11/1/2016  © 8728-6759 [employer]. [address] [place] [address], [place] [address]. All rights reserved. This information is not intended as a substitute for professional medical care. Always follow your healthcare professional's instructions.

## 2018-01-31 NOTE — ASSESSMENT & PLAN NOTE
History of a1c 6.0%.  Most recent 5.5%    Continue with diet and exercise.    Will continue to monitor.

## 2018-01-31 NOTE — ASSESSMENT & PLAN NOTE
Discussed weight loss, exercise, and healthy eating.  Decreases risk of DM    Goal of 7% weight loss.

## 2018-01-31 NOTE — ASSESSMENT & PLAN NOTE
Risks include hyperparathyroidism    Reviewed basics of quantity versus quality  Reassuring she is not fracturing      RDA of calcium (4649-3231 mg /day in divided doses) and vitamin D 2000iu a day  discussed  Calcium from food sources preferred  Fall precautions emphasized  +weight bearing exercise  [email or web address] website information given

## 2018-01-31 NOTE — ASSESSMENT & PLAN NOTE
Not a surgical candidate at this time (>11, renal issue, osteoporosis, age <50y)    Elevated PTH in the setting of hypercalcemia and vitamin D deficiency  Previous work up unremarkable. Urine calcium wnl.     Imaging not indicated at this time.  After labwork will re-evaluate the need for imaging prior to surgical resection if lab work indicates, as parathyroid adenoma is the leading cause of primary hyperparathyroidism.    Reviewed DEXA Scan.  See osteopenia for further management.    Will need to replete Vitamin D and repeat.

## 2018-02-04 ENCOUNTER — PATIENT MESSAGE (OUTPATIENT)
Dept: INTERNAL MEDICINE | Facility: CLINIC | Age: 53
End: 2018-02-04

## 2018-02-05 ENCOUNTER — TELEPHONE (OUTPATIENT)
Dept: OBSTETRICS AND GYNECOLOGY | Facility: CLINIC | Age: 53
End: 2018-02-05

## 2018-02-05 ENCOUNTER — TELEPHONE (OUTPATIENT)
Dept: HEPATOLOGY | Facility: CLINIC | Age: 53
End: 2018-02-05

## 2018-02-05 NOTE — TELEPHONE ENCOUNTER
----- Message from Breanna Burgos sent at 2/5/2018  4:08 PM CST -----  Contact: self  _x  1st Request  _  2nd Request  _  3rd Request    Who: pt    Why: pt returning  Call..please advise    What Number to Call Back: [phone]  When to Expect a call back: (Before the end of the day)   -- if call after 3:00 call back will be tomorrow.

## 2018-02-05 NOTE — TELEPHONE ENCOUNTER
----- Message from Ninoska Fernández sent at 1/30/2018  9:26 AM CST -----  Pt records reviewed.   Pt will be referred to Hepatology.    Initial referral received  from the [facility].   Referring Provider/diagnosis  DEVON ALEMAN Provider:  Diagnosis: Elevated LFTs  Hepatomegaly          Referral letter sent to provider and patient.

## 2018-02-15 ENCOUNTER — TELEPHONE (OUTPATIENT)
Dept: OBSTETRICS AND GYNECOLOGY | Facility: CLINIC | Age: 53
End: 2018-02-15

## 2018-02-15 NOTE — TELEPHONE ENCOUNTER
"Called Caitlyn Perez  and notified her of her negative genetic testing results with "no clinically significant mutations identified."  Also informed her that her Codey Araiza remaining lifetime breast cancer risk is 21.2% which puts her in the higher risk category and that modified management would be appropriate.  This would consist of closer monitoring of her breasts and a consult to the breast center.       Caitlyn Perez  was open to scheduling consult for March 26th at 9am.  Reminder letter printed and sent via mail.  "

## 2018-02-28 RX ORDER — AMLODIPINE BESYLATE 10 MG/1
TABLET ORAL
Qty: 90 TABLET | Refills: 3 | Status: SHIPPED | OUTPATIENT
Start: 2018-02-28 | End: 2019-04-02 | Stop reason: SDUPTHER

## 2018-02-28 NOTE — TELEPHONE ENCOUNTER
----- Message from Jeanette Kelly sent at 2/28/2018  9:01 AM CST -----  Contact: ANDRES SHORT [[id number]]      x_  1st Request  _  2nd Request  _  3rd Request    Please refill the medication(s) listed below. Please call the patient when the prescription(s) is ready for  at this phone number  [phone]    Pt is requesting a 90 day supply because she would pay the same for the 90 day supply.      Medication #1amLODIPine (NORVASC) 10 MG tablet 90     Medication #2      Preferred Pharmacy:

## 2018-03-05 ENCOUNTER — OFFICE VISIT (OUTPATIENT)
Dept: HEPATOLOGY | Facility: CLINIC | Age: 53
End: 2018-03-05
Payer: MEDICARE

## 2018-03-05 ENCOUNTER — LAB VISIT (OUTPATIENT)
Dept: LAB | Facility: HOSPITAL | Age: 53
End: 2018-03-05
Payer: MEDICARE

## 2018-03-05 VITALS
RESPIRATION RATE: 18 BRPM | TEMPERATURE: 97 F | WEIGHT: 203.25 LBS | HEIGHT: 64 IN | SYSTOLIC BLOOD PRESSURE: 159 MMHG | HEART RATE: 72 BPM | BODY MASS INDEX: 34.7 KG/M2 | OXYGEN SATURATION: 98 % | DIASTOLIC BLOOD PRESSURE: 87 MMHG

## 2018-03-05 DIAGNOSIS — E21.3 HYPERPARATHYROIDISM: ICD-10-CM

## 2018-03-05 DIAGNOSIS — R40.0 SOMNOLENCE: ICD-10-CM

## 2018-03-05 DIAGNOSIS — R74.8 ELEVATED LIVER ENZYMES: Primary | ICD-10-CM

## 2018-03-05 LAB
25(OH)D3+25(OH)D2 SERPL-MCNC: 48 NG/ML
ALBUMIN SERPL BCP-MCNC: 3.9 G/DL
ANION GAP SERPL CALC-SCNC: 7 MMOL/L
BUN SERPL-MCNC: 17 MG/DL
CALCIUM SERPL-MCNC: 10.5 MG/DL
CHLORIDE SERPL-SCNC: 106 MMOL/L
CO2 SERPL-SCNC: 27 MMOL/L
CREAT SERPL-MCNC: 1 MG/DL
EST. GFR  (AFRICAN AMERICAN): >60 ML/MIN/1.73 M^2
EST. GFR  (NON AFRICAN AMERICAN): >60 ML/MIN/1.73 M^2
GLUCOSE SERPL-MCNC: 98 MG/DL
PHOSPHATE SERPL-MCNC: 3.1 MG/DL
POTASSIUM SERPL-SCNC: 4.4 MMOL/L
PTH-INTACT SERPL-MCNC: 237 PG/ML
SODIUM SERPL-SCNC: 140 MMOL/L

## 2018-03-05 PROCEDURE — 3077F SYST BP >= 140 MM HG: CPT | Mod: S$GLB,,, | Performed by: PHYSICIAN ASSISTANT

## 2018-03-05 PROCEDURE — 80069 RENAL FUNCTION PANEL: CPT

## 2018-03-05 PROCEDURE — 3079F DIAST BP 80-89 MM HG: CPT | Mod: S$GLB,,, | Performed by: PHYSICIAN ASSISTANT

## 2018-03-05 PROCEDURE — 82306 VITAMIN D 25 HYDROXY: CPT

## 2018-03-05 PROCEDURE — 99204 OFFICE O/P NEW MOD 45 MIN: CPT | Mod: S$GLB,,, | Performed by: PHYSICIAN ASSISTANT

## 2018-03-05 PROCEDURE — 83970 ASSAY OF PARATHORMONE: CPT

## 2018-03-05 PROCEDURE — 36415 COLL VENOUS BLD VENIPUNCTURE: CPT

## 2018-03-05 PROCEDURE — 99999 PR PBB SHADOW E&M-EST. PATIENT-LVL IV: CPT | Mod: PBBFAC,,, | Performed by: PHYSICIAN ASSISTANT

## 2018-03-05 RX ORDER — AMOXICILLIN AND CLAVULANATE POTASSIUM 875; 125 MG/1; MG/1
TABLET, FILM COATED ORAL
COMMUNITY
Start: 2018-03-02 | End: 2018-05-07 | Stop reason: ALTCHOICE

## 2018-03-05 NOTE — LETTER
March 5, 2018      Jeanette Delacruz MD  [address]  [facility] [address]  [facility] [address]           [facility] Hepat[facility]  [address]  [facility] [address]  Phone: [phone]  Fax: [phone]          Patient: Caitlyn Perez   MR Number: [id number]   YOB: 1965   Date of Visit: 3/5/2018       Dear Dr. Jeanette Delacruz:    Thank you for referring Caitlyn Perez to me for evaluation. Attached you will find relevant portions of my assessment and plan of care.    If you have questions, please do not hesitate to call me. I look forward to following Caitlyn Perez along with you.    Sincerely,    Justus Llanos PA-C    Enclosure  CC:  No Recipients    If you would like to receive this communication electronically, please contact [email or web address] or [phone] to request more information on [employer] access.    For providers and/or their staff who would like to refer a patient to [facility], please contact us through our one-stop-shop provider referral line, [facility], at [phone].    If you feel you have received this communication in error or would no longer like to receive these types of communications, please e-mail [email or web address]

## 2018-03-05 NOTE — PROGRESS NOTES
I have personally performed a face to face diagnostic evaluation on this patient. I have reviewed and agree with today's findings and the care plan outlined by Justus Llanos PA-C      My findings are as follows:  Patient presents with elevated liver enzymes. Recommend serologic w/u for elevated liver enzymes and fibroscan to assess for fibrosis. Also needs sleep study and toxicology screen. The patient fell asleep during the interview.       she will return to Justus Llanos PA-C  for follow-up.

## 2018-03-05 NOTE — Clinical Note
Eben Delacruz I saw this patient today for her elevated liver enzymes. During our visit, pt fell asleep mid conversation and I had to wake her up. As I have not seen her before, I am uncertain if this is her baseline. She does report daytime fatigue and reports she snores so perhaps she needs a sleep study. I ordered a tox screen as well. I advised her not to drive if overly fatigued, but I just wanted to make you aware.

## 2018-03-05 NOTE — PROGRESS NOTES
HEPATOLOGY CLINIC VISIT NOTE     REFERRING PROVIDER: Dr. Jeanette Delacruz    REASON FOR VISIT: elevated liver enzymes     HISTORY: This is a 52 y.o. Black or  female here for elevated transaminases, referred by her PCP. On most recent labs, AST 36, ALT 59 and alk phos 161. She has normal synthetic liver function. Her PLTs are preserved. She had an U/S 2017 noting hepatomegaly and cholelithiasis. Her enzymes have been mildly elevated intermittently since . She denies previous h/o liver disease. She denies drug and alcohol use. She has had a negative acute hep panel in the past.     She does have several risk factors for NAFLD/PRADO, BMI of 34, HTN, HLD, h/o DMII- currently diet controlled    Of note, patient falls asleep during mid conversation during visit. She reports daytime fatigue and snoring. She has not had a sleep study. I will get a tox screen today. Pt does drive, instructed not to drive if overly tired.     Liver staging:  No formal staging  AST 36, ALT 59  Tbili WNL  Albumin WNL  PLTs >150     Past Medical History:   Diagnosis Date    Abnormal Pap smear of cervix     Aneurysm     CVA (cerebral infarction) 2012    Diabetes mellitus, type 2     Family history of breast cancer     Hypertension     Parathyroid disease     Stroke     Lt sided weakness     Past Surgical History:   Procedure Laterality Date     SECTION      x 4    COLONOSCOPY N/A 2016    Procedure: COLONOSCOPY;  Surgeon: Gio Bettencourt MD;  Location: [address]);  Service: Endoscopy;  Laterality: N/A;    COLONOSCOPY N/A 2017    Procedure: COLONOSCOPY;  Surgeon: Gio Bettencourt MD;  Location: [address]);  Service: Endoscopy;  Laterality: N/A;    TUBAL LIGATION       FAMILY HISTORY: Negative for liver disease    SOCIAL HISTORY:   History   Smoking Status    Never Smoker   Smokeless Tobacco    Never Used       History   Alcohol Use    Yes       History   Drug Use No      ROS:   No fever, chills, weight loss, (+)fatigue, (+) snores   No chest pain, dyspnea, cough  No abdominal pain, nausea, vomiting  No skin rashes   No headaches, visual changes  No lower extremity edema  No depression or anxiety      PHYSICAL EXAM:  Friendly Black or  female, in no acute distress; alert and oriented to person, place and time  VITALS: reviewed  HEENT: Sclerae anicteric.   NECK: Supple  CVS: Regular rate and rhythm. No murmurs  LUNGS: Normal respiratory effort. Clear bilaterally  ABDOMEN: Flat, soft, nontender. No organomegaly or masses. No ascites or hernias.    SKIN: Warm and dry. No jaundice, No obvious rashes.   EXTREMITIES: No lower extremity edema  NEURO/PSYCH: Normal gate. Memory intact. Thought and speech pattern appropriate. Behavior normal. No depression or anxiety noted.    RECENT LABS:  Lab Results   Component Value Date    WBC 6.30 01/17/2018    HGB 13.9 01/17/2018     01/17/2018     No results found for: INR  Lab Results   Component Value Date    AST 36 01/29/2018    ALT 59 (H) 01/29/2018    BILITOT 0.6 01/29/2018    ALBUMIN 4.2 01/29/2018    ALKPHOS 161 (H) 01/29/2018    CREATININE 1.0 01/17/2018    BUN 15 01/17/2018     01/17/2018    K 4.0 01/17/2018     RECENT IMAGING:  U/S abdomen 06/2017  Narrative     HISTORY:  Elevated LFTs.     TECHNIQUE: Limited abdominal ultrasound     COMPARISON: None.    FINDINGS:    Pancreas (visualized portion): Normal    Liver: Mild enlarged, measuring 17.6 mm exiting below the caudal margin, with homogeneous echotexture.  No focal lesions.      Gallbladder: Multiple mobile stones measuring up to 1.9 cm  No wall thickening.  No sonographic [place] sign.       Bile ducts: Common bile duct is not dilated, 3 mm.  No dilated intrahepatic radicles.     Aorta (visualized portion): No aneurysm.    Right Kidney: 10.9 cm. No focal abnormalities. No pelvocaliectasis.    General: No ascites.   Impression       Cholelithiasis without  cholecystitis.    Mild hepatomegaly.     ASSESSMENT  52 y.o. Black or  female with:  1. ELEVATED LIVER ENZYMES  -- suspect NAFLD/PRADO  -- full serological work up  -- assess fibrosis with fibroscan  -- Weight loss goal of 15-20 lbs  -- Low fat, low cholesterol, low carb, high fiber diet  --  Exercise, 5 days a week, 30 min a day  --  Recommend good control of cholesterol, blood pressure, blood sugar levels    2. SOMNOLENCE  -- pt fell asleep during mid conversation in visit, does report snoring and daytime fatigue, ?sleep apnea  -- tox screen  -- will notify PCP    EDUCATION:  We discussed the manifestations of NAFLD. At this time there is no FDA approved therapy for NAFLD.   The patient and I discussed the importance of diet, exercise, and weight loss for management of NAFLD. We discussed a low fat, low carb/low sugar, high fiber diet, and a goal of 30 minutes of exercise 5 times per week.   Pt is aware that fatty liver can progress to steatohepatitis and possibly to cirrhosis, putting one at increased risk for liver cancer, liver failure, and death.        PLAN:  1. Labs today  2. Fibroscan  3. F/u visit in 12 weeks     Thank you for allowing me to participate in the care of Caitlyn Llanos PA-C

## 2018-03-08 ENCOUNTER — PATIENT MESSAGE (OUTPATIENT)
Dept: INTERNAL MEDICINE | Facility: CLINIC | Age: 53
End: 2018-03-08

## 2018-03-08 DIAGNOSIS — Z91.89 AT HIGH RISK FOR BREAST CANCER: Primary | ICD-10-CM

## 2018-03-13 ENCOUNTER — TELEPHONE (OUTPATIENT)
Dept: HEPATOLOGY | Facility: CLINIC | Age: 53
End: 2018-03-13

## 2018-03-13 NOTE — TELEPHONE ENCOUNTER
----- Message from Justus Llanos PA-C sent at 3/13/2018  1:12 PM CDT -----  Please let her know that these labs are stable. We will discuss more at our f/u visit. She lacks immunity to HAV and HBV and I would recommend vaccination. I will send to pharmacy and they will reach out to patient    Thanks

## 2018-03-15 ENCOUNTER — OFFICE VISIT (OUTPATIENT)
Dept: OBSTETRICS AND GYNECOLOGY | Facility: CLINIC | Age: 53
End: 2018-03-15
Attending: OBSTETRICS & GYNECOLOGY
Payer: MEDICARE

## 2018-03-15 VITALS
BODY MASS INDEX: 34.46 KG/M2 | SYSTOLIC BLOOD PRESSURE: 120 MMHG | WEIGHT: 206.81 LBS | DIASTOLIC BLOOD PRESSURE: 78 MMHG | HEIGHT: 65 IN

## 2018-03-15 DIAGNOSIS — R87.615 UNSATISFACTORY CERVICAL PAPANICOLAOU SMEAR: Primary | ICD-10-CM

## 2018-03-15 DIAGNOSIS — Z80.3 FAMILY HISTORY OF MALIGNANT NEOPLASM OF BREAST: ICD-10-CM

## 2018-03-15 PROBLEM — Z12.11 SCREEN FOR COLON CANCER: Status: RESOLVED | Noted: 2017-01-25 | Resolved: 2018-03-15

## 2018-03-15 PROCEDURE — 99999 PR PBB SHADOW E&M-EST. PATIENT-LVL III: CPT | Mod: PBBFAC,,, | Performed by: OBSTETRICS & GYNECOLOGY

## 2018-03-15 PROCEDURE — 88175 CYTOPATH C/V AUTO FLUID REDO: CPT

## 2018-03-15 PROCEDURE — 99499 UNLISTED E&M SERVICE: CPT | Mod: S$GLB,,, | Performed by: OBSTETRICS & GYNECOLOGY

## 2018-03-15 NOTE — PROGRESS NOTES
SUBJECTIVE:   52 y.o. female   for repeat PAP. No LMP recorded. Patient is postmenopausal..  Last PAP was unsatisfactory.         Past Medical History:   Diagnosis Date    Abnormal Pap smear of cervix     Aneurysm     CVA (cerebral infarction) 2012    Diabetes mellitus, type 2     Family history of breast cancer     Hypertension     Parathyroid disease     Stroke     Lt sided weakness     Past Surgical History:   Procedure Laterality Date     SECTION      x 4    COLONOSCOPY N/A 2016    Procedure: COLONOSCOPY;  Surgeon: Gio Bettencourt MD;  [facility]  Service: Endoscopy;  Laterality: N/A;    COLONOSCOPY N/A 2017    Procedure: COLONOSCOPY;  Surgeon: Gio Bettencourt MD;  Location: [facility] ([facility]);  Service: Endoscopy;  Laterality: N/A;    TUBAL LIGATION       Social History     Social History    Marital status:      Spouse name: N/A    Number of children: 6    Years of education: 12     Occupational History    disabled      previously [employer] manager     Social History Main Topics    Smoking status: Never Smoker    Smokeless tobacco: Never Used    Alcohol use Yes    Drug use: No    Sexual activity: Not Currently     Partners: Male     Birth control/ protection: None     Other Topics Concern    Not on file     Social History Narrative    Walks for exercise    4 of her 6 children live with her.  15, 15, 17, yo and 27 live with her     Family History   Problem Relation Age of Onset    Diabetes type II Mother     Kidney disease Mother      on HD due to diabetes    Hypertension Mother     Diabetes Mother     Breast cancer Father 83    Breast cancer Paternal Uncle     Breast cancer Paternal Aunt 17    Lung cancer Maternal Aunt     Diabetes Maternal Aunt     Diabetes Brother     Diabetes Maternal Uncle     Diabetes Maternal Aunt     Diabetes Maternal Aunt     Diabetes Maternal Aunt     Diabetes Maternal Uncle      Diabetes Maternal Uncle     Diabetes Maternal Uncle     Breast cancer Paternal Grandmother      OB History    Para Term  AB Living   5 5 5     6   SAB TAB Ectopic Multiple Live Births         1 6      # Outcome Date GA Lbr Dougie/2nd Weight Sex Delivery Anes PTL Lv   5 Term            4 Term            3 Term            2 Term            1 Term                     Current Outpatient Prescriptions   Medication Sig Dispense Refill    amLODIPine (NORVASC) 10 MG tablet TAKE 1 TABLET(10 MG) BY MOUTH EVERY DAY 90 tablet 3    amoxicillin-clavulanate 875-125mg (AUGMENTIN) 875-125 mg per tablet       aspirin (ECOTRIN) 81 MG EC tablet Take 81 mg by mouth once daily.      blood sugar diagnostic Strp True Test blood glucose test strips & lancets. Check blood sugars daily. 3 month supply. 100 each 3    cholecalciferol, vitamin D3, 2,000 unit Cap Take 1 capsule (2,000 Units total) by mouth once daily. 90 capsule 2    CYANOCOBALAMIN, VITAMIN B-12, (VITAMIN B-12 ORAL) Take 1 tablet by mouth once daily.      KRILL OIL ORAL Take 1 capsule by mouth once daily.      MULTIVITAMIN W-MINERALS/LUTEIN (CENTRUM SILVER ORAL) Take 1 tablet by mouth once daily.      valsartan (DIOVAN) 160 MG tablet Take 1 tablet (160 mg total) by mouth once daily. 90 tablet 0     No current facility-administered medications for this visit.      Allergies: Patient has no known allergies.     ROS:  Constitutional: no weight loss, weight gain, fever, fatigue  Eyes:  No vision changes, glasses/contacts  ENT/Mouth: No ulcers, sinus problems, ears ringing, headache  Cardiovascular: No inability to lie flat, chest pain, exercise intolerance, swelling, heart palpitations  Respiratory: No wheezing, coughing blood, shortness of breath, or cough  Gastrointestinal: No diarrhea, bloody stool, nausea/vomiting, constipation, gas, hemorrhoids  Genitourinary: No blood in urine, painful urination, urgency of urination, frequency of urination, incomplete  "emptying, incontinence, abnormal bleeding, painful periods, heavy periods, vaginal discharge, vaginal odor, painful intercourse, sexual problems, bleeding after intercourse.  Musculoskeletal: No muscle weakness  Skin/Breast: No painful breasts, nipple discharge, masses, rash, ulcers  Neurological: No passing out, seizures, numbness, headache  Endocrine: No diabetes, hypothyroid, hyperthyroid, hot flashes, hair loss, abnormal hair growth, ance  Psychiatric: No depression, crying  Hematologic: No bruises, bleeding, swollen lymph nodes, anemia.      OBJECTIVE:   The patient appears well, alert, oriented x 3, in no distress.  /78   Ht 5' 5" (1.651 m)   Wt 93.8 kg (206 lb 12.7 oz)   BMI 34.41 kg/m²   ABDOMEN: no hernias, masses, or hepatosplenomegaly  GENITALIA: normal external genitalia, no erythema, no discharge  URETHRA: normal urethra, normal urethral meatus  VAGINA: Normal  CERVIX: no lesions or cervical motion tenderness    ASSESSMENT:   1. Unsatisfactory cervical Papanicolaou smear  Liquid-based pap smear, screening   2. Family history of malignant neoplasm of breast         PLAN:   Orders Placed This Encounter    Liquid-based pap smear, screening     Discussed unsatisfactory PAP, vaginal dryness  Return to clinic prn  "

## 2018-03-15 NOTE — LETTER
March 15, 2018      Jeanette Delacruz MD  [address]  [facility] [address]  [facility] [address]           [facility] [address]  [address] [facility] [address]  [facility] [address]  Phone: [phone]  Fax: [phone]          Patient: Caitlyn Perez   MR Number: [id number]   YOB: 1965   Date of Visit: 3/15/2018       Dear Dr. Jeanette Delacruz:    Thank you for referring Caitlyn Perez to me for evaluation. Attached you will find relevant portions of my assessment and plan of care.    If you have questions, please do not hesitate to call me. I look forward to following Caitlyn Perez along with you.    Sincerely,    Abby Mathis MD    Enclosure  CC:  No Recipients    If you would like to receive this communication electronically, please contact [email or web address] or [phone] to request more information on [employer] Link access.    For providers and/or their staff who would like to refer a patient to [facility], please contact us through our one-stop-shop provider referral line, [facility], at [phone].    If you feel you have received this communication in error or would no longer like to receive these types of communications, please e-mail [email or web address]

## 2018-03-16 ENCOUNTER — TELEPHONE (OUTPATIENT)
Dept: INTERNAL MEDICINE | Facility: CLINIC | Age: 53
End: 2018-03-16

## 2018-03-16 DIAGNOSIS — R40.0 DAYTIME SOMNOLENCE: Primary | ICD-10-CM

## 2018-03-16 NOTE — TELEPHONE ENCOUNTER
Please let ms bragg know that I would like her to see sleep clinic to be evaluated for sleep apnea. Referral placed. Thanks. Please arrange

## 2018-03-16 NOTE — TELEPHONE ENCOUNTER
----- Message from Justus Llanos PA-C sent at 3/5/2018  1:09 PM CST -----  Eben Delacruz  I saw this patient today for her elevated liver enzymes. During our visit, pt fell asleep mid conversation and I had to wake her up. As I have not seen her before, I am uncertain if this is her baseline. She does report daytime fatigue and reports she snores so perhaps she needs a sleep study. I ordered a tox screen as well. I advised her not to drive if overly fatigued, but I just wanted to make you aware.

## 2018-03-21 ENCOUNTER — TELEPHONE (OUTPATIENT)
Dept: INTERNAL MEDICINE | Facility: CLINIC | Age: 53
End: 2018-03-21

## 2018-03-21 NOTE — TELEPHONE ENCOUNTER
----- Message from Debi Velázquez sent at 3/19/2018  2:07 PM CDT -----  Contact: ANDRES SHORT [[id number]]  _  1st Request  _  2nd Request  _  3rd Request        Who: ANDRES SHORT [[id number]]    Why: patient states she would like a call back in regards to recent test results. Patient asks to please call back after 4P    What Number to Call Back: [phone]    When to Expect a call back: (Before the end of the day)   -- if call after 3:00 call back will be tomorrow.

## 2018-03-26 ENCOUNTER — TELEPHONE (OUTPATIENT)
Dept: SURGERY | Facility: CLINIC | Age: 53
End: 2018-03-26

## 2018-03-26 NOTE — TELEPHONE ENCOUNTER
Contacted the patient regarding appointment scheduled for today Monday 3/26/18 with Odalis Delcid NP.  Informed the patient that Odalis is out of the office ill on today and will need to reschedule the appointment.  Appointment rescheduled for Monday 4/23/18 at 9 am.  The patient voiced understanding of the appointment.  Reminder letter mailed to the patient.

## 2018-04-23 ENCOUNTER — TELEPHONE (OUTPATIENT)
Dept: SURGERY | Facility: CLINIC | Age: 53
End: 2018-04-23

## 2018-04-23 NOTE — TELEPHONE ENCOUNTER
Contacted the patient regarding appointment scheduled for today Monday 4/23/18.  The patient NO SHOWED the appointment.  The patient did not answer, no voicemail to leave message for the patient.

## 2018-05-01 LAB
MISCELLANEOUS TEST NAME: NORMAL
REFERENCE LAB: NORMAL
SPECIMEN TYPE: NORMAL
TEST RESULT: NORMAL

## 2018-05-07 ENCOUNTER — PATIENT MESSAGE (OUTPATIENT)
Dept: INTERNAL MEDICINE | Facility: CLINIC | Age: 53
End: 2018-05-07

## 2018-05-07 ENCOUNTER — HOSPITAL ENCOUNTER (OUTPATIENT)
Dept: RADIOLOGY | Facility: OTHER | Age: 53
Discharge: HOME | End: 2018-05-07
Attending: NURSE PRACTITIONER
Payer: MEDICARE

## 2018-05-07 ENCOUNTER — OFFICE VISIT (OUTPATIENT)
Dept: INTERNAL MEDICINE | Facility: CLINIC | Age: 53
End: 2018-05-07
Payer: MEDICARE

## 2018-05-07 VITALS
BODY MASS INDEX: 34.89 KG/M2 | HEART RATE: 83 BPM | SYSTOLIC BLOOD PRESSURE: 132 MMHG | OXYGEN SATURATION: 97 % | HEIGHT: 64 IN | WEIGHT: 204.38 LBS | TEMPERATURE: 98 F | DIASTOLIC BLOOD PRESSURE: 85 MMHG

## 2018-05-07 DIAGNOSIS — R07.81 RIB PAIN ON LEFT SIDE: Primary | ICD-10-CM

## 2018-05-07 DIAGNOSIS — H91.93 BILATERAL HEARING LOSS, UNSPECIFIED HEARING LOSS TYPE: ICD-10-CM
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
## 2018-05-07 DIAGNOSIS — R07.81 RIB PAIN ON LEFT SIDE: ICD-10-CM

## 2018-05-07 DIAGNOSIS — I10 ESSENTIAL HYPERTENSION: ICD-10-CM

## 2018-05-07 DIAGNOSIS — E66.01 CLASS 2 SEVERE OBESITY DUE TO EXCESS CALORIES WITH SERIOUS COMORBIDITY AND BODY MASS INDEX (BMI) OF 35.0 TO 35.9 IN ADULT: ICD-10-CM

## 2018-05-07 PROCEDURE — 99499 UNLISTED E&M SERVICE: CPT | Mod: S$GLB,,, | Performed by: NURSE PRACTITIONER

## 2018-05-07 PROCEDURE — 71046 X-RAY EXAM CHEST 2 VIEWS: CPT | Mod: 26,,, | Performed by: RADIOLOGY

## 2018-05-07 PROCEDURE — 71046 X-RAY EXAM CHEST 2 VIEWS: CPT | Mod: TC,FY

## 2018-05-07 PROCEDURE — 99999 PR PBB SHADOW E&M-EST. PATIENT-LVL IV: CPT | Mod: PBBFAC,,, | Performed by: NURSE PRACTITIONER

## 2018-05-07 PROCEDURE — 3008F BODY MASS INDEX DOCD: CPT | Mod: CPTII,S$GLB,, | Performed by: NURSE PRACTITIONER

## 2018-05-07 PROCEDURE — 3079F DIAST BP 80-89 MM HG: CPT | Mod: CPTII,S$GLB,, | Performed by: NURSE PRACTITIONER

## 2018-05-07 PROCEDURE — 99214 OFFICE O/P EST MOD 30 MIN: CPT | Mod: S$GLB,,, | Performed by: NURSE PRACTITIONER

## 2018-05-07 PROCEDURE — 3075F SYST BP GE 130 - 139MM HG: CPT | Mod: CPTII,S$GLB,, | Performed by: NURSE PRACTITIONER

## 2018-05-07 NOTE — PATIENT INSTRUCTIONS
Rib Contusion or Minor Fracture    A rib contusion is a bruise to one or more rib bones. It may cause pain, tenderness, swelling, and a purplish tint to the skin. There may be a sharp pain with each breath. A rib contusion takes anywhere from a few days to a few weeks to heal. A minor rib fracture or break may cause the same symptoms as a rib contusion. The small crack may not be seen on a regular chest X-ray. Treatment for both problems is the same.  Home care  · You may use over-the-counter pain medicine to control pain, unless another pain medicine was prescribed. If you have chronic liver or kidney disease or ever had a stomach ulcer or GI bleeding, talk with your healthcare provider before using these medicines.  · Rest. Do not lift anything heavy or do any activity that causes pain.  · Apply an ice pack over the injured area for 15 to 20 minutes every 1 to 2 hours. You should do this for the first 24 to 48 hours. You can make an ice pack by filling a plastic bag that seals at the top with ice cubes and then wrapping it with a thin towel. Continue with ice packs as needed for the relief of pain and swelling.  · The first 3 to 4 weeks of healing will be the most painful. If your pain is not under control with the treatment given, call your healthcare provider. Sometimes a stronger pain medicine may be needed. A nerve block can be done in case of severe pain. It will numb the nerve between the ribs.  Follow-up care  Follow up with your healthcare provider, or as advised.  If X-rays were taken, you will be told of any new findings that may affect your care.  Call 911  Call 911 if you have:  · Dizziness, weakness or fainting  · Shortness of breath with or without chest discomfort  · New or worsening pain  When to seek medical advice  Call your healthcare provider right away if any of these occur:  · Fever of 100.4°F (38°C) or above lasting for 24 to 48 hours  · Stomach pain  Date Last Reviewed: 12/2/2015  ©  0032-6045 [employer]. [address] [place] [address], [place] [address]. All rights reserved. This information is not intended as a substitute for professional medical care. Always follow your healthcare professional's instructions.

## 2018-05-07 NOTE — PROGRESS NOTES
"Subjective:       Patient ID: Caitlyn Perez is a 52 y.o. female.    Chief Complaint: Flank Pain (Left, under breast, pain exacerbated with breathing/coughing x's 3 days)    Patient presents with LUQ abd pain/rib pain x3 days. Had a fall 3 days prior; fell forward. The pain is present when she takes a breath or moves too quick (sharp pain), not a constant pain. Hurts when she lays down on that side. Has not taken anything to relieve symptoms, she "does not like to take medications." Denies fever/nausea/vomitting/chest pain/SOB. Denies frequency/urgency/dysuria/lower abdominal pain.    Patient states she did not take her BP medication this morning and was rushing to get to appointment.    Complains of hearing loss. Would like a hearing test.        Review of Systems   Constitutional: Negative for activity change, chills, fatigue, fever and unexpected weight change.   HENT: Positive for hearing loss. Negative for congestion, ear pain, rhinorrhea, sinus pain, sinus pressure and sore throat.    Respiratory: Negative for cough, chest tightness, shortness of breath and wheezing.    Cardiovascular: Negative for chest pain, palpitations and leg swelling.   Gastrointestinal: Positive for abdominal pain (Luq). Negative for abdominal distention, blood in stool, constipation, diarrhea, nausea and vomiting.   Genitourinary: Negative for difficulty urinating, dysuria, flank pain, frequency, menstrual problem, pelvic pain and urgency.   Musculoskeletal: Negative for arthralgias, back pain, joint swelling, myalgias, neck pain and neck stiffness.   Skin: Negative for pallor and rash.   Neurological: Negative for dizziness, syncope, weakness, light-headedness and headaches.   Psychiatric/Behavioral: Negative for agitation, confusion and sleep disturbance. The patient is not nervous/anxious.        Objective:       Vitals:    05/07/18 1021 05/07/18 1103   BP: (!) 140/102 132/85   BP Location:  Left arm   Patient Position:  Sitting " "  Pulse: 83    Temp: 98.1 °F (36.7 °C)    SpO2: 97%    Weight: 92.7 kg (204 lb 5.9 oz)    Height: 5' 4" (1.626 m)        Physical Exam   Constitutional: She is oriented to person, place, and time. She appears well-developed and well-nourished. No distress.   HENT:   Head: Normocephalic and atraumatic.   Mouth/Throat: No oropharyngeal exudate.   Eyes: Conjunctivae and EOM are normal. Pupils are equal, round, and reactive to light.   Neck: Normal range of motion. Neck supple. No tracheal deviation present. No thyromegaly present.   Cardiovascular: Normal rate, regular rhythm, normal heart sounds and intact distal pulses.    No murmur heard.  Pulmonary/Chest: Effort normal and breath sounds normal. No respiratory distress. She has no wheezes. She has no rales. She exhibits no tenderness.   Abdominal: Soft. Bowel sounds are normal. She exhibits no distension and no mass. There is tenderness (slight) in the left upper quadrant. There is no rebound and no guarding. No hernia.   Musculoskeletal: Normal range of motion. She exhibits no edema.   LUE weakness   Lymphadenopathy:     She has no cervical adenopathy.   Neurological: She is alert and oriented to person, place, and time.   Skin: Skin is warm and dry. No erythema.   Psychiatric: She has a normal mood and affect. Her behavior is normal. Thought content normal.       Assessment:       1. Rib pain on left side    2. Fall, initial encounter    3. Bilateral hearing loss, unspecified hearing loss type    4. Essential hypertension        Plan:       Rib pain on left side  -     X-Ray Chest PA And Lateral; Future; Expected date: 05/07/2018  -     Recommended Tylenol or NSAIDs (with use limited to 1 week) for pain relief  -     Warm compresses to area    Fall, initial encounter  -     X-Ray Chest PA And Lateral; Future; Expected date: 05/07/2018    Bilateral hearing loss, unspecified hearing loss type  -     Ambulatory Referral to Audiology    Essential hypertension  - " Encouraged to take medications daily  - low salt diet

## 2018-05-07 NOTE — PROGRESS NOTES
Patient, Caitlyn Perez (MRN #[id number]), presented with a recorded BMI of 35.08 kg/m^2 and a documented comorbidity(s):  - Hypertension  to which the severe obesity is a contributing factor. This is consistent with the definition of severe obesity (BMI 35.0-35.9) with comorbidity (ICD-10 E66.01, Z68.35). The patient's severe obesity was monitored, evaluated, addressed and/or treated. This addendum to the medical record is made on 05/07/2018.

## 2018-05-08 ENCOUNTER — CLINICAL SUPPORT (OUTPATIENT)
Dept: AUDIOLOGY | Facility: CLINIC | Age: 53
End: 2018-05-08
Payer: MEDICARE

## 2018-05-08 DIAGNOSIS — H90.3 SENSORINEURAL HEARING LOSS (SNHL) OF BOTH EARS: Primary | ICD-10-CM

## 2018-05-08 PROCEDURE — 92557 COMPREHENSIVE HEARING TEST: CPT | Mod: S$GLB,,, | Performed by: AUDIOLOGIST

## 2018-05-08 PROCEDURE — 92567 TYMPANOMETRY: CPT | Mod: S$GLB,,, | Performed by: AUDIOLOGIST

## 2018-05-08 NOTE — PROGRESS NOTES
Mrs. Perez was seen in the clinic today for a hearing evaluation.  She reported decreased hearing, bilaterally, and a history of noise exposure.     Audiological testing revealed a normal sloping to moderate rising to mild sensorineural hearing loss, bilaterally. A speech reception threshold was obtained at 25 dBHL, bilaterally. Speech discrimination was 92%, bilaterally.    Tympanometry revealed Type A tympanograms, bilaterally.    Recommendations:  1. Otologic evaluation  2. Annual hearing evaluation  3. Noise protection  4. Hearing aid consultation

## 2018-05-09 ENCOUNTER — TELEPHONE (OUTPATIENT)
Dept: INTERNAL MEDICINE | Facility: CLINIC | Age: 53
End: 2018-05-09

## 2018-05-09 NOTE — TELEPHONE ENCOUNTER
----- Message from Jeanette Leticia sent at 5/9/2018  9:10 AM CDT -----  Contact: ANDRES SHORT [[id number]]            Name of Who is Calling:  ANDRES SHORT [[id number]]    What is the request in detail: Patient called for the results of her Chest X-ray. Please call her.       Can the clinic reply by [facility]: No      What Number to Call Back if not in [facility]: [phone]

## 2018-05-22 ENCOUNTER — TELEPHONE (OUTPATIENT)
Dept: SLEEP MEDICINE | Facility: CLINIC | Age: 53
End: 2018-05-22

## 2018-05-24 ENCOUNTER — PATIENT OUTREACH (OUTPATIENT)
Dept: ADMINISTRATIVE | Facility: HOSPITAL | Age: 53
End: 2018-05-24

## 2018-05-24 ENCOUNTER — OFFICE VISIT (OUTPATIENT)
Dept: SLEEP MEDICINE | Facility: CLINIC | Age: 53
End: 2018-05-24
Attending: INTERNAL MEDICINE
Payer: MEDICARE

## 2018-05-24 VITALS
SYSTOLIC BLOOD PRESSURE: 138 MMHG | DIASTOLIC BLOOD PRESSURE: 82 MMHG | WEIGHT: 201.75 LBS | BODY MASS INDEX: 33.61 KG/M2 | HEIGHT: 65 IN | HEART RATE: 90 BPM

## 2018-05-24 DIAGNOSIS — R73.02 IGT (IMPAIRED GLUCOSE TOLERANCE): Primary | ICD-10-CM

## 2018-05-24 DIAGNOSIS — G47.30 SLEEP APNEA, UNSPECIFIED TYPE: Primary | ICD-10-CM

## 2018-05-24 PROCEDURE — 99999 PR PBB SHADOW E&M-EST. PATIENT-LVL III: CPT | Mod: PBBFAC,,, | Performed by: PSYCHIATRY & NEUROLOGY

## 2018-05-24 PROCEDURE — 3075F SYST BP GE 130 - 139MM HG: CPT | Mod: CPTII,S$GLB,, | Performed by: PSYCHIATRY & NEUROLOGY

## 2018-05-24 PROCEDURE — 3079F DIAST BP 80-89 MM HG: CPT | Mod: CPTII,S$GLB,, | Performed by: PSYCHIATRY & NEUROLOGY

## 2018-05-24 PROCEDURE — 99204 OFFICE O/P NEW MOD 45 MIN: CPT | Mod: S$GLB,,, | Performed by: PSYCHIATRY & NEUROLOGY

## 2018-05-24 PROCEDURE — 3008F BODY MASS INDEX DOCD: CPT | Mod: CPTII,S$GLB,, | Performed by: PSYCHIATRY & NEUROLOGY

## 2018-05-24 NOTE — PROGRESS NOTES
[facility] is committed to your overall health.  To help you get the most out of each of your visits, we will review your information to make sure you are up to date on all of your recommended tests and/or procedures.       Your PCP  Jeanette Delacruz MD   found that you may be due for:       Health Maintenance Due   Topic Date Due    Pneumococcal PPSV23 (Medium Risk) (1) 07/12/1982    Eye Exam  12/06/2017    Foot Exam  12/13/2017             If you have had any of the above done at another facility, please bring the records or information with you so that your record at [facility] will be complete.  If you would like to schedule any of these, please contact me.     If you are currently taking medication, please bring it with you to your appointment for review.     Also, if you have any type of Advanced Directives, please bring them with you to your office visit so we may scan them into your chart.       Thank you for Choosing [facility] for your healthcare needs.        Additional Information  If you have questions, you can email [email or web address] or call [phone]  to talk to our [facility] staff. Remember, [facility] is NOT to be used for urgent needs. For medical emergencies, dial 911.

## 2018-05-24 NOTE — PATIENT INSTRUCTIONS
Patient is candidate for home sleep testing.  Set up testing with [facility].  Scheduled by [facility]    1. [facility] will contact you to schedule your sleep study [phone] (ext [phone])  2. Sleep Lab is located in the [facility], take Latty elevator to 7th floor; You will see sign for [facility]

## 2018-05-24 NOTE — LETTER
May 24, 2018      Jeanette Delacruz MD  [address]  [address]  [facility] [address]           [facility]  [address]  [facility] [address]  Phone: [phone]          Patient: Caitlyn Perez   MR Number: [id number]   YOB: 1965   Date of Visit: 5/24/2018       Dear Dr. Jeanette Delacruz:    Thank you for referring Caitlyn Perez to me for evaluation. Attached you will find relevant portions of my assessment and plan of care.    If you have questions, please do not hesitate to call me. I look forward to following Caitlyn Perez along with you.    Sincerely,    Jose Eduardo New MD    Enclosure  CC:  No Recipients    If you would like to receive this communication electronically, please contact [email or web address] or [phone] to request more information on [email or web address] Link access.    For providers and/or their staff who would like to refer a patient to [facility], please contact us through our one-stop-shop provider referral line, [facility], at [phone].    If you feel you have received this communication in error or would no longer like to receive these types of communications, please e-mail [email or web address]

## 2018-05-24 NOTE — PROGRESS NOTES
"This 52 y.o. female patient presents for the evaluation of possible obstructive sleep apnea. Per other provider note "She does report daytime fatigue and reports she snores so perhaps she needs a sleep study."    Recently fell asleep in doctor's office during conversation.    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 She would then always take a nap around 10:30-11 am, and she reports she has continued to keep this schedule, even though no longer working.    Also sometimes snoozes between 3-4 pm    ESS = 17    Everyone of her family members says she snores, loudly  No witnessed apnea    Stroke on left side in     SLEEP ROUTINE:   Bed partner: grandkid   Time to bed: 8:30-9:30 pm   Sleep onset latency: 1/2 hour   Disruptions or awakenings: once   Wakeup time: 3:30 am   Perceived sleep quality: good   Daytime naps: 1-2    Past Medical History:   Diagnosis Date    Abnormal Pap smear of cervix     Aneurysm     CVA (cerebral infarction) 2012    Diabetes mellitus, type 2     Family history of breast cancer     Hypertension     Parathyroid disease     Stroke     Lt sided weakness       Past Surgical History:   Procedure Laterality Date     SECTION      x 4    COLONOSCOPY N/A 2016    Procedure: COLONOSCOPY;  Surgeon: Gio Bettencourt MD;  Location: [address]);  Service: Endoscopy;  Laterality: N/A;    COLONOSCOPY N/A 2017    Procedure: COLONOSCOPY;  Surgeon: Gio Bettencourt MD;  Location: [address]);  Service: Endoscopy;  Laterality: N/A;    TUBAL LIGATION         Family History   Problem Relation Age of Onset    Diabetes type II Mother     Kidney disease Mother         on HD due to diabetes    Hypertension Mother     Diabetes Mother     Breast cancer Father 83    Breast cancer Paternal Uncle     Breast cancer Paternal Aunt 17    Lung cancer Maternal Aunt  " "   Diabetes Maternal Aunt     Diabetes Brother     Diabetes Maternal Uncle     Diabetes Maternal Aunt     Diabetes Maternal Aunt     Diabetes Maternal Aunt     Diabetes Maternal Uncle     Diabetes Maternal Uncle     Diabetes Maternal Uncle     Breast cancer Paternal Grandmother        Social History   Substance Use Topics    Smoking status: Never Smoker    Smokeless tobacco: Never Used    Alcohol use Yes       ALLERGIES: Reviewed in EPIC    CURRENT MEDICATIONS: Reviewed in EPIC    REVIEW OF SYSTEMS:  Sleep related symptoms as per HPI;    Denies weight gain;    Denies sinus problems;    Denies dyspnea;    Denies palpitations;    Denies acid reflux;    Denies polyuria;    Denies headaches;    Sometimes mood disturbance;    Denies anemia;    Otherwise, a balance of systems reviewed is negative.    PHYSICAL EXAM:  /82 (BP Location: Left arm, Patient Position: Sitting, BP Method: Medium (Manual))   Pulse 90   Ht 5' 5" (1.651 m)   Wt 91.5 kg (201 lb 11.5 oz)   BMI 33.57 kg/m²   GENERAL: Well groomed; Normally developed; Overweight  HEENT: Conjunctivae are non-erythematous; Pupils equal, round, and reactive to light;    Nose is symmetrical; Nasal mucosa is pink and moist; Septum is midline;    Turbinates are normal; Nasal airflow is normal;    Posterior pharynx is pink; Posterior palate is low; Modified Mallampati: IV   Uvula is normal; Tongue is normal; Tonsils +1;    Dentition is fair; No TMJ tenderness;    Jaw opening and protrusion without click and without discomfort.  NECK: Supple. No thyromegaly. No palpable nodes. Neck circumference in inches is 15  SKIN: On face and neck: No abrasions, no rashes, no lesions.     No subcutaneous nodules are palpable.  RESPIRATORY: Chest is clear to auscultation.     Normal chest expansion and non-labored breathing at rest.  CARDIOVASCULAR: Normal S1, S2.  No murmurs, gallops or rubs.   No carotid bruits bilaterally.  EXTREMITIES: No clubbing. No cyanosis. " Edema is absent.   NEURO: Oriented to time, place and person.    Normal attention span and concentration.  Station normal. Gait left hemiparetic.  PSYCH: Affect is full. Mood is normal.      ASSESSMENT:    1. Sleep Apnea, unspecified. The patient symptomatically has snoring and excessive daytime sleepiness with exam findings of a crowded oral airway with medical co-morbidities of hypertension, prior stroke, and obesity. This warrants further investigation for possible obstructive sleep apnea.         PLAN:    Diagnostic: Home sleep study. The nature of this procedure and its indication was discussed with the patient.    Education: During our discussion today, we talked about the etiology of obstructive sleep apnea as well as the potential ramifications of untreated sleep apnea, which could include daytime sleepiness, hypertension, heart disease and/or stroke.     Precautions: The patient was advised to abstain from driving should they feel sleepy or drowsy.    Follow up: I will see the patient back after the sleep study has been completed. At this time, we can review the data and discuss potential treatment options. MD/NP

## 2018-06-04 ENCOUNTER — PROCEDURE VISIT (OUTPATIENT)
Dept: HEPATOLOGY | Facility: CLINIC | Age: 53
End: 2018-06-04
Attending: PHYSICIAN ASSISTANT
Payer: MEDICARE

## 2018-06-04 ENCOUNTER — OFFICE VISIT (OUTPATIENT)
Dept: HEPATOLOGY | Facility: CLINIC | Age: 53
End: 2018-06-04
Payer: MEDICARE

## 2018-06-04 VITALS
DIASTOLIC BLOOD PRESSURE: 84 MMHG | TEMPERATURE: 98 F | BODY MASS INDEX: 33.98 KG/M2 | RESPIRATION RATE: 18 BRPM | OXYGEN SATURATION: 98 % | SYSTOLIC BLOOD PRESSURE: 142 MMHG | HEART RATE: 76 BPM | HEIGHT: 65 IN | WEIGHT: 203.94 LBS

## 2018-06-04 DIAGNOSIS — K76.0 NAFLD (NONALCOHOLIC FATTY LIVER DISEASE): ICD-10-CM

## 2018-06-04 DIAGNOSIS — R74.8 ELEVATED LIVER ENZYMES: Primary | ICD-10-CM

## 2018-06-04 DIAGNOSIS — R74.8 ELEVATED LIVER ENZYMES: ICD-10-CM

## 2018-06-04 PROCEDURE — 99214 OFFICE O/P EST MOD 30 MIN: CPT | Mod: S$GLB,,, | Performed by: PHYSICIAN ASSISTANT

## 2018-06-04 PROCEDURE — 3077F SYST BP >= 140 MM HG: CPT | Mod: CPTII,S$GLB,, | Performed by: PHYSICIAN ASSISTANT

## 2018-06-04 PROCEDURE — 99999 PR PBB SHADOW E&M-EST. PATIENT-LVL III: CPT | Mod: PBBFAC,,, | Performed by: PHYSICIAN ASSISTANT

## 2018-06-04 PROCEDURE — 3079F DIAST BP 80-89 MM HG: CPT | Mod: CPTII,S$GLB,, | Performed by: PHYSICIAN ASSISTANT

## 2018-06-04 PROCEDURE — 3008F BODY MASS INDEX DOCD: CPT | Mod: CPTII,S$GLB,, | Performed by: PHYSICIAN ASSISTANT

## 2018-06-04 PROCEDURE — 91200 LIVER ELASTOGRAPHY: CPT | Mod: S$GLB,,, | Performed by: PHYSICIAN ASSISTANT

## 2018-06-04 NOTE — PROGRESS NOTES
"HEPATOLOGY CLINIC VISIT NOTE     REFERRING PROVIDER: Dr. Jeanette Delacruz    REASON FOR VISIT: elevated liver enzymes     HISTORY: This is a 52 y.o. Black or  female here for follow up. She was initially seen by me for elevated transaminases, referred by her PCP.      Her AST is 41, ALT 61.  and alk phos 151. She has normal synthetic liver function. Her PLTs are preserved. She had an U/S 2017 noting hepatomegaly and cholelithiasis. Her enzymes have been mildly elevated intermittently since . She denies previous h/o liver disease. She denies drug and alcohol use. She has had a negative acute hep panel in the past. Pt underwent a serological work up that was negative for AIH, viral hepatitis, A1AT, Nick's HH, ETOH use. Her alk phos is mildly elevated. AMA was negative. Mildly elevated alk phos can be seen in the setting of hepatic steatosis.     She does have several risk factors for NAFLD/PRADO, BMI of 34, HTN, HLD, h/o DMII- currently diet controlled. Pt underwent fibroscan prior to visit noting no fibrosis, but severe steatosis at S3 which is >67%. We discuss diet and exercise. She states that she doesn't feel like she can change her diet anymore because she "eat's healthy" and does not lose any weight. Upon further discussion, it would appear that she eats a diet that is very carbohydrate heavy and only eats one meal per day and likely overeats in that one meal. We discuss smaller more frequent meals and incorporating protein into meals.     Liver staging:  Fibroscan F0-F1   AST 36, ALT 59  Tbili WNL  Albumin WNL  PLTs >150     Past Medical History:   Diagnosis Date    Abnormal Pap smear of cervix     Aneurysm     CVA (cerebral infarction) 2012    Diabetes mellitus, type 2     Family history of breast cancer     Hypertension     Parathyroid disease     Stroke     Lt sided weakness     Past Surgical History:   Procedure Laterality Date     SECTION      x 4    " COLONOSCOPY N/A 2/18/2016    Procedure: COLONOSCOPY;  Surgeon: Gio Bettencourt MD;  [facility]  Service: Endoscopy;  Laterality: N/A;    COLONOSCOPY N/A 1/25/2017    Procedure: COLONOSCOPY;  Surgeon: Gio Bettencourt MD;  [facility]  Service: Endoscopy;  Laterality: N/A;    TUBAL LIGATION       FAMILY HISTORY: Negative for liver disease    SOCIAL HISTORY:   History   Smoking Status    Never Smoker   Smokeless Tobacco    Never Used       History   Alcohol Use    Yes       History   Drug Use No     ROS:   No fever, chills  No chest pain, dyspnea, cough  No abdominal pain, nausea, vomiting  No skin rashes   No headaches, visual changes  No lower extremity edema  No depression or anxiety      PHYSICAL EXAM:  Friendly Black or  female, in no acute distress; alert and oriented to person, place and time  VITALS: reviewed  HEENT: Sclerae anicteric.   NECK: Supple  LUNGS: Normal respiratory effort  ABDOMEN: Flat, soft, nontender  SKIN: Warm and dry. No jaundice, No obvious rashes.   EXTREMITIES: No lower extremity edema  NEURO/PSYCH: Normal gate. Memory intact. Thought and speech pattern appropriate. Behavior normal. No depression or anxiety noted.    RECENT LABS:  Lab Results   Component Value Date    WBC 6.30 01/17/2018    HGB 13.9 01/17/2018     01/17/2018     Lab Results   Component Value Date    INR 1.0 03/05/2018     Lab Results   Component Value Date    AST 41 (H) 03/05/2018    ALT 61 (H) 03/05/2018    BILITOT 0.3 03/05/2018    ALBUMIN 3.8 03/05/2018    ALKPHOS 151 (H) 03/05/2018    CREATININE 1.1 03/05/2018    BUN 16 03/05/2018     03/05/2018    K 4.3 03/05/2018     RECENT IMAGING:  U/S abdomen 06/2017  Narrative     HISTORY:  Elevated LFTs.     TECHNIQUE: Limited abdominal ultrasound     COMPARISON: None.    FINDINGS:    Pancreas (visualized portion): Normal    Liver: Mild enlarged, measuring 17.6 mm exiting below the caudal margin, with  homogeneous echotexture.  No focal lesions.      Gallbladder: Multiple mobile stones measuring up to 1.9 cm  No wall thickening.  No sonographic [place] sign.       Bile ducts: Common bile duct is not dilated, 3 mm.  No dilated intrahepatic radicles.     Aorta (visualized portion): No aneurysm.    Right Kidney: 10.9 cm. No focal abnormalities. No pelvocaliectasis.    General: No ascites.   Impression       Cholelithiasis without cholecystitis.    Mild hepatomegaly.     ASSESSMENT  52 y.o. Black or  female with:  1. ELEVATED LIVER ENZYMES/NAFLD  -- Fibroscan F0-F1, consistent with NAFLD  -- serological work up negative; suspect alk phos elevated due to NAFLD, AMA negative   -- Weight loss goal of 15-20 lbs  -- Low fat, low cholesterol, low carb, high fiber diet  --  Exercise, 5 days a week, 30 min a day  --  Recommend good control of cholesterol, blood pressure, blood sugar levels  -- (-) immunity to HAV and HBV, twinrix ordered     EDUCATION:  We discussed the manifestations of NAFLD. At this time there is no FDA approved therapy for NAFLD.   The patient and I discussed the importance of diet, exercise, and weight loss for management of NAFLD. We discussed a low fat, low carb/low sugar, high fiber diet, and a goal of 30 minutes of exercise 5 times per week.   Pt is aware that fatty liver can progress to steatohepatitis and possibly to cirrhosis, putting one at increased risk for liver cancer, liver failure, and death.        PLAN:  1. Encouraged continued weight loss  2. F/u visit in 1 year    Thank you for allowing me to participate in the care of Caitlyn Llanos PA-C

## 2018-06-05 NOTE — PROCEDURES
Procedures   Fibroscan Procedure     Name: Caitlyn Perez  Date of Procedure : 2018   :: Justus Llanos PA-C  Diagnosis: NAFLD    Probe: XL    Fibroscan readin.9 KPa    Fibrosis:F 0-1     CAP reading: 320 dB/m    Steatosis: :S3

## 2018-08-09 ENCOUNTER — TELEPHONE (OUTPATIENT)
Dept: INTERNAL MEDICINE | Facility: CLINIC | Age: 53
End: 2018-08-09

## 2018-08-09 NOTE — TELEPHONE ENCOUNTER
Pt called in about RTA forms being singed by PCP   Forms has been placed in PCP in [facility]

## 2018-08-10 DIAGNOSIS — E11.9 TYPE 2 DIABETES MELLITUS WITHOUT COMPLICATION: ICD-10-CM

## 2018-08-13 NOTE — TELEPHONE ENCOUNTER
Form completed. Pt does not have disability that would require special bus service per chart review.

## 2018-08-16 ENCOUNTER — TELEPHONE (OUTPATIENT)
Dept: HEPATOLOGY | Facility: CLINIC | Age: 53
End: 2018-08-16

## 2018-08-16 RX ORDER — IRBESARTAN 150 MG/1
150 TABLET ORAL NIGHTLY
Qty: 90 TABLET | Refills: 3 | Status: SHIPPED | OUTPATIENT
Start: 2018-08-16 | End: 2019-04-02 | Stop reason: SDUPTHER

## 2018-08-16 NOTE — TELEPHONE ENCOUNTER
Call received stating that Valsartan is not available at their pharmacy and requesting an alternative medication to be prescribed.     For patients taking Valsartan:  Patient currently taking Valsartan 160 mg. Based upon the recommended substitution chart below, Irbesartan 150 mg has been pended for MD approval.     Patient is instructed to monitor blood pressure at home after the medication is started and notify the provider for abnormalities.    Valsartan 40 mg = Irbesartan 75 mg  Valsartan 80 mg = Irbesartan 75 mg  Valsartan 160 mg = Irbesartan 150 mg  Valsartan 320 Mg = Irbesartan 300 mg

## 2018-08-16 NOTE — TELEPHONE ENCOUNTER
----- Message from Soyna Douglas, Luke sent at 8/15/2018  2:44 PM CDT -----  Regarding: twinrix  Good afternoon! Since being unable to reach the patient after four failed attempts since March, we will no longer make any more attempts to reach the patient. If the patient would like to get the vaccination in the future, please have them contact the pharmacy. Thank you!

## 2018-08-16 NOTE — TELEPHONE ENCOUNTER
Called pt and LVM stating:      Agnes, this is Ariana I've received your request I'm returning your call from Dr.Blessey fitch at [facility]. I just wanted to let you know that and RX for Irbesartan 150 mg has been sent to your pharmacy in replace of your Valsartan.

## 2018-08-16 NOTE — TELEPHONE ENCOUNTER
Spoke w/ pt and confirmed that RTA forms has been complete d  Pt verbally understands and agree w/ having forms mailed to address on chart   Pt has no further question

## 2019-01-24 ENCOUNTER — TELEPHONE (OUTPATIENT)
Dept: INTERNAL MEDICINE | Facility: CLINIC | Age: 54
End: 2019-01-24

## 2019-01-24 NOTE — TELEPHONE ENCOUNTER
Called pt and LVM stating that we were returning her call .  Left office number for pt to return office call

## 2019-01-24 NOTE — TELEPHONE ENCOUNTER
----- Message from Poly Bauer sent at 1/24/2019  9:57 AM CST -----  Contact: Pt   Name of Who is Calling: ANDRES SHORT [[id number]]      What is the request in detail: Patient is requesting a call from staff, no further information provided on the reason of the call. Please contact to further discuss and advise      Can the clinic reply by [facility]: No       What Number to Call Back if not in [facility]: [phone]

## 2019-01-29 ENCOUNTER — TELEPHONE (OUTPATIENT)
Dept: INTERNAL MEDICINE | Facility: CLINIC | Age: 54
End: 2019-01-29

## 2019-01-29 DIAGNOSIS — Z12.39 SCREENING FOR BREAST CANCER: Primary | ICD-10-CM

## 2019-01-29 NOTE — TELEPHONE ENCOUNTER
----- Message from Ynes Pena sent at 1/29/2019  3:44 PM CST -----  Contact: pt   Name of Who is Calling: ANDRES SHORT [[id number]]    What is the request in detail:Patient is requesting mmg orders and coloscopy....Please contact to further discuss and advise      Can the clinic reply by [facility]:     What Number to Call Back if not in [facility]: [phone].

## 2019-01-29 NOTE — TELEPHONE ENCOUNTER
mmg ordered     Per review of chart, last cscope report rec repeat due in 6 years which would be 2023 - will discuss this at upcoming appt with me

## 2019-01-30 NOTE — TELEPHONE ENCOUNTER
Sent message informing pt that her mmg had been ordered and I need day and time to schedule and cscope not due and it will be discussed at up coming bessy

## 2019-02-08 ENCOUNTER — TELEPHONE (OUTPATIENT)
Dept: INTERNAL MEDICINE | Facility: CLINIC | Age: 54
End: 2019-02-08

## 2019-02-08 ENCOUNTER — HOSPITAL ENCOUNTER (OUTPATIENT)
Dept: RADIOLOGY | Facility: OTHER | Age: 54
Discharge: HOME | End: 2019-02-08
Attending: INTERNAL MEDICINE
Payer: MEDICARE

## 2019-02-08 VITALS — HEIGHT: 65 IN | WEIGHT: 203 LBS | BODY MASS INDEX: 33.82 KG/M2

## 2019-02-08 DIAGNOSIS — I10 ESSENTIAL HYPERTENSION: Primary | ICD-10-CM

## 2019-02-08 DIAGNOSIS — E21.3 HYPERPARATHYROIDISM: ICD-10-CM

## 2019-02-08 DIAGNOSIS — Z12.39 SCREENING FOR BREAST CANCER: ICD-10-CM

## 2019-02-08 DIAGNOSIS — E78.2 MIXED HYPERLIPIDEMIA: ICD-10-CM

## 2019-02-08 PROCEDURE — 77067 SCR MAMMO BI INCL CAD: CPT | Mod: TC

## 2019-02-08 PROCEDURE — 77067 MAMMO DIGITAL SCREENING BILAT WITH TOMOSYNTHESIS_CAD: ICD-10-PCS | Mod: 26,,, | Performed by: RADIOLOGY

## 2019-02-08 PROCEDURE — 77063 MAMMO DIGITAL SCREENING BILAT WITH TOMOSYNTHESIS_CAD: ICD-10-PCS | Mod: 26,,, | Performed by: RADIOLOGY

## 2019-02-08 PROCEDURE — 77067 SCR MAMMO BI INCL CAD: CPT | Mod: 26,,, | Performed by: RADIOLOGY

## 2019-02-08 PROCEDURE — 77063 BREAST TOMOSYNTHESIS BI: CPT | Mod: 26,,, | Performed by: RADIOLOGY

## 2019-02-08 NOTE — TELEPHONE ENCOUNTER
----- Message from Gabbie Sevilla LPN sent at 2/8/2019  2:03 PM CST -----  Please contact patient to schedule annual labs, lab orders have been placed. Thank you.

## 2019-02-11 ENCOUNTER — OFFICE VISIT (OUTPATIENT)
Dept: OBSTETRICS AND GYNECOLOGY | Facility: CLINIC | Age: 54
End: 2019-02-11
Attending: OBSTETRICS & GYNECOLOGY
Payer: MEDICARE

## 2019-02-11 VITALS
DIASTOLIC BLOOD PRESSURE: 80 MMHG | WEIGHT: 205 LBS | SYSTOLIC BLOOD PRESSURE: 140 MMHG | BODY MASS INDEX: 35 KG/M2 | HEIGHT: 64 IN

## 2019-02-11 DIAGNOSIS — Z91.89 AT HIGH RISK FOR BREAST CANCER: ICD-10-CM

## 2019-02-11 DIAGNOSIS — Z01.419 WELL FEMALE EXAM WITH ROUTINE GYNECOLOGICAL EXAM: Primary | ICD-10-CM

## 2019-02-11 DIAGNOSIS — R32 URINARY INCONTINENCE, UNSPECIFIED TYPE: ICD-10-CM

## 2019-02-11 DIAGNOSIS — Z87.42 HISTORY OF ABNORMAL CERVICAL PAP SMEAR: ICD-10-CM

## 2019-02-11 DIAGNOSIS — Z80.3 FAMILY HISTORY OF MALIGNANT NEOPLASM OF BREAST: ICD-10-CM

## 2019-02-11 DIAGNOSIS — N89.8 VAGINAL ODOR: ICD-10-CM

## 2019-02-11 PROCEDURE — 3077F SYST BP >= 140 MM HG: CPT | Mod: CPTII,S$GLB,, | Performed by: OBSTETRICS & GYNECOLOGY

## 2019-02-11 PROCEDURE — 3079F PR MOST RECENT DIASTOLIC BLOOD PRESSURE 80-89 MM HG: ICD-10-PCS | Mod: CPTII,S$GLB,, | Performed by: OBSTETRICS & GYNECOLOGY

## 2019-02-11 PROCEDURE — 88175 CYTOPATH C/V AUTO FLUID REDO: CPT

## 2019-02-11 PROCEDURE — 99999 PR PBB SHADOW E&M-EST. PATIENT-LVL III: ICD-10-PCS | Mod: PBBFAC,,, | Performed by: OBSTETRICS & GYNECOLOGY

## 2019-02-11 PROCEDURE — 99999 PR PBB SHADOW E&M-EST. PATIENT-LVL III: CPT | Mod: PBBFAC,,, | Performed by: OBSTETRICS & GYNECOLOGY

## 2019-02-11 PROCEDURE — G0101 PR CA SCREEN;PELVIC/BREAST EXAM: ICD-10-PCS | Mod: S$GLB,,, | Performed by: OBSTETRICS & GYNECOLOGY

## 2019-02-11 PROCEDURE — G0101 CA SCREEN;PELVIC/BREAST EXAM: HCPCS | Mod: S$GLB,,, | Performed by: OBSTETRICS & GYNECOLOGY

## 2019-02-11 PROCEDURE — 3079F DIAST BP 80-89 MM HG: CPT | Mod: CPTII,S$GLB,, | Performed by: OBSTETRICS & GYNECOLOGY

## 2019-02-11 PROCEDURE — 3077F PR MOST RECENT SYSTOLIC BLOOD PRESSURE >= 140 MM HG: ICD-10-PCS | Mod: CPTII,S$GLB,, | Performed by: OBSTETRICS & GYNECOLOGY

## 2019-02-11 NOTE — PROGRESS NOTES
SUBJECTIVE:   53 y.o. female   for routine gyn exam. No LMP recorded. Patient is postmenopausal..  C/o vaginal odor.  She reports urinary incontinence.  No PMB.  No HRT.  Requests PAP smear today due to h/o abnl PAP.         Past Medical History:   Diagnosis Date    Abnormal Pap smear of cervix     Aneurysm     CVA (cerebral infarction) 2012    Diabetes mellitus, type 2     Family history of breast cancer     Hypertension     Parathyroid disease     Stroke     Lt sided weakness     Past Surgical History:   Procedure Laterality Date     SECTION      x 4    COLONOSCOPY N/A 2017    [facility]formed [facility] at [facility])    COLONOSCOPY N/A 2016    [facility] at [facility])    COLONOSCOPY N/A 2014    [facility] by [facility] at [facility] ([facility])    TUBAL LIGATION       Social History     Socioeconomic History    Marital status:      Spouse name: Not on file    Number of children: 6    Years of education: 12    Highest education level: Not on file   Social Needs    Financial resource strain: Not on file    Food insecurity - worry: Not on file    Food insecurity - inability: Not on file    Transportation needs - medical: Not on file    Transportation needs - non-medical: Not on file   Occupational History    Occupation: disabled     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  15, 15, 17, yo and 27 live with her     Family History   Problem Relation Age of Onset    Diabetes type II Mother     Kidney disease Mother         on HD due to  diabetes    Hypertension Mother     Diabetes Mother     Breast cancer Father 83    Breast cancer Paternal Uncle     Breast cancer Paternal Aunt 17    Lung cancer Maternal Aunt     Diabetes Maternal Aunt     Diabetes Brother     Diabetes Maternal Uncle     Diabetes Maternal Aunt     Diabetes Maternal Aunt     Diabetes Maternal Aunt     Diabetes Maternal Uncle     Diabetes Maternal Uncle     Diabetes Maternal Uncle     Breast cancer Paternal Grandmother      OB History    Para Term  AB Living   5 5 5     6   SAB TAB Ectopic Multiple Live Births         1 6      # Outcome Date GA Lbr Dougie/2nd Weight Sex Delivery Anes PTL Lv   5 Term            4 Term            3 Term            2 Term            1 Term                     Current Outpatient Medications   Medication Sig Dispense Refill    amLODIPine (NORVASC) 10 MG tablet TAKE 1 TABLET(10 MG) BY MOUTH EVERY DAY 90 tablet 3    aspirin (ECOTRIN) 81 MG EC tablet Take 81 mg by mouth once daily.      CYANOCOBALAMIN, VITAMIN B-12, (VITAMIN B-12 ORAL) Take 1 tablet by mouth once daily.      irbesartan (AVAPRO) 150 MG tablet Take 1 tablet (150 mg total) by mouth every evening. 90 tablet 3    KRILL OIL ORAL Take 1 capsule by mouth once daily.      LUTEIN ORAL Take by mouth once daily.      MULTIVITAMIN W-MINERALS/LUTEIN (CENTRUM SILVER ORAL) Take 1 tablet by mouth once daily.      blood sugar diagnostic Strp True Test blood glucose test strips & lancets. Check blood sugars daily. 3 month supply. 100 each 3    boric acid (BORIC ACID) vaginal suppository Place 0.9231 each (600 mg total) vaginally every evening. for 14 days 14 suppository 1     No current facility-administered medications for this visit.      Allergies: Patient has no known allergies.     ROS:  Constitutional: no weight loss, weight gain, fever, fatigue  Eyes:  No vision changes, glasses/contacts  ENT/Mouth: No ulcers, sinus problems, ears ringing, headache  Cardiovascular: No  "inability to lie flat, chest pain, exercise intolerance, swelling, heart palpitations  Respiratory: No wheezing, coughing blood, shortness of breath, or cough  Gastrointestinal: No diarrhea, bloody stool, nausea/vomiting, constipation, gas, hemorrhoids  Genitourinary: No blood in urine, painful urination, urgency of urination, frequency of urination, incomplete emptying, +incontinence, abnormal bleeding, painful periods, heavy periods, vaginal discharge, +vaginal odor, painful intercourse, sexual problems, bleeding after intercourse.  Musculoskeletal: No muscle weakness  Skin/Breast: No painful breasts, nipple discharge, masses, rash, ulcers  Neurological: No passing out, seizures, numbness, headache  Endocrine: No diabetes, hypothyroid, hyperthyroid, hot flashes, hair loss, abnormal hair growth, ance  Psychiatric: No depression, crying  Hematologic: No bruises, bleeding, swollen lymph nodes, anemia.      OBJECTIVE:   The patient appears well, alert, oriented x 3, in no distress.  BP (!) 140/80 (BP Location: Right arm, Patient Position: Sitting, BP Method: Large (Manual))   Ht 5' 4" (1.626 m)   Wt 93 kg (205 lb 0.4 oz)   BMI 35.19 kg/m²   NECK: no thyromegaly, trachea midline  SKIN: no acne, striae, hirsutism  CHEST: CTAB  CV: RRR  BREAST EXAM: breasts appear normal, no suspicious masses, no skin or nipple changes or axillary nodes  ABDOMEN: no hernias, masses, or hepatosplenomegaly  GENITALIA: normal external genitalia, no erythema, no discharge  URETHRA: normal urethra, normal urethral meatus  VAGINA: Normal  CERVIX: no lesions or cervical motion tenderness  UTERUS: normal size, contour, position, consistency, mobility, non-tender  ADNEXA: no mass, fullness, tenderness      ASSESSMENT:   1. Well female exam with routine gynecological exam  Liquid-based pap smear, screening   2. History of abnormal cervical Pap smear     3. At high risk for breast cancer     4. Family history of malignant neoplasm of breast   "   5. Vaginal odor     6. Urinary incontinence, unspecified type         PLAN:   Orders Placed This Encounter    Liquid-based pap smear, screening     Discussed urinary incontinence, Kegels, consult urogyn  Discussed vaginal odor and Boric acid suppositories prn.  Had consult with Dr. Roa.  Return to clinic in 1 year

## 2019-02-18 ENCOUNTER — LAB VISIT (OUTPATIENT)
Dept: LAB | Facility: OTHER | Age: 54
End: 2019-02-18
Attending: INTERNAL MEDICINE
Payer: MEDICARE

## 2019-02-18 DIAGNOSIS — E21.3 HYPERPARATHYROIDISM: ICD-10-CM

## 2019-02-18 DIAGNOSIS — E11.9 TYPE 2 DIABETES MELLITUS WITHOUT COMPLICATION: ICD-10-CM

## 2019-02-18 DIAGNOSIS — E78.2 MIXED HYPERLIPIDEMIA: ICD-10-CM

## 2019-02-18 DIAGNOSIS — I10 ESSENTIAL HYPERTENSION: ICD-10-CM

## 2019-02-18 LAB
ALBUMIN SERPL BCP-MCNC: 4.1 G/DL
ALP SERPL-CCNC: 158 U/L
ALT SERPL W/O P-5'-P-CCNC: 55 U/L
ANION GAP SERPL CALC-SCNC: 5 MMOL/L
AST SERPL-CCNC: 39 U/L
BASOPHILS # BLD AUTO: 0.04 K/UL
BASOPHILS NFR BLD: 0.7 %
BILIRUB SERPL-MCNC: 0.5 MG/DL
BUN SERPL-MCNC: 16 MG/DL
CALCIUM SERPL-MCNC: 10.9 MG/DL
CHLORIDE SERPL-SCNC: 106 MMOL/L
CHOLEST SERPL-MCNC: 188 MG/DL
CHOLEST/HDLC SERPL: 3.2 {RATIO}
CO2 SERPL-SCNC: 30 MMOL/L
CREAT SERPL-MCNC: 0.9 MG/DL
DIFFERENTIAL METHOD: ABNORMAL
EOSINOPHIL # BLD AUTO: 0.1 K/UL
EOSINOPHIL NFR BLD: 2.4 %
ERYTHROCYTE [DISTWIDTH] IN BLOOD BY AUTOMATED COUNT: 13.6 %
EST. GFR  (AFRICAN AMERICAN): >60 ML/MIN/1.73 M^2
EST. GFR  (NON AFRICAN AMERICAN): >60 ML/MIN/1.73 M^2
ESTIMATED AVG GLUCOSE: 117 MG/DL
GLUCOSE SERPL-MCNC: 102 MG/DL
HBA1C MFR BLD HPLC: 5.7 %
HCT VFR BLD AUTO: 41.9 %
HDLC SERPL-MCNC: 59 MG/DL
HDLC SERPL: 31.4 %
HGB BLD-MCNC: 13.7 G/DL
LDLC SERPL CALC-MCNC: 117 MG/DL
LYMPHOCYTES # BLD AUTO: 1.6 K/UL
LYMPHOCYTES NFR BLD: 27.7 %
MCH RBC QN AUTO: 27.4 PG
MCHC RBC AUTO-ENTMCNC: 32.7 G/DL
MCV RBC AUTO: 84 FL
MONOCYTES # BLD AUTO: 0.4 K/UL
MONOCYTES NFR BLD: 5.9 %
NEUTROPHILS # BLD AUTO: 3.8 K/UL
NEUTROPHILS NFR BLD: 63.1 %
NONHDLC SERPL-MCNC: 129 MG/DL
PLATELET # BLD AUTO: 358 K/UL
PMV BLD AUTO: 10.1 FL
POTASSIUM SERPL-SCNC: 4 MMOL/L
PROT SERPL-MCNC: 7.6 G/DL
PTH-INTACT SERPL-MCNC: 168 PG/ML
RBC # BLD AUTO: 5 M/UL
SODIUM SERPL-SCNC: 141 MMOL/L
TRIGL SERPL-MCNC: 60 MG/DL
WBC # BLD AUTO: 5.93 K/UL

## 2019-02-18 PROCEDURE — 83970 ASSAY OF PARATHORMONE: CPT

## 2019-02-18 PROCEDURE — 36415 COLL VENOUS BLD VENIPUNCTURE: CPT

## 2019-02-18 PROCEDURE — 85025 COMPLETE CBC W/AUTO DIFF WBC: CPT

## 2019-02-18 PROCEDURE — 83036 HEMOGLOBIN GLYCOSYLATED A1C: CPT

## 2019-02-18 PROCEDURE — 80053 COMPREHEN METABOLIC PANEL: CPT

## 2019-02-18 PROCEDURE — 80061 LIPID PANEL: CPT

## 2019-02-22 ENCOUNTER — OFFICE VISIT (OUTPATIENT)
Dept: INTERNAL MEDICINE | Facility: CLINIC | Age: 54
End: 2019-02-22
Attending: INTERNAL MEDICINE
Payer: MEDICARE

## 2019-02-22 VITALS
DIASTOLIC BLOOD PRESSURE: 80 MMHG | HEIGHT: 64 IN | WEIGHT: 202.19 LBS | HEART RATE: 84 BPM | SYSTOLIC BLOOD PRESSURE: 126 MMHG | OXYGEN SATURATION: 99 % | BODY MASS INDEX: 34.52 KG/M2

## 2019-02-22 DIAGNOSIS — R40.0 DAYTIME SLEEPINESS: ICD-10-CM

## 2019-02-22 DIAGNOSIS — M85.80 OSTEOPENIA, UNSPECIFIED LOCATION: ICD-10-CM

## 2019-02-22 DIAGNOSIS — I10 ESSENTIAL HYPERTENSION: Primary | ICD-10-CM

## 2019-02-22 DIAGNOSIS — E21.0 PRIMARY HYPERPARATHYROIDISM: ICD-10-CM

## 2019-02-22 DIAGNOSIS — G81.10 SPASTIC HEMIPARESIS: ICD-10-CM

## 2019-02-22 DIAGNOSIS — K76.0 NAFLD (NONALCOHOLIC FATTY LIVER DISEASE): ICD-10-CM

## 2019-02-22 DIAGNOSIS — I69.30 HISTORY OF CVA WITH RESIDUAL DEFICIT: ICD-10-CM

## 2019-02-22 DIAGNOSIS — G62.9 PERIPHERAL POLYNEUROPATHY: ICD-10-CM

## 2019-02-22 DIAGNOSIS — E55.9 VITAMIN D DEFICIENCY: ICD-10-CM

## 2019-02-22 PROCEDURE — 90686 FLU VACCINE (QUAD) GREATER THAN OR EQUAL TO 3YO PRESERVATIVE FREE IM: ICD-10-PCS | Mod: S$GLB,,, | Performed by: INTERNAL MEDICINE

## 2019-02-22 PROCEDURE — 3008F BODY MASS INDEX DOCD: CPT | Mod: CPTII,S$GLB,, | Performed by: INTERNAL MEDICINE

## 2019-02-22 PROCEDURE — 3008F PR BODY MASS INDEX (BMI) DOCUMENTED: ICD-10-PCS | Mod: CPTII,S$GLB,, | Performed by: INTERNAL MEDICINE

## 2019-02-22 PROCEDURE — 3074F SYST BP LT 130 MM HG: CPT | Mod: CPTII,S$GLB,, | Performed by: INTERNAL MEDICINE

## 2019-02-22 PROCEDURE — 99999 PR PBB SHADOW E&M-EST. PATIENT-LVL V: CPT | Mod: PBBFAC,,, | Performed by: INTERNAL MEDICINE

## 2019-02-22 PROCEDURE — 99999 PR PBB SHADOW E&M-EST. PATIENT-LVL V: ICD-10-PCS | Mod: PBBFAC,,, | Performed by: INTERNAL MEDICINE

## 2019-02-22 PROCEDURE — 99499 RISK ADDL DX/OHS AUDIT: ICD-10-PCS | Mod: S$GLB,,, | Performed by: INTERNAL MEDICINE

## 2019-02-22 PROCEDURE — 99499 UNLISTED E&M SERVICE: CPT | Mod: S$GLB,,, | Performed by: INTERNAL MEDICINE

## 2019-02-22 PROCEDURE — 3074F PR MOST RECENT SYSTOLIC BLOOD PRESSURE < 130 MM HG: ICD-10-PCS | Mod: CPTII,S$GLB,, | Performed by: INTERNAL MEDICINE

## 2019-02-22 PROCEDURE — 99214 PR OFFICE/OUTPT VISIT, EST, LEVL IV, 30-39 MIN: ICD-10-PCS | Mod: 25,S$GLB,, | Performed by: INTERNAL MEDICINE

## 2019-02-22 PROCEDURE — 3079F PR MOST RECENT DIASTOLIC BLOOD PRESSURE 80-89 MM HG: ICD-10-PCS | Mod: CPTII,S$GLB,, | Performed by: INTERNAL MEDICINE

## 2019-02-22 PROCEDURE — 90686 IIV4 VACC NO PRSV 0.5 ML IM: CPT | Mod: S$GLB,,, | Performed by: INTERNAL MEDICINE

## 2019-02-22 PROCEDURE — 3079F DIAST BP 80-89 MM HG: CPT | Mod: CPTII,S$GLB,, | Performed by: INTERNAL MEDICINE

## 2019-02-22 PROCEDURE — G0008 FLU VACCINE (QUAD) GREATER THAN OR EQUAL TO 3YO PRESERVATIVE FREE IM: ICD-10-PCS | Mod: S$GLB,,, | Performed by: INTERNAL MEDICINE

## 2019-02-22 PROCEDURE — 99214 OFFICE O/P EST MOD 30 MIN: CPT | Mod: 25,S$GLB,, | Performed by: INTERNAL MEDICINE

## 2019-02-22 PROCEDURE — G0008 ADMIN INFLUENZA VIRUS VAC: HCPCS | Mod: S$GLB,,, | Performed by: INTERNAL MEDICINE

## 2019-02-22 RX ORDER — GABAPENTIN 100 MG/1
100 CAPSULE ORAL NIGHTLY
Qty: 30 CAPSULE | Refills: 1 | Status: SHIPPED | OUTPATIENT
Start: 2019-02-22 | End: 2020-01-24

## 2019-02-22 NOTE — PROGRESS NOTES
"Patient was given vaccine information sheet for the Flu Vaccine. The area of injection was palpated using the acromion process as a landmark. This area was cleaned with alcohol. Using a 25g 1" safety needle, 0.5mL of the vaccine was placed into the left deltoid muscle. The injection site was dressed with a bandage. Patient experienced no complications and was discharged in stable condition. Fluzone vaccine Lot: [id number] Exp: 06/30/2019    "

## 2019-02-22 NOTE — PROGRESS NOTES
"Subjective:   Patient ID: Caitlyn Perez is a 53 y.o. female  Chief complaint:   Chief Complaint   Patient presents with    Annual Exam       HPI     Pt here for annual exam   Pcp: Dr. Delacruz     Requesting [place] aplication to be completed for disability - daughter lives in [place] and pt has car there that she uses - has hx of CVA with left sided weakness and limited mobility 2/2 to this - form completed      HTN:   Not checking bp at home   - taking amlodipine 10mg, irbesartan 150mg daliy   - repeat bp improved   - agrees to dig htn prog    IFG: a1c 5.7%  Not exercising     Hx of CVA - ruptured cerebral aneurysm with PN and L UE and L LE weakness - given GPN for paraesthesias which makes her groggy - would like to try lower dose of this to see if effective without causes excessive grogginess the next AM   - has left sided UE and LE weakness and L UE spasticity   - rides bike but reports would like to resume trial of PT to help with strength  - Seen by PMR in past for spasticity - she would like to hold off on f/u for now and try PT again      NAFLD eval 2/2 elevated liver enzymes - seen by hep and dx with this in past - not on statin     IFG: a1c inc slightly - reviewed labs and need to lose wt/exercise    Hyperparathyroidism: seen by endocrine 1/2018 and due for f/u     Vit d def: taking 600u daily    Reports poor sleep - trouble staying asleep - will check on grandchild + child custody issue   - seen by sleep 5/2018  - napping during the day and agrees to reduce this     Osteopenia: taking vit d as above     Recently donated blood about 1 week ago just before labs drawn - discussed mild plt elevation     Review of Systems    Objective:  Vitals:    02/22/19 1118 02/22/19 1209   BP: 124/87 126/80   Pulse: 84    SpO2: 99%    Weight: 91.7 kg (202 lb 2.6 oz)    Height: 5' 4" (1.626 m)      Body mass index is 34.7 kg/m².    Physical Exam   Constitutional: She is oriented to person, place, and time. She " appears well-developed and well-nourished.   HENT:   Head: Normocephalic and atraumatic.   Right Ear: External ear normal.   Left Ear: External ear normal.   Nose: Nose normal.   Mouth/Throat: Oropharynx is clear and moist. No oropharyngeal exudate.   No carotid bruits   Eyes: Conjunctivae and EOM are normal.   Neck: Neck supple. No thyromegaly present.   Cardiovascular: Normal rate, regular rhythm and intact distal pulses.   Pulmonary/Chest: Effort normal and breath sounds normal.   Abdominal: Soft. Bowel sounds are normal.   Musculoskeletal: She exhibits no edema or tenderness.   LUE weakness   Lymphadenopathy:     She has no cervical adenopathy.   Neurological: She is alert and oriented to person, place, and time.   Skin: Skin is warm and dry.   Psychiatric: Her behavior is normal. Thought content normal.   Vitals reviewed.    Assessment:  1. Essential hypertension    2. History of CVA with residual deficit    3. NAFLD (nonalcoholic fatty liver disease)    4. Osteopenia, unspecified location    5. Spastic hemiparesis    6. Vitamin D deficiency    7. Daytime sleepiness    8. Primary hyperparathyroidism    9. Peripheral polyneuropathy        Plan:  Caitlyn was seen today for annual exam.    Diagnoses and all orders for this visit:    Essential hypertension  Stable, cont meds   Agrees to dig htn program     History of CVA with residual deficit  -     Ambulatory Referral to Physical/Occupational Therapy    NAFLD (nonalcoholic fatty liver disease)  Stable - rec weight loss, exercise, diet modification     Osteopenia, unspecified location  Cont vit d    Spastic hemiparesis  -     Ambulatory Referral to Physical/Occupational Therapy    Vitamin D deficiency  -     Vitamin D; Future  Cont supp, check level     Daytime sleepiness  -     Ambulatory consult to Sleep Disorders    Primary hyperparathyroidism  -     Ambulatory Referral to Endocrinology    Other orders  -     Hypertension Digital Medicine (HDMP) Enrollment  Order  -     Hypertension Digital Medicine (HDMP): Assign Onboarding Questionnaires  -     Influenza - Quadrivalent (3 years & older) (PF)  -     gabapentin (NEURONTIN) 100 MG capsule; Take 1 capsule (100 mg total) by mouth every evening.    PN: trial of gpn 100mg nightly to see if roopa lower dose than 300mg    Health Maintenance   Topic Date Due    Pneumococcal Vaccine (Medium Risk) (1 of 1 - PPSV23) 07/12/1983    High Dose Statin  07/12/1985    Influenza Vaccine  08/01/2018    Colonoscopy  01/25/2020    Mammogram  02/08/2020    Lipid Panel  02/18/2020    Pap Smear with HPV Cotest  02/11/2022    TETANUS VACCINE  12/27/2026    Hepatitis C Screening  Completed     neeeds to schedule f/u with sleep for HSS and endocrine

## 2019-02-25 ENCOUNTER — TELEPHONE (OUTPATIENT)
Dept: INTERNAL MEDICINE | Facility: CLINIC | Age: 54
End: 2019-02-25

## 2019-03-20 ENCOUNTER — TELEPHONE (OUTPATIENT)
Dept: SURGERY | Facility: CLINIC | Age: 54
End: 2019-03-20

## 2019-03-20 NOTE — TELEPHONE ENCOUNTER
Called pt to schedule her to see an Endocrinologist for Primary Hyperparathyroidism. No answer. Left a message.

## 2019-03-28 ENCOUNTER — HOSPITAL ENCOUNTER (EMERGENCY)
Facility: OTHER | Age: 54
Discharge: HOME | End: 2019-03-29
Attending: EMERGENCY MEDICINE
Payer: MEDICARE

## 2019-03-28 DIAGNOSIS — R10.32 BILATERAL LOWER ABDOMINAL DISCOMFORT: ICD-10-CM

## 2019-03-28 DIAGNOSIS — R10.31 BILATERAL LOWER ABDOMINAL DISCOMFORT: ICD-10-CM

## 2019-03-28 DIAGNOSIS — N30.90 CYSTITIS: Primary | ICD-10-CM

## 2019-03-28 LAB
ANION GAP SERPL CALC-SCNC: 7 MMOL/L (ref 8–16)
BASOPHILS # BLD AUTO: 0.01 K/UL (ref 0–0.2)
BASOPHILS NFR BLD: 0.1 % (ref 0–1.9)
BUN SERPL-MCNC: 16 MG/DL (ref 6–20)
CALCIUM SERPL-MCNC: 10.3 MG/DL (ref 8.7–10.5)
CHLORIDE SERPL-SCNC: 107 MMOL/L (ref 95–110)
CO2 SERPL-SCNC: 25 MMOL/L (ref 23–29)
CREAT SERPL-MCNC: 1.1 MG/DL (ref 0.5–1.4)
DIFFERENTIAL METHOD: ABNORMAL
EOSINOPHIL # BLD AUTO: 0 K/UL (ref 0–0.5)
EOSINOPHIL NFR BLD: 0.1 % (ref 0–8)
ERYTHROCYTE [DISTWIDTH] IN BLOOD BY AUTOMATED COUNT: 13.6 % (ref 11.5–14.5)
EST. GFR  (AFRICAN AMERICAN): >60 ML/MIN/1.73 M^2
EST. GFR  (NON AFRICAN AMERICAN): 57 ML/MIN/1.73 M^2
GLUCOSE SERPL-MCNC: 130 MG/DL (ref 70–110)
HCT VFR BLD AUTO: 36.5 % (ref 37–48.5)
HGB BLD-MCNC: 12 G/DL (ref 12–16)
LYMPHOCYTES # BLD AUTO: 2 K/UL (ref 1–4.8)
LYMPHOCYTES NFR BLD: 12.6 % (ref 18–48)
MCH RBC QN AUTO: 27.3 PG (ref 27–31)
MCHC RBC AUTO-ENTMCNC: 32.9 G/DL (ref 32–36)
MCV RBC AUTO: 83 FL (ref 82–98)
MONOCYTES # BLD AUTO: 0.8 K/UL (ref 0.3–1)
MONOCYTES NFR BLD: 5 % (ref 4–15)
NEUTROPHILS # BLD AUTO: 13 K/UL (ref 1.8–7.7)
NEUTROPHILS NFR BLD: 82.1 % (ref 38–73)
PLATELET # BLD AUTO: 356 K/UL (ref 150–350)
PMV BLD AUTO: 9.9 FL (ref 9.2–12.9)
POTASSIUM SERPL-SCNC: 4.1 MMOL/L (ref 3.5–5.1)
RBC # BLD AUTO: 4.39 M/UL (ref 4–5.4)
SODIUM SERPL-SCNC: 139 MMOL/L (ref 136–145)
WBC # BLD AUTO: 15.85 K/UL (ref 3.9–12.7)

## 2019-03-28 PROCEDURE — 80048 BASIC METABOLIC PNL TOTAL CA: CPT

## 2019-03-28 PROCEDURE — 85025 COMPLETE CBC W/AUTO DIFF WBC: CPT

## 2019-03-28 PROCEDURE — 63600175 PHARM REV CODE 636 W HCPCS: Performed by: PHYSICIAN ASSISTANT

## 2019-03-28 PROCEDURE — 36415 COLL VENOUS BLD VENIPUNCTURE: CPT

## 2019-03-28 PROCEDURE — 99284 EMERGENCY DEPT VISIT MOD MDM: CPT | Mod: 25

## 2019-03-28 PROCEDURE — 96361 HYDRATE IV INFUSION ADD-ON: CPT

## 2019-03-28 PROCEDURE — 25000003 PHARM REV CODE 250: Performed by: PHYSICIAN ASSISTANT

## 2019-03-28 PROCEDURE — 96375 TX/PRO/DX INJ NEW DRUG ADDON: CPT

## 2019-03-28 RX ORDER — KETOROLAC TROMETHAMINE 30 MG/ML
15 INJECTION, SOLUTION INTRAMUSCULAR; INTRAVENOUS
Status: COMPLETED | OUTPATIENT
Start: 2019-03-28 | End: 2019-03-28

## 2019-03-28 RX ORDER — ONDANSETRON 2 MG/ML
4 INJECTION INTRAMUSCULAR; INTRAVENOUS
Status: COMPLETED | OUTPATIENT
Start: 2019-03-28 | End: 2019-03-28

## 2019-03-28 RX ADMIN — ONDANSETRON 4 MG: 2 INJECTION INTRAMUSCULAR; INTRAVENOUS at 11:03

## 2019-03-28 RX ADMIN — IOHEXOL 100 ML: 350 INJECTION, SOLUTION INTRAVENOUS at 11:03

## 2019-03-28 RX ADMIN — SODIUM CHLORIDE 1000 ML: 0.9 INJECTION, SOLUTION INTRAVENOUS at 10:03

## 2019-03-28 RX ADMIN — KETOROLAC TROMETHAMINE 15 MG: 30 INJECTION INTRAMUSCULAR; INTRAVENOUS at 11:03

## 2019-03-29 VITALS
HEART RATE: 98 BPM | DIASTOLIC BLOOD PRESSURE: 65 MMHG | HEIGHT: 64 IN | RESPIRATION RATE: 18 BRPM | BODY MASS INDEX: 35.85 KG/M2 | OXYGEN SATURATION: 97 % | TEMPERATURE: 99 F | WEIGHT: 210 LBS | SYSTOLIC BLOOD PRESSURE: 108 MMHG

## 2019-03-29 LAB
BACTERIA #/AREA URNS HPF: ABNORMAL /HPF
BILIRUB UR QL STRIP: NEGATIVE
CLARITY UR: ABNORMAL
COLOR UR: YELLOW
GLUCOSE UR QL STRIP: NEGATIVE
HGB UR QL STRIP: ABNORMAL
HYALINE CASTS #/AREA URNS LPF: 0 /LPF
KETONES UR QL STRIP: NEGATIVE
LEUKOCYTE ESTERASE UR QL STRIP: ABNORMAL
MICROSCOPIC COMMENT: ABNORMAL
NITRITE UR QL STRIP: POSITIVE
PH UR STRIP: 7 [PH] (ref 5–8)
PROT UR QL STRIP: ABNORMAL
RBC #/AREA URNS HPF: 10 /HPF (ref 0–4)
SP GR UR STRIP: 1.01 (ref 1–1.03)
SQUAMOUS #/AREA URNS HPF: 4 /HPF
URN SPEC COLLECT METH UR: ABNORMAL
UROBILINOGEN UR STRIP-ACNC: ABNORMAL EU/DL
WBC #/AREA URNS HPF: >100 /HPF (ref 0–5)
WBC CLUMPS URNS QL MICRO: ABNORMAL
YEAST URNS QL MICRO: ABNORMAL

## 2019-03-29 PROCEDURE — 87186 SC STD MICRODIL/AGAR DIL: CPT

## 2019-03-29 PROCEDURE — 87088 URINE BACTERIA CULTURE: CPT

## 2019-03-29 PROCEDURE — 96365 THER/PROPH/DIAG IV INF INIT: CPT

## 2019-03-29 PROCEDURE — 96361 HYDRATE IV INFUSION ADD-ON: CPT

## 2019-03-29 PROCEDURE — 87077 CULTURE AEROBIC IDENTIFY: CPT

## 2019-03-29 PROCEDURE — 25500020 PHARM REV CODE 255: Performed by: EMERGENCY MEDICINE

## 2019-03-29 PROCEDURE — 63600175 PHARM REV CODE 636 W HCPCS: Performed by: PHYSICIAN ASSISTANT

## 2019-03-29 PROCEDURE — 25000003 PHARM REV CODE 250: Performed by: PHYSICIAN ASSISTANT

## 2019-03-29 PROCEDURE — 81000 URINALYSIS NONAUTO W/SCOPE: CPT

## 2019-03-29 PROCEDURE — 87086 URINE CULTURE/COLONY COUNT: CPT

## 2019-03-29 RX ORDER — CEPHALEXIN 500 MG/1
500 CAPSULE ORAL EVERY 12 HOURS
Qty: 14 CAPSULE | Refills: 0 | Status: SHIPPED | OUTPATIENT
Start: 2019-03-29 | End: 2019-04-05

## 2019-03-29 RX ORDER — HYOSCYAMINE SULFATE 0.5 MG/ML
0.5 INJECTION, SOLUTION SUBCUTANEOUS
Status: COMPLETED | OUTPATIENT
Start: 2019-03-29 | End: 2019-03-29

## 2019-03-29 RX ORDER — ONDANSETRON 4 MG/1
4 TABLET, FILM COATED ORAL EVERY 6 HOURS PRN
Qty: 12 TABLET | Refills: 0 | Status: SHIPPED | OUTPATIENT
Start: 2019-03-29

## 2019-03-29 RX ADMIN — SODIUM CHLORIDE 1000 ML: 0.9 INJECTION, SOLUTION INTRAVENOUS at 01:03

## 2019-03-29 RX ADMIN — CEFTRIAXONE 1 G: 1 INJECTION, SOLUTION INTRAVENOUS at 01:03

## 2019-03-29 RX ADMIN — HYOSCYAMINE SULFATE 0.5 MG: 0.5 INJECTION, SOLUTION SUBCUTANEOUS at 12:03

## 2019-03-29 NOTE — ED PROVIDER NOTES
Encounter Date: 3/28/2019       History     Chief Complaint   Patient presents with    Abdominal Pain     c/o RLQ abd pain x1 day pta with nausea, LBM earlier today. denies urinary symptoms      Patient is a 53-year-old female with history of CVA (with residual left-sided weakness), diabetes, hypertension, and parathyroid disease who presents to the ED with abdominal pain. Patient reports onset of right lower quadrant abdominal pain at approximately 11:00 a.m. this morning.  She states she took Motrin and a muscle relaxer and then went to sleep.  She states when she woke up few hours ago she was still experiencing the pain. She states she attempted to eating this morning but does not have an appetite.  She also reports nausea but denies emesis.  She reports chills. She reports urinary odor.  She denies chest pain or shortness of breath but states when she takes a deep breath in she has pain in her lower abdomen.    The history is provided by the patient.     Review of patient's allergies indicates:  No Known Allergies  Past Medical History:   Diagnosis Date    Abnormal Pap smear of cervix     Aneurysm     CVA (cerebral infarction) 2012    Diabetes mellitus, type 2     Family history of breast cancer     Hypertension     Parathyroid disease     Stroke     Lt sided weakness     Past Surgical History:   Procedure Laterality Date     SECTION      x 4    COLONOSCOPY N/A 2017    [facility] ([facility])    COLONOSCOPY N/A 2016    [facility] (4TH FLR)    COLONOSCOPY N/A 2014    [facility] (4TH FLR)    TUBAL LIGATION       Family History   Problem Relation Age of Onset    Diabetes type II Mother     Kidney disease Mother         on HD due to diabetes    Hypertension Mother     Diabetes Mother     Breast cancer Father 83    Breast cancer Paternal Uncle     Breast cancer  Paternal Aunt 17    Lung cancer Maternal Aunt     Diabetes Maternal Aunt     Diabetes Brother     Diabetes Maternal Uncle     Diabetes Maternal Aunt     Diabetes Maternal Aunt     Diabetes Maternal Aunt     Diabetes Maternal Uncle     Diabetes Maternal Uncle     Diabetes Maternal Uncle     Breast cancer Paternal Grandmother      Social History     Tobacco Use    Smoking status: Never Smoker    Smokeless tobacco: Never Used   Substance Use Topics    Alcohol use: Yes    Drug use: No     Review of Systems   Constitutional: Positive for appetite change and chills.   HENT: Negative for congestion and sore throat.    Eyes: Negative for pain.   Respiratory: Negative for shortness of breath.    Cardiovascular: Negative for chest pain.   Gastrointestinal: Positive for abdominal pain and nausea. Negative for diarrhea and vomiting.   Genitourinary: Negative for dysuria.   Musculoskeletal: Negative for arthralgias.   Skin: Negative for rash.   Neurological: Negative for headaches.       Physical Exam     Initial Vitals [03/28/19 2136]   BP Pulse Resp Temp SpO2   126/77 (!) 120 20 99.6 °F (37.6 °C) 97 %      MAP       --         Physical Exam    Constitutional: Vital signs are normal. She is Obese . She is cooperative.   HENT:   Head: Normocephalic and atraumatic.   Eyes: EOM are normal. Pupils are equal, round, and reactive to light.   Neck: Normal range of motion. Neck supple.   Cardiovascular: Regular rhythm and intact distal pulses.   Tachycardic   Pulmonary/Chest: Breath sounds normal. She has no wheezes. She has no rhonchi. She has no rales.   Abdominal: Soft. Bowel sounds are normal. There is tenderness (McBurney's point, right lower quadrant and mild left lower quadrant).   Large abdominal habitus   Neurological: She is alert and oriented to person, place, and time. GCS eye subscore is 4. GCS verbal subscore is 5. GCS motor subscore is 6.   Skin: Skin is warm and dry. No rash noted.         ED Course    Procedures  Labs Reviewed   URINALYSIS, REFLEX TO URINE CULTURE - Abnormal; Notable for the following components:       Result Value    Appearance, UA Cloudy (*)     Protein, UA 1+ (*)     Occult Blood UA 2+ (*)     Nitrite, UA Positive (*)     Urobilinogen, UA 2.0-3.0 (*)     Leukocytes, UA 3+ (*)     All other components within normal limits    Narrative:     Preferred Collection Type->Urine, Clean Catch   CBC W/ AUTO DIFFERENTIAL - Abnormal; Notable for the following components:    WBC 15.85 (*)     Hematocrit 36.5 (*)     Platelets 356 (*)     Gran # (ANC) 13.0 (*)     Gran% 82.1 (*)     Lymph% 12.6 (*)     All other components within normal limits   BASIC METABOLIC PANEL - Abnormal; Notable for the following components:    Glucose 130 (*)     Anion Gap 7 (*)     eGFR if non  57 (*)     All other components within normal limits   URINALYSIS MICROSCOPIC - Abnormal; Notable for the following components:    RBC, UA 10 (*)     WBC, UA >100 (*)     WBC Clumps, UA Occasional (*)     Bacteria, UA Many (*)     All other components within normal limits    Narrative:     Preferred Collection Type->Urine, Clean Catch   CULTURE, URINE          Imaging Results          CT Abdomen Pelvis With Contrast (Final result)  Result time 03/28/19 23:41:14    Final result by Shadi Carlson MD (03/28/19 23:41:14)                 Impression:      1. No acute intra-abdominal abnormalities identified.  No evidence of appendicitis.  2. Cholelithiasis.  3. Nonspecific 5 cm cystic splenic lesion which demonstrates mild peripheral calcification.  4. Additional findings as detailed above.      Electronically signed by: Shadi Carlson MD  Date:    03/28/2019  Time:    23:41             Narrative:    EXAMINATION:  CT ABDOMEN PELVIS WITH CONTRAST    CLINICAL HISTORY:  RLQ pain, appendicitis suspected;    TECHNIQUE:  Low dose axial images, sagittal and coronal reformations were obtained from the lung bases to the pubic  symphysis following the IV administration of 100 mL of Omnipaque 350 .  Oral contrast was not given.    COMPARISON:  None.    FINDINGS:  The visualized portion of the heart is unremarkable.  The lung bases are clear.    No significant hepatic abnormalities are identified.  There is no intra-or extrahepatic biliary ductal dilatation.  Several prominent stones are seen within the gallbladder.  Nonspecific 5 cm cystic lesion is visualized in the spleen which demonstrates mild peripheral calcification.  Stomach, pancreas, and adrenal glands show no significant abnormalities.    Kidneys enhance normally with no evidence of hydronephrosis.  There is a 5 cm left renal cyst visualized.  No abnormalities are seen along the ureteral courses.  Urinary bladder is nondistended.  Uterus demonstrates a small calcified left-sided fibroid.    Appendix is visualized and is unremarkable.  The visualized loops of small and large bowel show no evidence of obstruction or inflammation.  No free air or free fluid.    Aorta tapers normally.    No acute osseous abnormality identified.  Multilevel degenerative changes are seen throughout the spine.  Subcutaneous soft tissue structures are unremarkable.                                 Medical Decision Making:   Initial Assessment:   Urgent evaluation of a 53 y.o. female with a medical history of CVA, hypertension, diabetes presenting to the emergency department complaining of right lower quadrant pain X11 hr. Patient is afebrile, nontoxic appearing and hemodynamically stable.  No peritoneal signs on exam.  Patient does have right lower quadrant and McBurney's point tenderness on exam.  Concern for appendicitis.  ED Management:  CBC reveals leukocytosis of almost 16,000. Chemistry reveals hyperglycemia of 130.  Creatinine is normal GFR 57.  CT abdominal pelvis reveals no acute intra-abdominal abnormalities.  There is no evidence of appendicitis.  Cholelithiasis is mention which patient has  known history of.  No signs of cholecystitis on exam.  There is mention of a nonspecific, 5 cm cystic splenic lesion.  Upon reexamination, patient states she is feeling better but is still having some mild cramping.  Repeat abdominal exam reveals mild epigastric tenderness. She does not appear tender to right lower abdomen.  Urinalysis is reflective a UTI.  Urinalysis is nitrite positive with 3+ leukocytes.  There are greater than 100 WBCs and occasional WBC clumps with many bacteria.  Urine culture sent.  Repeat vital signs reveal tachycardia.  She is afebrile.  Will treat with Rocephin here in the ED and sent home with Keflex.  Care signed over to Dr. Vivas pending decrease in heart rate after 2nd L of IV fluids.  Other:   I have discussed this case with another health care provider.              Attending Attestation:     Physician Attestation Statement for NP/PA:   I have conducted a face to face encounter with this patient in addition to the NP/PA, due to Medical Complexity    Other NP/PA Attestation Additions:      Medical Decision Makin-year-old female with history of CVA, HTN/DM presents with acute onset right lower abdominal pain x1 day, associated with nausea and lack of appetite.  Exam with right lower quadrant tenderness and elevated WBC on labs so CT abdomen done with no sign of appendicitis or other acute abnormalities except gallstones with no sign of cholecystitis.  She did have urinary odor and UA consistent with UTI, likely cause elevated WBC and abdominal pain. She was treated with Rocephin in the ED, and after IVF tachycardia resolved.  Patient ambulatory and back to baseline, will discharge with Keflex pending urine culture, PCP follow-up, and strict return precautions.                    Clinical Impression:     1. Cystitis    2. Bilateral lower abdominal discomfort                               ESTELITA Vázquez-C  [phone]       Jose Rafael Mo MD  [phone]

## 2019-03-31 LAB — BACTERIA UR CULT: NORMAL

## 2019-04-02 RX ORDER — IRBESARTAN 150 MG/1
150 TABLET ORAL NIGHTLY
Qty: 90 TABLET | Refills: 3 | Status: SHIPPED | OUTPATIENT
Start: 2019-04-02 | End: 2020-01-24 | Stop reason: SDUPTHER

## 2019-04-02 RX ORDER — AMLODIPINE BESYLATE 10 MG/1
TABLET ORAL
Qty: 90 TABLET | Refills: 3 | Status: SHIPPED | OUTPATIENT
Start: 2019-04-02 | End: 2020-01-24 | Stop reason: SDUPTHER

## 2019-04-02 NOTE — TELEPHONE ENCOUNTER
----- Message from Tamiko Carrillo sent at 4/2/2019 12:40 PM CDT -----  Can the clinic reply in [facility]:No       Please refill the medication(s) listed below. Please call the patient when the prescription(s) is ready for  at the phone number [phone]     Medication #1:amLODIPine (NORVASC) 10 MG tablet 90 day supply    Medication #2:irbesartan (AVAPRO) 150 MG tablet 90 day supply        Preferred Pharmacy: [facility] [address] [place] [address]

## 2019-04-03 ENCOUNTER — TELEPHONE (OUTPATIENT)
Dept: INTERNAL MEDICINE | Facility: CLINIC | Age: 54
End: 2019-04-03

## 2019-04-03 DIAGNOSIS — R53.1 LEFT-SIDED WEAKNESS: ICD-10-CM

## 2019-04-03 DIAGNOSIS — Z91.81 AT HIGH RISK FOR FALLS: ICD-10-CM

## 2019-04-03 DIAGNOSIS — I69.30 HISTORY OF CVA WITH RESIDUAL DEFICIT: Primary | ICD-10-CM

## 2019-04-03 DIAGNOSIS — R26.9 GAIT ABNORMALITY: ICD-10-CM

## 2019-04-03 NOTE — TELEPHONE ENCOUNTER
Spoke w/ pt and informed her that RX for walker has been signed and faxed to [facility]   Pt agree and has no further question

## 2019-04-03 NOTE — TELEPHONE ENCOUNTER
----- Message from Edson Rich sent at 4/3/2019  3:41 PM CDT -----  Contact: ANDRES SHORT [[id number]]  Name of Who is Calling: ANDRES SHORT [[id number]]      What is the request in detail: Pt is requesting a call back from clinical team. Pt states she need Rx for Medical Walker. Please contact to further discuss and advise.          Can the clinic reply by [facility]:       What Number to Call Back [facility]: [phone]

## 2019-04-30 ENCOUNTER — OFFICE VISIT (OUTPATIENT)
Dept: INTERNAL MEDICINE | Facility: CLINIC | Age: 54
End: 2019-04-30
Attending: INTERNAL MEDICINE
Payer: MEDICARE

## 2019-04-30 VITALS
OXYGEN SATURATION: 99 % | HEIGHT: 64 IN | BODY MASS INDEX: 36.17 KG/M2 | SYSTOLIC BLOOD PRESSURE: 136 MMHG | DIASTOLIC BLOOD PRESSURE: 92 MMHG | HEART RATE: 82 BPM | WEIGHT: 211.88 LBS

## 2019-04-30 DIAGNOSIS — G62.9 PERIPHERAL POLYNEUROPATHY: ICD-10-CM

## 2019-04-30 DIAGNOSIS — D73.4 SPLENIC CYST: ICD-10-CM

## 2019-04-30 DIAGNOSIS — R26.9 ABNORMALITY OF GAIT: ICD-10-CM

## 2019-04-30 DIAGNOSIS — I69.30 HISTORY OF CVA WITH RESIDUAL DEFICIT: ICD-10-CM

## 2019-04-30 DIAGNOSIS — R25.2 SPASTICITY: ICD-10-CM

## 2019-04-30 DIAGNOSIS — I10 ESSENTIAL HYPERTENSION: Primary | ICD-10-CM

## 2019-04-30 PROCEDURE — 99999 PR PBB SHADOW E&M-EST. PATIENT-LVL III: ICD-10-PCS | Mod: PBBFAC,,, | Performed by: INTERNAL MEDICINE

## 2019-04-30 PROCEDURE — 90732 PNEUMOCOCCAL POLYSACCHARIDE VACCINE 23-VALENT =>2YO SQ IM: ICD-10-PCS | Mod: S$GLB,,, | Performed by: INTERNAL MEDICINE

## 2019-04-30 PROCEDURE — 3075F PR MOST RECENT SYSTOLIC BLOOD PRESS GE 130-139MM HG: ICD-10-PCS | Mod: CPTII,S$GLB,, | Performed by: INTERNAL MEDICINE

## 2019-04-30 PROCEDURE — 99499 UNLISTED E&M SERVICE: CPT | Mod: S$GLB,,, | Performed by: INTERNAL MEDICINE

## 2019-04-30 PROCEDURE — 90732 PPSV23 VACC 2 YRS+ SUBQ/IM: CPT | Mod: S$GLB,,, | Performed by: INTERNAL MEDICINE

## 2019-04-30 PROCEDURE — 3080F DIAST BP >= 90 MM HG: CPT | Mod: CPTII,S$GLB,, | Performed by: INTERNAL MEDICINE

## 2019-04-30 PROCEDURE — 3075F SYST BP GE 130 - 139MM HG: CPT | Mod: CPTII,S$GLB,, | Performed by: INTERNAL MEDICINE

## 2019-04-30 PROCEDURE — 3080F PR MOST RECENT DIASTOLIC BLOOD PRESSURE >= 90 MM HG: ICD-10-PCS | Mod: CPTII,S$GLB,, | Performed by: INTERNAL MEDICINE

## 2019-04-30 PROCEDURE — 3008F PR BODY MASS INDEX (BMI) DOCUMENTED: ICD-10-PCS | Mod: CPTII,S$GLB,, | Performed by: INTERNAL MEDICINE

## 2019-04-30 PROCEDURE — 3008F BODY MASS INDEX DOCD: CPT | Mod: CPTII,S$GLB,, | Performed by: INTERNAL MEDICINE

## 2019-04-30 PROCEDURE — G0009 ADMIN PNEUMOCOCCAL VACCINE: HCPCS | Mod: S$GLB,,, | Performed by: INTERNAL MEDICINE

## 2019-04-30 PROCEDURE — 99499 RISK ADDL DX/OHS AUDIT: ICD-10-PCS | Mod: S$GLB,,, | Performed by: INTERNAL MEDICINE

## 2019-04-30 PROCEDURE — 99214 PR OFFICE/OUTPT VISIT, EST, LEVL IV, 30-39 MIN: ICD-10-PCS | Mod: 25,S$GLB,, | Performed by: INTERNAL MEDICINE

## 2019-04-30 PROCEDURE — 99999 PR PBB SHADOW E&M-EST. PATIENT-LVL III: CPT | Mod: PBBFAC,,, | Performed by: INTERNAL MEDICINE

## 2019-04-30 PROCEDURE — G0009 PNEUMOCOCCAL POLYSACCHARIDE VACCINE 23-VALENT =>2YO SQ IM: ICD-10-PCS | Mod: S$GLB,,, | Performed by: INTERNAL MEDICINE

## 2019-04-30 PROCEDURE — 99214 OFFICE O/P EST MOD 30 MIN: CPT | Mod: 25,S$GLB,, | Performed by: INTERNAL MEDICINE

## 2019-04-30 NOTE — PROGRESS NOTES
"Patient was given vaccine information sheet for the [id number] (pneumococcal polyvalent) vaccine. The area of injection was palpated using the acromion process as a landmark. This area was cleaned with alcohol. Using a 25g 1" safety needle, 0.5mL of the vaccine was placed into the right deltoid muscle. The injection site was dressed with a bandage. Patient experienced no complications and was discharged in stable condition. Pneumovax 23 (pneumococcal polyvalent) vaccine Lot: [id number]  Exp: [id number]    "

## 2019-04-30 NOTE — PROGRESS NOTES
"Subjective:   Patient ID: Caitlyn Perez is a 53 y.o. female  Chief complaint:   Chief Complaint   Patient presents with    Hospital Follow Up     abd discomfort & cystitis       HPI     Pt here for ER f/u for UTI - completed course of Keflex and reports and abd discomfort and dysuria and foul smelling urine all resolved   - completed rx and urine cx susceptible to this     HTN:   Did not take meds today and due a few hours ago as was rushing to appts   Home bp range: 120-130/70s   Prev well controlled at lcv   No cp or sob or vision changes   Agrees to take med upon arrival home     Reports is on waiting list for endocrine   And agrees to f/u with sleep for HSS    Review of Systems    Objective:  Vitals:    04/30/19 1349   BP: (!) 136/92   Pulse: 82   SpO2: 99%   Weight: 96.1 kg (211 lb 13.8 oz)   Height: 5' 4" (1.626 m)     Body mass index is 36.37 kg/m².    Physical Exam   Constitutional: She is oriented to person, place, and time. She appears well-developed and well-nourished.   HENT:   Head: Normocephalic and atraumatic.   Eyes: Conjunctivae and EOM are normal.   Neck: Normal range of motion. Neck supple.   Cardiovascular: Normal rate, regular rhythm and intact distal pulses.   Pulmonary/Chest: Effort normal and breath sounds normal.   Abdominal: Soft. Normal appearance and bowel sounds are normal. She exhibits no distension and no mass. There is no tenderness. There is no guarding.   Obese  No flank or cva ttp   Musculoskeletal: She exhibits no edema or tenderness.   Neurological: She is alert and oriented to person, place, and time. She has normal strength. Gait normal.   Skin: Skin is warm, dry and intact. No cyanosis. Nails show no clubbing.   Psychiatric: She has a normal mood and affect. Her speech is normal and behavior is normal. Cognition and memory are normal.   Vitals reviewed.      Assessment:  1. Essential hypertension    2. Spasticity    3. Peripheral polyneuropathy    4. History of CVA with " residual deficit    5. Abnormality of gait    6. Splenic cyst        Plan:  Caitlyn was seen today for hospital follow up.    Diagnoses and all orders for this visit:    Essential hypertension  Controlled at home and at last appt  Did not take meds today - resume meds and cont home bp monitoring    Spasticity  -     WHEELCHAIR FOR HOME USE  Stable - rx for rollator given    Peripheral polyneuropathy  -     WHEELCHAIR FOR HOME USE    History of CVA with residual deficit  -     WHEELCHAIR FOR HOME USE    Abnormality of gait  -     WHEELCHAIR FOR HOME USE    Splenic cyst  -     US Abdomen Complete; Future    Other orders  -     (In Office Administered) Pneumococcal Polysaccharide Vaccine (23 Valent) (SQ/IM)    UTI: resolved   Splenic cyst: will check US to eval further - no pain at this time     Health Maintenance   Topic Date Due    Pneumococcal Vaccine (Medium Risk) (1 of 1 - PPSV23) 07/12/1983    High Dose Statin  07/12/1985    Colonoscopy  01/25/2020    Mammogram  02/08/2020    Lipid Panel  02/18/2020    Pap Smear with HPV Cotest  02/11/2022    TETANUS VACCINE  12/27/2026    Hepatitis C Screening  Completed    Influenza Vaccine  Completed

## 2019-12-27 ENCOUNTER — TELEPHONE (OUTPATIENT)
Dept: INTERNAL MEDICINE | Facility: CLINIC | Age: 54
End: 2019-12-27

## 2019-12-27 DIAGNOSIS — E11.9 TYPE 2 DIABETES MELLITUS WITHOUT COMPLICATION, UNSPECIFIED WHETHER LONG TERM INSULIN USE: ICD-10-CM

## 2019-12-27 DIAGNOSIS — I10 ESSENTIAL HYPERTENSION: ICD-10-CM

## 2019-12-27 DIAGNOSIS — Z00.00 ANNUAL PHYSICAL EXAM: Primary | ICD-10-CM

## 2019-12-27 DIAGNOSIS — E78.2 MIXED HYPERLIPIDEMIA: ICD-10-CM

## 2019-12-27 DIAGNOSIS — E21.3 HYPERPARATHYROIDISM: ICD-10-CM

## 2019-12-27 DIAGNOSIS — E55.9 VITAMIN D DEFICIENCY: ICD-10-CM

## 2019-12-27 NOTE — TELEPHONE ENCOUNTER
----- Message from Rosy Arevalo sent at 12/27/2019 10:39 AM CST -----  Contact: ANDRES SHORT  Type: Lab    Caller is requesting to schedule their Lab appointment prior to annual appointment.    Order is not listed in EPIC.  Please enter order and contact patient to schedule.    Name of Caller: ANDRES SHORT    Preferred Date and Time of Labs: One week before 01/24    Date of EPP Appointment: 01/24/20    Where would they like the lab performed? N/a    Would the patient rather a call back or a response via [facility]? No    Best Call Back Number: [phone]    Additional Information: [phone]

## 2019-12-30 ENCOUNTER — TELEPHONE (OUTPATIENT)
Dept: INTERNAL MEDICINE | Facility: CLINIC | Age: 54
End: 2019-12-30

## 2019-12-30 NOTE — TELEPHONE ENCOUNTER
Call placed to patient to schedule lab work that patient requested. Patient was unavailable and I was not able to LVM. Will try to contact patient at end of day.     Avtar Benitez MA

## 2019-12-31 ENCOUNTER — TELEPHONE (OUTPATIENT)
Dept: INTERNAL MEDICINE | Facility: CLINIC | Age: 54
End: 2019-12-31

## 2020-01-03 ENCOUNTER — TELEPHONE (OUTPATIENT)
Dept: INTERNAL MEDICINE | Facility: CLINIC | Age: 55
End: 2020-01-03

## 2020-01-03 DIAGNOSIS — Z00.00 ANNUAL PHYSICAL EXAM: Primary | ICD-10-CM

## 2020-01-03 NOTE — TELEPHONE ENCOUNTER
----- Message from Dalia Arredondo sent at 1/3/2020  1:38 PM CST -----  Contact: ANDRES SHORT [[id number]]  Contact: ANDRES SHORT [[id number]]    What is the request in detail:  Patient is needing orders for blood work before scheduled appointment     Can the clinic reply by [facility]:  No      What Number to Call Back [facility] not in [facility]:   [phone]

## 2020-01-08 ENCOUNTER — TELEPHONE (OUTPATIENT)
Dept: INTERNAL MEDICINE | Facility: CLINIC | Age: 55
End: 2020-01-08

## 2020-01-08 NOTE — TELEPHONE ENCOUNTER
Attempted to contact patient in regards to scheduling labs but message received when calling is in Swedish. Put appointment reminders for lab and annual appointments that were previously scheduled in mail today.

## 2020-01-08 NOTE — TELEPHONE ENCOUNTER
----- Message from Shruthi Fowler sent at 1/8/2020 11:28 AM CST -----  Name of Who is Calling:ANDRES SHORT [[id number]]    What is the request in detail: Patient states she needs orders for annual visit  ...Please contact to further discuss and advise    Can the clinic reply by [facility]: no      What Number to Call Back [facility]: [phone]

## 2020-01-10 ENCOUNTER — PATIENT OUTREACH (OUTPATIENT)
Dept: ADMINISTRATIVE | Facility: HOSPITAL | Age: 55
End: 2020-01-10

## 2020-01-10 DIAGNOSIS — Z12.31 ENCOUNTER FOR SCREENING MAMMOGRAM FOR BREAST CANCER: Primary | ICD-10-CM

## 2020-01-12 ENCOUNTER — PATIENT OUTREACH (OUTPATIENT)
Dept: ADMINISTRATIVE | Facility: OTHER | Age: 55
End: 2020-01-12

## 2020-01-14 ENCOUNTER — OFFICE VISIT (OUTPATIENT)
Dept: PODIATRY | Facility: CLINIC | Age: 55
End: 2020-01-14
Payer: MEDICARE

## 2020-01-14 DIAGNOSIS — R26.9 ABNORMALITY OF GAIT: ICD-10-CM

## 2020-01-14 DIAGNOSIS — B35.1 DERMATOPHYTOSIS OF NAIL: ICD-10-CM

## 2020-01-14 DIAGNOSIS — G62.9 PERIPHERAL POLYNEUROPATHY: Primary | ICD-10-CM

## 2020-01-14 DIAGNOSIS — I69.30 HISTORY OF CVA WITH RESIDUAL DEFICIT: ICD-10-CM

## 2020-01-14 PROCEDURE — 11721 DEBRIDE NAIL 6 OR MORE: CPT | Mod: Q9,S$GLB,, | Performed by: PODIATRIST

## 2020-01-14 PROCEDURE — 99204 OFFICE O/P NEW MOD 45 MIN: CPT | Mod: 25,S$GLB,, | Performed by: PODIATRIST

## 2020-01-14 PROCEDURE — 99999 PR PBB SHADOW E&M-EST. PATIENT-LVL II: ICD-10-PCS | Mod: PBBFAC,,, | Performed by: PODIATRIST

## 2020-01-14 PROCEDURE — 99499 RISK ADDL DX/OHS AUDIT: ICD-10-PCS | Mod: S$GLB,,, | Performed by: PODIATRIST

## 2020-01-14 PROCEDURE — 99999 PR PBB SHADOW E&M-EST. PATIENT-LVL II: CPT | Mod: PBBFAC,,, | Performed by: PODIATRIST

## 2020-01-14 PROCEDURE — 99499 UNLISTED E&M SERVICE: CPT | Mod: S$GLB,,, | Performed by: PODIATRIST

## 2020-01-14 PROCEDURE — 99204 PR OFFICE/OUTPT VISIT, NEW, LEVL IV, 45-59 MIN: ICD-10-PCS | Mod: 25,S$GLB,, | Performed by: PODIATRIST

## 2020-01-14 PROCEDURE — 11721 ROUTINE FOOT CARE: ICD-10-PCS | Mod: Q9,S$GLB,, | Performed by: PODIATRIST

## 2020-01-14 RX ORDER — LIDOCAINE 50 MG/G
1 PATCH TOPICAL DAILY PRN
Qty: 30 PATCH | Refills: 1 | Status: SHIPPED | OUTPATIENT
Start: 2020-01-14 | End: 2020-02-04 | Stop reason: SDUPTHER

## 2020-01-14 NOTE — PATIENT INSTRUCTIONS
General nail care measures for abnormal nails include:    ? Keeping nails trimmed short    ? Avoiding trauma     ? Avoiding contact irritants     ? Keeping nails dry (avoiding wet work)    ? Avoiding all nail cosmetics   How to Check Your Feet    Below are tips to help you look for foot problems. Try to check your feet at the same time each day, such as when you get out of bed in the morning.    · Check the top of each foot. The tops of toes, back of the heel, and outer edge of the foot can get a lot of rubbing from poor-fitting shoes.    · Check the bottom of each foot. Daily wear and tear often leads to problems at pressure spots.    · Check the toes and nails. Fungal infections often occur between toes. Toenail problems can also be a sign of fungal infections or lead to breaks in the skin.    · Check your shoes, too. Loose objects inside a shoe can injure the foot. Use your hand to feel inside your shoes for things like doroteo, loose stitching, or rough areas that could irritate your skin.        Diabetic Foot Care    Diabetes can lead to a number of different foot complications. Fortunately, most of these complications can be prevented with a little extra foot care. If diabetes is not well controlled, the high blood sugar can cause damage to blood vessels and result in poor circulation to the foot. When the skin does not get enough blood flow, it becomes prone to pressure sores and ulcers, which heal slowly.  High blood sugar can also damage nerves, interfering with the ability to feel pain and pressure. When you cant feel your foot normally, it is easy to injure your skin, bones and joints without knowing it. For these reasons diabetes increases the risk of fungal infections, bunions and ulcers. Deep ulcers can lead to bone infection. Gangrene is the most serious foot complication of diabetes. It usually occurs on the tips of the toes as blacked areas of skin. The black area is dead tissue. In severe cases,  gangrene spreads to involve the entire toe, other toes and the entire foot. Foot or toe amputation may be required. Good foot care and blood sugar control can prevent this.    Home Care  1. Wear comfortable, proper fitting shoes.  2. Wash your feet daily with warm water and mild soap.  3. After drying, apply a moisturizing cream or lotion.  4. Check your feet daily for skin breaks, blisters, swelling, or redness. Look between your toes also.  5. Wear cotton socks and change them every day.  6. Trim toe nails carefully and do not cut your cuticles.  7. Strive to keep your blood sugar under control with a combination of medicines, diet and activity.  8. If you smoke and have diabetes, it is very important that you stop. Smoking reduces blood flow to your foot.  9. Avoid activities that increase your risk of foot injury:  · Do not walk barefoot.  · Do not use heating pads or hot water bottles on your feet.  · Do not put your foot in a hot tub without first checking the temperature with your hand.  10) Schedule yearly foot exams.    Follow Up  with your doctor or as advised by our staff. Report any cut, puncture, scrape, other injury, blister, ingrown toenail or ulcer on your foot.    Get Prompt Medical Attention  if any of the following occur:  -- Open ulcer with pus draining from the wound  -- Increasing foot or leg pain  -- New areas of redness or swelling or tender areas of the foot    © 4346-2825 [employer]. [address] [place] [address], [place] [address]. All rights reserved. This information is not intended as a substitute for professional medical care. Always follow your healthcare professional's instructions.

## 2020-01-14 NOTE — PROGRESS NOTES
Chief Complaint   Patient presents with    Diabetes Mellitus     PCP Jeanette Delacruz              HPI:   The patient is a 54 y.o.  female  who presents for a foot exam.     Patient reports  presence of abnormal sensation to the feet .    On gabapentin but that makes her too sleepy  History of diabetic foot ulcers: none   History of foot surgery: none.     Shoes worn today:  Athletic with AFO left       Primary care doctor is: Jeanette Delacruz MD  Last visit: upcoming appt on 1/24/20  Diabetes Mellitus (PCP Jeanette Delacruz)           Patient Active Problem List   Diagnosis    Essential hypertension    Mixed hyperlipidemia    Hyperparathyroidism    Obesity due to excess calories    Peripheral neuropathy    History of abnormal cervical Pap smear    Spastic hemiparesis    Central pain syndrome    Abnormality of gait    BMI 32.0-32.9,adult    History of CVA with residual deficit    Elevated LFTs    Family history of malignant neoplasm of breast    IGT (impaired glucose tolerance)    Osteopenia    Vitamin D deficiency    At high risk for breast cancer    NAFLD (nonalcoholic fatty liver disease)           Current Outpatient Medications on File Prior to Visit   Medication Sig Dispense Refill    amLODIPine (NORVASC) 10 MG tablet TAKE 1 TABLET(10 MG) BY MOUTH EVERY DAY 90 tablet 3    aspirin (ECOTRIN) 81 MG EC tablet Take 81 mg by mouth once daily.      CYANOCOBALAMIN, VITAMIN B-12, (VITAMIN B-12 ORAL) Take 1 tablet by mouth once daily.      irbesartan (AVAPRO) 150 MG tablet Take 1 tablet (150 mg total) by mouth every evening. 90 tablet 3    KRILL OIL ORAL Take 1 capsule by mouth once daily.      LUTEIN ORAL Take by mouth once daily.      MULTIVITAMIN W-MINERALS/LUTEIN (CENTRUM SILVER ORAL) Take 1 tablet by mouth once daily.      ondansetron (ZOFRAN) 4 MG tablet Take 1 tablet (4 mg total) by mouth every 6 (six) hours as needed for Nausea. 12 tablet 0    blood sugar diagnostic Strp True Test blood  glucose test strips & lancets. Check blood sugars daily. 3 month supply. 100 each 3    gabapentin (NEURONTIN) 100 MG capsule Take 1 capsule (100 mg total) by mouth every evening. (Patient not taking: Reported on 1/14/2020) 30 capsule 1     No current facility-administered medications on file prior to visit.            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   Social Needs    Financial resource strain: Not on file    Food insecurity:     Worry: Not on file     Inability: Not on file    Transportation needs:     Medical: Not on file     Non-medical: Not on file   Tobacco Use    Smoking status: Never Smoker    Smokeless tobacco: Never Used   Substance and Sexual Activity    Alcohol use: Yes    Drug use: No    Sexual activity: Not Currently     Partners: Male     Birth control/protection: None   Lifestyle    Physical activity:     Days per week: Not on file     Minutes per session: Not on file    Stress: Not on file   Relationships    Social connections:     Talks on phone: Not on file     Gets together: Not on file     Attends Religion service: Not on file     Active member of club or organization: Not on file     Attends meetings of clubs or organizations: Not on file     Relationship status: Not on file   Other Topics Concern    Not on file   Social History Narrative    Walks for exercise    4 of her 6 children live with her.  15, 15, 17, yo and 27 live with her           ROS:  General ROS: negative  Respiratory ROS: no cough, shortness of breath, or wheezing  Cardiovascular ROS: no chest pain or dyspnea on exertion  Musculoskeletal ROS: negative  Neurological ROS:   positive for - impaired  coordination/balance or numbness/tingling  Dermatological ROS: negative      LAST HbA1c:   Lab Results   Component Value Date    HGBA1C 5.7 (H) 02/18/2019           EXAM:   There were no vitals filed for this visit.    General: alert, no distress, cooperative    Vascular:   Dorsalis Pedis:  present   Posterior Tibial:  present  Capillary refill time:  3 seconds  Temperature of toes warm to touch  Edema: Present bilaterally      Neurological:     Sharp touch:  normal  Light touch: normal  Tinels Sign:  Absent  Mulders Click:   Absent  [place]:  absent  +paresthesias (Abnormal spontaneous sensations in feet)      Dermatological:   Skin: thin and shiny  Hair growth:  decreased  Wounds/Ulcers:  Absent  Bruising:  Absent  Erythema:  Absent  Toenails:  Elongated and thickened and Yellowish in color,  with subungual debris.   Absent paronychia      Musculoskeletal:   Metatarsophalangeal range of motion:   diminished range of motion  Subtalar joint range of motion: diminished range of motion  Ankle joint range of motion:  diminished range of motion  Bunions:  Absent  Hammertoes: Present               ASSESSMENT/PLAN:          Problem List Items Addressed This Visit     Abnormality of gait    Relevant Medications    lidocaine (LIDODERM) 5 %    Other Relevant Orders    DIABETIC SHOES FOR HOME USE    ORTHOTIC DEVICE (DME)    History of CVA with residual deficit    Relevant Medications    lidocaine (LIDODERM) 5 %    Other Relevant Orders    DIABETIC SHOES FOR HOME USE    ORTHOTIC DEVICE (DME)    Peripheral neuropathy - Primary    Relevant Medications    lidocaine (LIDODERM) 5 %    Other Relevant Orders    DIABETIC SHOES FOR HOME USE    ORTHOTIC DEVICE (DME)      Other Visit Diagnoses     Dermatophytosis of nail                I counseled the patient on the patient's conditions, their implications and medical management.     Shoe inspection. Diabetic Foot Education. Patient reminded of the importance of good nutrition and blood  sugar control to help prevent podiatric complications of diabetes. Patient instructed on proper foot hygiene. We discussed wearing proper shoe gear, daily foot inspections, never walking without protective shoe gear, never putting sharp instruments to feet.    Lidoderm patch per patient request.     Routine Foot Care  Date/Time: 1/14/2020 3:45 PM  Performed by: Carmen Mas DPM  Authorized by: Carmen Mas DPM     Consent Done?:  Yes (Verbal)    Nail Care Type:  Debride  Location(s): All  (Left 1st Toe, Left 3rd Toe, Left 2nd Toe, Left 4th Toe, Left 5th Toe, Right 1st Toe, Right 2nd Toe, Right 3rd Toe, Right 4th Toe and Right 5th Toe)  Patient tolerance:  Patient tolerated the procedure well with no immediate complications     With patient's permission, the toenails mentioned above were aggressively reduced and debrided using a nail nipper, removing all offending nail and debris. The patient will continue to monitor the areas daily, inspect the feet, wear protective shoe gear when ambulatory, and moisturizer to maintain skin integrity.                     Carmen Mas DPM  NPI: [id number]       [facility] - PODIATRY  [address] [place] [id number]  Dept: [phone]  Dept Fax: [phone]

## 2020-01-15 ENCOUNTER — TELEPHONE (OUTPATIENT)
Dept: PODIATRY | Facility: CLINIC | Age: 55
End: 2020-01-15

## 2020-01-15 NOTE — TELEPHONE ENCOUNTER
Left voice message for Arielle to give the office a call back at [phone]. Called to get the fax number to fax over pt's clinic notes.

## 2020-01-15 NOTE — TELEPHONE ENCOUNTER
----- Message from Dalia Arredondo sent at 1/15/2020 12:38 PM CST -----  Contact: Yocasta ( [facility] )  Contact: Yocasta ( [facility] )    What is the request in detail:   Requesting a call in regards to the diabetic shoes she states she needs the last clinical notes to support the reason for shoes     Can the clinic reply by [facility]:  No      What Number to Call Back if not in [facility]:  [phone]

## 2020-01-21 ENCOUNTER — LAB VISIT (OUTPATIENT)
Dept: LAB | Facility: OTHER | Age: 55
End: 2020-01-21
Attending: INTERNAL MEDICINE
Payer: MEDICARE

## 2020-01-21 DIAGNOSIS — E78.2 MIXED HYPERLIPIDEMIA: ICD-10-CM

## 2020-01-21 DIAGNOSIS — E55.9 VITAMIN D DEFICIENCY: ICD-10-CM

## 2020-01-21 DIAGNOSIS — E11.9 TYPE 2 DIABETES MELLITUS WITHOUT COMPLICATION, UNSPECIFIED WHETHER LONG TERM INSULIN USE: ICD-10-CM

## 2020-01-21 DIAGNOSIS — E21.3 HYPERPARATHYROIDISM: ICD-10-CM

## 2020-01-21 DIAGNOSIS — I10 ESSENTIAL HYPERTENSION: ICD-10-CM

## 2020-01-21 LAB
25(OH)D3+25(OH)D2 SERPL-MCNC: 39 NG/ML (ref 30–96)
ALBUMIN SERPL BCP-MCNC: 4.3 G/DL (ref 3.5–5.2)
ALP SERPL-CCNC: 169 U/L (ref 55–135)
ALT SERPL W/O P-5'-P-CCNC: 89 U/L (ref 10–44)
ANION GAP SERPL CALC-SCNC: 10 MMOL/L (ref 8–16)
AST SERPL-CCNC: 43 U/L (ref 10–40)
BASOPHILS # BLD AUTO: 0.05 K/UL (ref 0–0.2)
BASOPHILS NFR BLD: 0.7 % (ref 0–1.9)
BILIRUB SERPL-MCNC: 0.7 MG/DL (ref 0.1–1)
BUN SERPL-MCNC: 11 MG/DL (ref 6–20)
CALCIUM SERPL-MCNC: 10.9 MG/DL (ref 8.7–10.5)
CHLORIDE SERPL-SCNC: 105 MMOL/L (ref 95–110)
CHOLEST SERPL-MCNC: 215 MG/DL (ref 120–199)
CHOLEST/HDLC SERPL: 3.6 {RATIO} (ref 2–5)
CO2 SERPL-SCNC: 27 MMOL/L (ref 23–29)
CREAT SERPL-MCNC: 1 MG/DL (ref 0.5–1.4)
DIFFERENTIAL METHOD: ABNORMAL
EOSINOPHIL # BLD AUTO: 0.2 K/UL (ref 0–0.5)
EOSINOPHIL NFR BLD: 2.1 % (ref 0–8)
ERYTHROCYTE [DISTWIDTH] IN BLOOD BY AUTOMATED COUNT: 13.7 % (ref 11.5–14.5)
EST. GFR  (AFRICAN AMERICAN): >60 ML/MIN/1.73 M^2
EST. GFR  (NON AFRICAN AMERICAN): >60 ML/MIN/1.73 M^2
ESTIMATED AVG GLUCOSE: 123 MG/DL (ref 68–131)
GLUCOSE SERPL-MCNC: 106 MG/DL (ref 70–110)
HBA1C MFR BLD HPLC: 5.9 % (ref 4–5.6)
HCT VFR BLD AUTO: 42.6 % (ref 37–48.5)
HDLC SERPL-MCNC: 59 MG/DL (ref 40–75)
HDLC SERPL: 27.4 % (ref 20–50)
HGB BLD-MCNC: 13.1 G/DL (ref 12–16)
IMM GRANULOCYTES # BLD AUTO: 0.02 K/UL (ref 0–0.04)
IMM GRANULOCYTES NFR BLD AUTO: 0.3 % (ref 0–0.5)
LDLC SERPL CALC-MCNC: 132.8 MG/DL (ref 63–159)
LYMPHOCYTES # BLD AUTO: 1.8 K/UL (ref 1–4.8)
LYMPHOCYTES NFR BLD: 24.9 % (ref 18–48)
MCH RBC QN AUTO: 26.8 PG (ref 27–31)
MCHC RBC AUTO-ENTMCNC: 30.8 G/DL (ref 32–36)
MCV RBC AUTO: 87 FL (ref 82–98)
MONOCYTES # BLD AUTO: 0.4 K/UL (ref 0.3–1)
MONOCYTES NFR BLD: 6 % (ref 4–15)
NEUTROPHILS # BLD AUTO: 4.7 K/UL (ref 1.8–7.7)
NEUTROPHILS NFR BLD: 66 % (ref 38–73)
NONHDLC SERPL-MCNC: 156 MG/DL
NRBC BLD-RTO: 0 /100 WBC
PLATELET # BLD AUTO: 371 K/UL (ref 150–350)
PMV BLD AUTO: 11 FL (ref 9.2–12.9)
POTASSIUM SERPL-SCNC: 4.1 MMOL/L (ref 3.5–5.1)
PROT SERPL-MCNC: 7.9 G/DL (ref 6–8.4)
PTH-INTACT SERPL-MCNC: 228.9 PG/ML (ref 9–77)
RBC # BLD AUTO: 4.88 M/UL (ref 4–5.4)
SODIUM SERPL-SCNC: 142 MMOL/L (ref 136–145)
TRIGL SERPL-MCNC: 116 MG/DL (ref 30–150)
TSH SERPL DL<=0.005 MIU/L-ACNC: 1.79 UIU/ML (ref 0.4–4)
WBC # BLD AUTO: 7.12 K/UL (ref 3.9–12.7)

## 2020-01-21 PROCEDURE — 80053 COMPREHEN METABOLIC PANEL: CPT

## 2020-01-21 PROCEDURE — 80061 LIPID PANEL: CPT

## 2020-01-21 PROCEDURE — 85025 COMPLETE CBC W/AUTO DIFF WBC: CPT

## 2020-01-21 PROCEDURE — 83970 ASSAY OF PARATHORMONE: CPT

## 2020-01-21 PROCEDURE — 36415 COLL VENOUS BLD VENIPUNCTURE: CPT

## 2020-01-21 PROCEDURE — 83036 HEMOGLOBIN GLYCOSYLATED A1C: CPT

## 2020-01-21 PROCEDURE — 84443 ASSAY THYROID STIM HORMONE: CPT

## 2020-01-21 PROCEDURE — 82306 VITAMIN D 25 HYDROXY: CPT

## 2020-01-24 ENCOUNTER — OFFICE VISIT (OUTPATIENT)
Dept: INTERNAL MEDICINE | Facility: CLINIC | Age: 55
End: 2020-01-24
Attending: INTERNAL MEDICINE
Payer: MEDICARE

## 2020-01-24 VITALS
OXYGEN SATURATION: 98 % | DIASTOLIC BLOOD PRESSURE: 86 MMHG | SYSTOLIC BLOOD PRESSURE: 116 MMHG | HEART RATE: 94 BPM | HEIGHT: 64 IN | BODY MASS INDEX: 35.71 KG/M2 | WEIGHT: 209.19 LBS

## 2020-01-24 DIAGNOSIS — G81.10 SPASTIC HEMIPARESIS: ICD-10-CM

## 2020-01-24 DIAGNOSIS — I10 ESSENTIAL HYPERTENSION: ICD-10-CM

## 2020-01-24 DIAGNOSIS — Z12.11 SCREENING FOR COLON CANCER: ICD-10-CM

## 2020-01-24 DIAGNOSIS — E21.3 HYPERPARATHYROIDISM: ICD-10-CM

## 2020-01-24 DIAGNOSIS — K76.0 FATTY LIVER: ICD-10-CM

## 2020-01-24 DIAGNOSIS — E66.01 SEVERE OBESITY (BMI 35.0-39.9) WITH COMORBIDITY: ICD-10-CM

## 2020-01-24 DIAGNOSIS — R73.02 IGT (IMPAIRED GLUCOSE TOLERANCE): ICD-10-CM

## 2020-01-24 DIAGNOSIS — E78.2 MIXED HYPERLIPIDEMIA: ICD-10-CM

## 2020-01-24 DIAGNOSIS — R26.9 ABNORMALITY OF GAIT: ICD-10-CM

## 2020-01-24 DIAGNOSIS — E55.9 VITAMIN D DEFICIENCY: Primary | ICD-10-CM

## 2020-01-24 DIAGNOSIS — M85.80 OSTEOPENIA, UNSPECIFIED LOCATION: ICD-10-CM

## 2020-01-24 DIAGNOSIS — I69.30 HISTORY OF CVA WITH RESIDUAL DEFICIT: ICD-10-CM

## 2020-01-24 PROCEDURE — 90686 FLU VACCINE (QUAD) GREATER THAN OR EQUAL TO 3YO PRESERVATIVE FREE IM: ICD-10-PCS | Mod: S$GLB,,, | Performed by: INTERNAL MEDICINE

## 2020-01-24 PROCEDURE — G0008 FLU VACCINE (QUAD) GREATER THAN OR EQUAL TO 3YO PRESERVATIVE FREE IM: ICD-10-PCS | Mod: S$GLB,,, | Performed by: INTERNAL MEDICINE

## 2020-01-24 PROCEDURE — 3008F BODY MASS INDEX DOCD: CPT | Mod: CPTII,S$GLB,, | Performed by: INTERNAL MEDICINE

## 2020-01-24 PROCEDURE — 99999 PR PBB SHADOW E&M-EST. PATIENT-LVL V: CPT | Mod: PBBFAC,,, | Performed by: INTERNAL MEDICINE

## 2020-01-24 PROCEDURE — 90686 IIV4 VACC NO PRSV 0.5 ML IM: CPT | Mod: S$GLB,,, | Performed by: INTERNAL MEDICINE

## 2020-01-24 PROCEDURE — 99499 UNLISTED E&M SERVICE: CPT | Mod: S$GLB,,, | Performed by: INTERNAL MEDICINE

## 2020-01-24 PROCEDURE — 99214 PR OFFICE/OUTPT VISIT, EST, LEVL IV, 30-39 MIN: ICD-10-PCS | Mod: 25,S$GLB,, | Performed by: INTERNAL MEDICINE

## 2020-01-24 PROCEDURE — 3074F SYST BP LT 130 MM HG: CPT | Mod: CPTII,S$GLB,, | Performed by: INTERNAL MEDICINE

## 2020-01-24 PROCEDURE — 3079F DIAST BP 80-89 MM HG: CPT | Mod: CPTII,S$GLB,, | Performed by: INTERNAL MEDICINE

## 2020-01-24 PROCEDURE — 99499 RISK ADDL DX/OHS AUDIT: ICD-10-PCS | Mod: S$GLB,,, | Performed by: INTERNAL MEDICINE

## 2020-01-24 PROCEDURE — 3074F PR MOST RECENT SYSTOLIC BLOOD PRESSURE < 130 MM HG: ICD-10-PCS | Mod: CPTII,S$GLB,, | Performed by: INTERNAL MEDICINE

## 2020-01-24 PROCEDURE — 3008F PR BODY MASS INDEX (BMI) DOCUMENTED: ICD-10-PCS | Mod: CPTII,S$GLB,, | Performed by: INTERNAL MEDICINE

## 2020-01-24 PROCEDURE — 99214 OFFICE O/P EST MOD 30 MIN: CPT | Mod: 25,S$GLB,, | Performed by: INTERNAL MEDICINE

## 2020-01-24 PROCEDURE — G0008 ADMIN INFLUENZA VIRUS VAC: HCPCS | Mod: S$GLB,,, | Performed by: INTERNAL MEDICINE

## 2020-01-24 PROCEDURE — 3079F PR MOST RECENT DIASTOLIC BLOOD PRESSURE 80-89 MM HG: ICD-10-PCS | Mod: CPTII,S$GLB,, | Performed by: INTERNAL MEDICINE

## 2020-01-24 PROCEDURE — 99999 PR PBB SHADOW E&M-EST. PATIENT-LVL V: ICD-10-PCS | Mod: PBBFAC,,, | Performed by: INTERNAL MEDICINE

## 2020-01-24 RX ORDER — AMLODIPINE BESYLATE 10 MG/1
TABLET ORAL
Qty: 90 TABLET | Refills: 3 | Status: SHIPPED | OUTPATIENT
Start: 2020-01-24 | End: 2020-07-15 | Stop reason: SDUPTHER

## 2020-01-24 RX ORDER — IRBESARTAN 150 MG/1
150 TABLET ORAL NIGHTLY
Qty: 90 TABLET | Refills: 3 | Status: SHIPPED | OUTPATIENT
Start: 2020-01-24 | End: 2020-06-16 | Stop reason: SDUPTHER

## 2020-01-24 NOTE — PATIENT INSTRUCTIONS
"  MyPlate Worksheet: 1,400 Calories  Your calorie needs are about 1,400 calories a day. Below are the U.S. Department of Agriculture (USDA) guidelines for your daily recommended amount of each food group.  Vegetables  1½ cups Fruits  1½ cups Grains  5 ounces Dairy  2½ cups Protein  4 ounces   Eat a variety of vegetables each day.  Aim for these amounts each week:  · 1 cup dark green vegetables  · 3 cups red or orange-colored vegetables  · ½ cup dry beans and peas  · 3½ cups starchy vegetables  · 2½ cups other vegetables Eat a variety of fruits each day.  Go easy on fruit juices.  Good choices of fruits include:  · Berries  · Bananas  · Apples  · Melon  · Dried fruit  · Frozen fruit  · Canned fruit    Choose whole grains whenever you can.  Aim to eat at least 2½ ounces of whole grains each day:  · Bread  · Cereal  · Rice  · Pasta  · Potatoes  · Tortillas    Choose low-fat or fat-free milk, yogurt, or cheese each day.  Good choices include:  · Low-fat or fat-free milk or chocolate milk  · Low-fat or fat-free yogurt  · Low-fat or fat-free cottage cheese or other reduced-fat cheeses  · Calcium-fortified milk alternatives Choose low-fat or lean meats, poultry, fish and seafood each day.  Vary your protein. Choose more:  · Fish and other seafood  · Lean low-fat meat and poultry  · Eggs  · Beans, peas  · Tofu  · Unsalted nuts and seeds  Choose less high-fat and red meat.   Source: USDA MyPlate, www.choosemyplate.gov  Know your limits on oils (fats) and sugars:  · Your allowance for oils is 17 grams or about 4 teaspoons a day (oil includes vegetable oil, mayonnaise, soft margarine, salad dressing, nuts, olives, avocados, and some fish).  · Limit the extras (solid fats and sugars, also called "empty calories") to 110 calories a day.  · Cut back on salt (sodium). Stay under 2,300 mg sodium a day. If you have a health condition such as heart disease or high blood pressure, your doctor will likely tell you to limit sodium to no " more than 1,500 mg a day.   Get moving and be active!  Aim for at least 30 minutes of physical activity most days of the week or 150 minutes of exercise a week.  MyPlate Servings Worksheet: 1,400 calories  This worksheet tells you how many servings you should get each day from each food group, and tells you how much food makes a serving. Use this as a guide as you plan your meals throughout the day. Track your progress daily by writing in what you actually ate.  Food Group  Daily MyPlate Goal  What You Ate Today    Vegetables 3 Half-cups or 3 Servings  One serving is:  ½ cup cut-up raw or cooked vegetables  1 cup raw, leafy vegetables  ½ baked sweet potato  ½ cup vegetable juice  Note: At meals, fill half your plate with vegetables and fruit.     Fruits 3 Half-cups or 3 Servings  One serving is:  ½ cup fresh, frozen, or canned fruit  1 medium piece of fruit  1 cup of berries or melon  ½ cup dried fruit  ½ cup 100% fruit juice  Note: Make most choices fruit instead of juice.     Grains 5 Servings or 5 Ounces  One serving is:  1 slice bread  1 cup dry cereal  ½ cup cooked rice, pasta, or cereal  1 5-inch tortilla  Note: Choose whole grains for at least half of your servings each day.     Dairy 2½ Servings or 2½ Cups  One serving is:  1 cup milk  1½ ounces reduced-fat hard cheese  2 ounces processed cheese  1 cup low-fat yogurt  1/3 cup shredded cheese  Note: Choose low-fat or fat-free most often.     Protein 4 Servings or 4 Ounces  One serving is:  1 ounce cooked lean beef, pork, lamb, or ham  1 ounce cooked chicken or turkey (no skin)  1 ounce cooked fish or shellfish (not fried)  1 egg  ¼ cup egg substitute  ½ ounce nuts or seeds  1 tablespoon peanut or almond butter  ¼ cup cooked dry beans or peas  ½ cup tofu  2 tablespoons hummus     Date Last Reviewed: 6/1/2015  © 3259-8244 [employer]. [address] [place] [address], [place] [address]. All rights reserved. This information is not intended as a substitute  "for professional medical care. Always follow your healthcare professional's instructions.        MyPlate Worksheet: 1,200 Calories  Your calorie needs are about 1,200 calories a day. Below are the U.S. Department of Agriculture (USDA) guidelines for your daily recommended amount of each food group.  Vegetables  1½ cups Fruits  1 cup Grains  4 ounces Dairy  2½ cups Protein  3 ounces   Eat a variety of vegetables each day.  Aim for these amounts each week:  · 1 cup dark green vegetables  · 3 cups red or orange-colored vegetables  · ½ cup dry beans and peas  · 3½ cups starchy vegetables  · 2½ cups other vegetables Eat a variety of fruits each day.  Go easy on fruit juices.  Good choices of fruits include:  · Berries  · Bananas  · Apples  · Melon  · Dried fruit  · Frozen fruit  · Canned fruit Choose whole grains whenever you can.  Aim to eat at least 2 ounces of whole grains each day:  · Bread  · Cereal  · Rice  · Pasta  · Potatoes  · Tortillas Choose low-fat or fat-free milk, yogurt, or cheese each day.  Good choices include:  · Low-fat or fat-free milk or chocolate milk  · Low-fat or fat-free yogurt  · Low-fat or fat-free cottage cheese or other reduced-fat cheeses  · Calcium-fortified milk alternatives Choose low-fat or lean meats, poultry, fish and seafood each day.  Vary your protein. Choose more:  · Fish and other seafood  · Lean low-fat meat and poultry  · Eggs  · Beans, peas  · Tofu  · Unsalted nuts and seeds  Choose less high-fat and red meat.   Source: USDA MyPlate, www.choosemyplate.gov  Know your limits on oils (fats) and sugars:  · Your allowance for oils is 17 grams or about 4 teaspoons a day (oil includes vegetable oil, mayonnaise, soft margarine, salad dressing, nuts, olives, avocados, and some fish).  · Limit the extras (solid fats and sugars, also called "empty calories") to 100 calories a day.  · Cut back on salt (sodium). Stay under 2,300 mg sodium a day. If you have a health condition such as heart " disease or high blood pressure, your doctor will likely tell you to limit sodium to no more than 1,500 mg a day.  Get moving and be active!  Aim for at least 30 minutes of physical activity most days of the week or 150 minutes of exercise a week.  MyPlate Servings Worksheet: 1,200 calories  This worksheet tells you how many servings you should get each day from each food group, and tells you how much food makes a serving. Use this as a guide as you plan your meals throughout the day. Track your progress daily by writing in what you actually ate.  Food Group  Daily MyPlate Goal  What You Ate Today    Vegetables 3 Half-cups or 3 Servings  One serving is:  ½ cup cut-up raw or cooked vegetables  1 cup raw, leafy vegetables  ½ baked sweet potato  ½ cup vegetable juice  Note: At meals, fill half your plate with vegetables and fruit.     Fruits 2 Half-cups or 2 Servings  One serving is:  ½ cup fresh, frozen, or canned fruit  1 medium piece of fruit  1 cup of berries or melon  ½ cup dried fruit  ½ cup 100% fruit juice  Note: Make most choices fruit instead of juice.     Grains 4 Servings or 4 Ounces  One serving is:  1 slice bread  1 cup dry cereal  ½ cup cooked rice, pasta, or cereal  1 5-inch tortilla  Note: Choose whole grains for at least half of your servings each day.     Dairy 2 Servings or 2 Cups  One serving is:  1 cup milk  1½ ounces reduced-fat hard cheese  2 ounces processed cheese  1 cup low-fat yogurt  1/3 cup shredded cheese  Note: Choose low-fat or fat-free most often.     Protein 3 Servings or 3 Ounces  One serving is:  1 ounce cooked lean beef, pork, lamb, or ham  1 ounce cooked chicken or turkey (no skin)  1 ounce cooked fish or shellfish (not fried)  1 egg  ¼ cup egg substitute  ½ ounce nuts or seeds  1 tablespoon peanut or almond butter  ¼ cup cooked dry beans or peas  ½ cup tofu  2 tablespoons hummus     Date Last Reviewed: 6/1/2015  © 0356-8672 [employer]. [address] [place] [address],  "[place] [address]. All rights reserved. This information is not intended as a substitute for professional medical care. Always follow your healthcare professional's instructions.          MyPlate Worksheet: 1,600 Calories  Your calorie needs are about 1,600 calories a day. Below are the U.S. Department of Agriculture (USDA) guidelines for your daily recommended amount of each food group.  Vegetables  2 cups Fruits  1½ cups Grains  5 ounces Dairy  3 cups Protein  5 ounces   Eat a variety of vegetables each day.  Aim for these amounts each week:  · 1½ cups dark green vegetables  · 4 cups red or orange-colored vegetables  · 1 cup dry beans and peas  · 4 cups starchy vegetables  · 3½ cups other vegetables Eat a variety of fruits each day.  Go easy on fruit juices.  Good choices of fruits include:  · Berries  · Bananas  · Apples  · Melon  · Dried fruit  · Frozen fruit  · Canned fruit    Choose whole grains whenever you can.  Aim to eat at least 2½ ounces of whole grains each day:  · Bread  · Cereal  · Rice  · Pasta  · Potatoes  · Tortillas Choose low-fat or fat-free milk, yogurt, or cheese each day.  Good choices include:  · Low-fat or fat-free milk or chocolate milk  · Low-fat or fat-free yogurt  · Low-fat or fat-free cottage cheese or other reduced-fat cheeses  · Calcium-fortified milk alternatives Choose low-fat or lean meats, poultry, fish and seafood each day.  Vary your protein. Choose more:  · Fish and other seafood  · Lean low-fat meat and poultry  · Eggs  · Beans, peas  · Tofu  · Unsalted nuts and seeds  Choose less high-fat and red meat.   Source: USDA MyPlate, www.choosemyplate.gov  Know your limits on oils (fats) and sugars:  · Your allowance for oils is 22 grams or 5 teaspoons a day (oil includes vegetable oil, mayonnaise, soft margarine, salad dressing, nuts, olives, avocados, and some fish).  · Limit the extras (solid fats and sugars, also called "empty calories") to 130 calories a day.  · Cut back on salt " (sodium). Stay under 2,300 mg sodium a day. If you have a health condition such as heart disease or high blood pressure, your doctor will likely tell you to limit sodium to no more than 1,500 mg a day.  Get moving and be active!  Aim for at least 30 minutes of physical activity most days of the week or 150 minutes of exercise a week.  MyPlate Servings Worksheet: 1,600 calories  This worksheet tells you how many servings you should get each day from each food group, and tells you how much food makes a serving. Use this as a guide as you plan your meals throughout the day. Track your progress daily by writing in what you actually ate.  Food Group  Daily MyPlate Goal What You Ate Today   Vegetables 4 Half-cups or 4 Servings  One serving is:  ½ cup cut-up raw or cooked vegetables  1 cup raw, leafy vegetables  ½ baked sweet potato  ½ cup vegetable juice  Note: At meals, fill half your plate with vegetables and fruit.     Fruits 3 Half-cups or 3 Servings  One serving is:  ½ cup fresh, frozen, or canned fruit  1 medium piece of fruit  1 cup of berries or melon  ½ cup dried fruit  ½ cup 100% fruit juice  Note: Make most choices fruit instead of juice.     Grains 5 Servings or 5 Ounces  One serving is:  1 slice bread  1 cup dry cereal  ½ cup cooked rice, pasta, or cereal  1 5-inch tortilla  Note: Choose whole grains for at least half of your servings each day.     Dairy 3 Servings or 3 Cups  One serving is:  1 cup milk  1½ ounces reduced-fat hard cheese  2 ounces processed cheese  1 cup low-fat yogurt  1/3 cup shredded cheese  Note: Choose low-fat or fat-free most often.     Protein 5 Servings or 5 Ounces  One serving is:  1 ounce cooked lean beef, pork, lamb, or ham  1 ounce cooked chicken or turkey (no skin)  1 ounce cooked fish or shellfish (not fried)  1 egg  ¼ cup egg substitute  ½ ounce nuts or seeds  1 tablespoon peanut or almond butter  ¼ cup cooked dry beans or peas  ½ cup tofu  2 tablespoons hummus     Date Last  Reviewed: 6/1/2015  © 1420-4125 [employer]. [address] [place] [address], [place] [address]. All rights reserved. This information is not intended as a substitute for professional medical care. Always follow your healthcare professional's instructions.        Goal blood pressure is persistently less than 130/80    Fasting blood sugar goal less 100  Blood sugar goal 2 hours after eating is less than 160

## 2020-01-24 NOTE — PROGRESS NOTES
"Subjective:   Patient ID: Caitlyn Perez is a 54 y.o. female  Chief complaint:   Chief Complaint   Patient presents with    Annual Exam       HPI    Pt here for annual exam and f/u of HTN   Pcp: Dr. Delacruz     - reviewed recent labs with pt today     Requesting rolator and pt/ot for left UE and shoulder to help with strengthening   Hx of CVA 2/2 ruptured cerebral aneurysm with PN and L UE and L LE weakness - given GPN for paraesthesias in past which makes her groggy - she is no longer taking this   - has residual left sided UE and LE weakness and L UE spasticity   - rides bike but reports would like to resume trial of PT to help with strength  - Seen by PMR in past for spasticity - she would like to hold off on f/u for now and try PT again     HTN:   - Home bp range: checking inconsistently and no log to review  - taking amlodipine 10mg, irbesartan 150mg daliy   - will let us know when able to start dig htn program later this year as planning to start in 1 month when income tax check returns      IFG:   a1c 5.9 <- 5.7%  Is now exercising 4-5 times per week!    NAFLD eval 2/2 elevated liver enzymes - seen by hep and dx with this in past 6/2018- not on statin   No etoh use     Hyperparathyroidism: seen by endocrine 1/2018 and due for f/u   - taking calcium and vit d suppl qod   - osteopenia on last dexa      Vit d def: taking 600u daily - level 39      Osteopenia: taking vit d as above   dexa 1/2018 - osteopenia of hips     Review of Systems    Objective:  Vitals:    01/24/20 1234   BP: 116/86   Pulse: 94   SpO2: 98%   Weight: 94.9 kg (209 lb 3.5 oz)   Height: 5' 4" (1.626 m)     Body mass index is 35.91 kg/m².    Physical Exam   Constitutional: She is oriented to person, place, and time. She appears well-developed and well-nourished.   HENT:   Head: Normocephalic and atraumatic.   Right Ear: External ear normal.   Left Ear: External ear normal.   Nose: Nose normal.   Mouth/Throat: Oropharynx is clear and moist. No " oropharyngeal exudate.   No carotid bruits   Eyes: Conjunctivae and EOM are normal.   Neck: Neck supple. No thyromegaly present.   Cardiovascular: Normal rate, regular rhythm, normal heart sounds and intact distal pulses.   Pulmonary/Chest: Effort normal and breath sounds normal.   Abdominal: Soft. Bowel sounds are normal.   Musculoskeletal: She exhibits no edema or tenderness.   Lymphadenopathy:     She has no cervical adenopathy.   Neurological: She is alert and oriented to person, place, and time.   Left UE and LE weakness   Left UE spasticity   Skin: Skin is warm and dry.   Psychiatric: Her behavior is normal. Thought content normal.   Vitals reviewed.    Assessment:  1. Vitamin D deficiency    2. Spastic hemiparesis    3. Osteopenia, unspecified location    4. IGT (impaired glucose tolerance)    5. Mixed hyperlipidemia    6. History of CVA with residual deficit    7. Hyperparathyroidism    8. Family history of malignant neoplasm of breast    9. Essential hypertension    10. Abnormality of gait    11. At high risk for breast cancer    12. Fatty liver    13. Screening for colon cancer    14. Severe obesity (BMI 35.0-39.9) with comorbidity        Plan:  Caitlyn was seen today for annual exam.    Diagnoses and all orders for this visit:    Vitamin D deficiency  Stable, continue supplement    Spastic hemiparesis  -     walker Misc; 1 each by Misc.(Non-Drug; Combo Route) route daily as needed.  -     Ambulatory Referral to Physical/Occupational Therapy  Referred to PT and Rollator walker order signed    Osteopenia, unspecified location  Continue vitamin-D and stop calcium supplements due to hyperparathyroidism and hypercalcemia   Follow-up with endocrine    IGT (impaired glucose tolerance)  Continue with exercise to promote weight loss, review diet modification    hyperlipidemia  Reviewed levels in stable  Recommend Mediterranean diet    History of CVA with residual deficit  -     walker Misc; 1 each by  Misc.(Non-Drug; Combo Route) route daily as needed.  Stable    Hyperparathyroidism  -     Ambulatory referral/consult to Endocrinology; Future  -     Comprehensive metabolic panel; Future  As above  Stat calcium supplements and follow up with endocrine    Essential hypertension  -     [facility]  -     Hypertension Digital Medicine ([facility]): Assign Onboarding Questionnaires  Enteral in digital hypertension program  Continue current meds  Recommend low-salt diet and graded exercise program as tolerated to promote weight loss    Abnormality of gait  -     walker Misc; 1 each by Misc.(Non-Drug; Combo Route) route daily as needed.    Mammogram ordered and scheduled    Fatty liver  -     Ambulatory Referral to Hepatology  -     Comprehensive metabolic panel; Future  Continue with weight loss    Screening for colon cancer  -     Case request GI: COLONOSCOPY    Severe obesity (BMI 35.0-39.9) with comorbidity  - cont diet and exercise  - increase intensity and duration of CV exercise to continue weight loss  - goal wt loss one pound per week  - portion control, healthy choices    Other orders  -     Influenza - Quadrivalent (6 months+) (PF)  -     irbesartan (AVAPRO) 150 MG tablet; Take 1 tablet (150 mg total) by mouth every evening.  -     amLODIPine (NORVASC) 10 MG tablet; TAKE 1 TABLET(10 MG) BY MOUTH EVERY DAY    Just donated blood this month prior to annual labs     Health Maintenance   Topic Date Due    High Dose Statin  07/12/1985    Mammogram  02/08/2020    Colonoscopy  01/25/2020    Lipid Panel  01/21/2021    Pap Smear with HPV Cotest  02/11/2022    TETANUS VACCINE  12/27/2026    Hepatitis C Screening  Completed    Pneumococcal Vaccine (Medium Risk)  Completed

## 2020-01-29 ENCOUNTER — TELEPHONE (OUTPATIENT)
Dept: HEPATOLOGY | Facility: CLINIC | Age: 55
End: 2020-01-29

## 2020-01-31 DIAGNOSIS — Z12.11 SPECIAL SCREENING FOR MALIGNANT NEOPLASMS, COLON: Primary | ICD-10-CM

## 2020-01-31 RX ORDER — POLYETHYLENE GLYCOL 3350, SODIUM SULFATE ANHYDROUS, SODIUM BICARBONATE, SODIUM CHLORIDE, POTASSIUM CHLORIDE 236; 22.74; 6.74; 5.86; 2.97 G/4L; G/4L; G/4L; G/4L; G/4L
4 POWDER, FOR SOLUTION ORAL ONCE
Qty: 4000 ML | Refills: 0 | Status: SHIPPED | OUTPATIENT
Start: 2020-01-31 | End: 2020-01-31

## 2020-01-31 NOTE — PROGRESS NOTES
"Patient was given vaccine information sheet for the Flu Vaccine. The area of injection was palpated using the acromion process as a landmark. This area was cleaned with alcohol. Using a 25g 1" safety needle, 0.5mL of the vaccine was placed into the left deltoid muscle. The injection site was dressed with a bandage. Patient experienced no complications and was discharged in stable condition. Fluarix vaccine Lot: [id number] Exp: 6/30/2020    "

## 2020-02-04 DIAGNOSIS — R26.9 ABNORMALITY OF GAIT: ICD-10-CM

## 2020-02-04 DIAGNOSIS — G62.9 PERIPHERAL POLYNEUROPATHY: ICD-10-CM

## 2020-02-04 DIAGNOSIS — I69.30 HISTORY OF CVA WITH RESIDUAL DEFICIT: ICD-10-CM

## 2020-02-04 RX ORDER — LIDOCAINE 50 MG/G
1 PATCH TOPICAL DAILY PRN
Qty: 30 PATCH | Refills: 1 | Status: SHIPPED | OUTPATIENT
Start: 2020-02-04 | End: 2020-06-16 | Stop reason: SDUPTHER

## 2020-02-04 RX ORDER — POLYETHYLENE GLYCOL-3350 AND ELECTROLYTES 236; 6.74; 5.86; 2.97; 22.74 G/274.31G; G/274.31G; G/274.31G; G/274.31G; G/274.31G
POWDER, FOR SOLUTION ORAL
COMMUNITY
Start: 2020-01-31

## 2020-02-04 NOTE — TELEPHONE ENCOUNTER
Please send pt Rx for Lidocaine 5% Patches to [facility] for a PA. Pt last seen 1/14/2020 by Dr. Mas.

## 2020-02-10 ENCOUNTER — HOSPITAL ENCOUNTER (OUTPATIENT)
Dept: RADIOLOGY | Facility: OTHER | Age: 55
Discharge: HOME | End: 2020-02-10
Attending: INTERNAL MEDICINE
Payer: MEDICARE

## 2020-02-10 DIAGNOSIS — Z12.31 ENCOUNTER FOR SCREENING MAMMOGRAM FOR BREAST CANCER: ICD-10-CM

## 2020-02-10 PROCEDURE — 77067 SCR MAMMO BI INCL CAD: CPT | Mod: TC

## 2020-02-10 PROCEDURE — 77063 MAMMO DIGITAL SCREENING BILAT WITH TOMOSYNTHESIS_CAD: ICD-10-PCS | Mod: 26,,, | Performed by: RADIOLOGY

## 2020-02-10 PROCEDURE — 77067 SCR MAMMO BI INCL CAD: CPT | Mod: 26,,, | Performed by: RADIOLOGY

## 2020-02-10 PROCEDURE — 77063 BREAST TOMOSYNTHESIS BI: CPT | Mod: 26,,, | Performed by: RADIOLOGY

## 2020-02-10 PROCEDURE — 77067 MAMMO DIGITAL SCREENING BILAT WITH TOMOSYNTHESIS_CAD: ICD-10-PCS | Mod: 26,,, | Performed by: RADIOLOGY

## 2020-02-18 ENCOUNTER — CLINICAL SUPPORT (OUTPATIENT)
Dept: REHABILITATION | Facility: HOSPITAL | Age: 55
End: 2020-02-18
Attending: INTERNAL MEDICINE
Payer: MEDICARE

## 2020-02-18 ENCOUNTER — TELEPHONE (OUTPATIENT)
Dept: HEPATOLOGY | Facility: CLINIC | Age: 55
End: 2020-02-18

## 2020-02-18 ENCOUNTER — TELEPHONE (OUTPATIENT)
Dept: INTERNAL MEDICINE | Facility: CLINIC | Age: 55
End: 2020-02-18

## 2020-02-18 ENCOUNTER — PATIENT MESSAGE (OUTPATIENT)
Dept: INTERNAL MEDICINE | Facility: CLINIC | Age: 55
End: 2020-02-18

## 2020-02-18 DIAGNOSIS — G81.10 SPASTIC HEMIPARESIS: Primary | ICD-10-CM

## 2020-02-18 DIAGNOSIS — I69.398 LACK OF COORDINATION AS LATE EFFECT OF CEREBROVASCULAR ACCIDENT (CVA): ICD-10-CM

## 2020-02-18 DIAGNOSIS — R68.89 IMPAIRED FUNCTION OF UPPER EXTREMITY: ICD-10-CM

## 2020-02-18 DIAGNOSIS — R26.9 GAIT ABNORMALITY: Primary | ICD-10-CM

## 2020-02-18 DIAGNOSIS — G81.10 SPASTIC HEMIPARESIS: ICD-10-CM

## 2020-02-18 DIAGNOSIS — Z91.81 AT HIGH RISK FOR FALLS: ICD-10-CM

## 2020-02-18 DIAGNOSIS — R27.9 LACK OF COORDINATION AS LATE EFFECT OF CEREBROVASCULAR ACCIDENT (CVA): ICD-10-CM

## 2020-02-18 PROCEDURE — 97165 OT EVAL LOW COMPLEX 30 MIN: CPT | Mod: PN

## 2020-02-18 NOTE — TELEPHONE ENCOUNTER
Tiffanie with occupational therapy requested a new order for physical therapy r/t gait training so that pt will not run into any problems with OT. Sent message to Dr. Isaacs.

## 2020-02-18 NOTE — PROGRESS NOTES
Occupational Therapy   Evaluation    Caitlyn Perez   MRN: [id number]     OT eval complete. Please see attached POC for details. Thank you for consult!    MARGARITO Wood, DELGADO  2/18/2020

## 2020-02-18 NOTE — TELEPHONE ENCOUNTER
----- Message from Gloria Lozano sent at 2/18/2020 10:48 AM CST -----  Contact: Tiffanie Valencia ([facility])  Name of Who is Calling: Tiffanie Valencia ([facility])    What is the request in detail: Would like to speak to staff in regards to the order that was sent in for physical therapy and wants to know what specifically they want her to work on. Please advise.     Can the clinic reply by [facility]: No    What Number to [facility]: cell: [phone]; : [phone]

## 2020-02-20 NOTE — TELEPHONE ENCOUNTER
Internal pt referral denied as [facility] not accept ph[facility]  - signed new referral as external - please fax to number below

## 2020-02-21 ENCOUNTER — LAB VISIT (OUTPATIENT)
Dept: LAB | Facility: OTHER | Age: 55
End: 2020-02-21
Attending: INTERNAL MEDICINE
Payer: MEDICARE

## 2020-02-21 DIAGNOSIS — E21.3 HYPERPARATHYROIDISM: ICD-10-CM

## 2020-02-21 DIAGNOSIS — K76.0 FATTY LIVER: ICD-10-CM

## 2020-02-21 LAB
ALBUMIN SERPL BCP-MCNC: 4 G/DL (ref 3.5–5.2)
ALP SERPL-CCNC: 126 U/L (ref 55–135)
ALT SERPL W/O P-5'-P-CCNC: 37 U/L (ref 10–44)
ANION GAP SERPL CALC-SCNC: 9 MMOL/L (ref 8–16)
AST SERPL-CCNC: 32 U/L (ref 10–40)
BILIRUB SERPL-MCNC: 0.3 MG/DL (ref 0.1–1)
BUN SERPL-MCNC: 11 MG/DL (ref 6–20)
CALCIUM SERPL-MCNC: 10.2 MG/DL (ref 8.7–10.5)
CHLORIDE SERPL-SCNC: 105 MMOL/L (ref 95–110)
CO2 SERPL-SCNC: 28 MMOL/L (ref 23–29)
CREAT SERPL-MCNC: 1.1 MG/DL (ref 0.5–1.4)
EST. GFR  (AFRICAN AMERICAN): >60 ML/MIN/1.73 M^2
EST. GFR  (NON AFRICAN AMERICAN): 57 ML/MIN/1.73 M^2
GLUCOSE SERPL-MCNC: 171 MG/DL (ref 70–110)
POTASSIUM SERPL-SCNC: 3.8 MMOL/L (ref 3.5–5.1)
PROT SERPL-MCNC: 7.3 G/DL (ref 6–8.4)
SODIUM SERPL-SCNC: 142 MMOL/L (ref 136–145)

## 2020-02-21 PROCEDURE — 80053 COMPREHEN METABOLIC PANEL: CPT

## 2020-02-21 PROCEDURE — 36415 COLL VENOUS BLD VENIPUNCTURE: CPT

## 2020-02-24 PROBLEM — R27.9 LACK OF COORDINATION AS LATE EFFECT OF CEREBROVASCULAR ACCIDENT (CVA): Status: ACTIVE | Noted: 2020-02-18

## 2020-02-24 PROBLEM — I69.398 LACK OF COORDINATION AS LATE EFFECT OF CEREBROVASCULAR ACCIDENT (CVA): Status: ACTIVE | Noted: 2020-02-18

## 2020-02-24 PROBLEM — R68.89 IMPAIRED FUNCTION OF UPPER EXTREMITY: Status: ACTIVE | Noted: 2020-02-18

## 2020-02-25 NOTE — PLAN OF CARE
"  [facility]  Initial Neurological Evaluation     Date: 2/18/2020  Patient: Caitlyn Perez  Chart Number: [id number]    Therapy Diagnosis:   Encounter Diagnoses   Name Primary?    Spastic hemiparesis Yes    Impaired function of upper extremity     Lack of coordination as late effect of cerebrovascular accident (CVA)      Physician: Lizeth Isaacs MD    Physician Orders: Eval and Treat  Medical Diagnosis: spastic hemiparesis  Evaluation Date: 2/18/2020  Plan of Care Expiration Period: 2/18/2020-5/12/2020  Insurance Authorization period Expiration: 4/18/2020  Date of Return to MD: tba  Visit # / Visits Authorized: 1 / 12  FOTO: did not assess     Time In: 1115  Time Out: 1215  Total Billable (one on one) Time: 60 minutes    Precautions: Standard and Fall, borderline diabetes    Subjective     History of Current Condition: Pt w/ CVA d/t aneurysm about 10 years ago. I've been working on doing as much as I can, but then they cancelled the gym and the only one I can go to is forever away from me and it's hard to get a ride." She feels like she has been losing functional independence since [facility] cancelled the membership to the gym that was close to where she lives.    Involved Side: Left  Dominant Side: Right  Date of Onset: 10 years ago  Surgical Procedure: pt unsure  Imaging: no relevant imaging available   Previous Therapy: Acute care and SNF therapies at time of initial CVA (noted in prior therapy documentation, pt unable to specify prior therapies). Outpatient Therapy in [id number]    Patient's Goals for Therapy: "I'd like to be able to move my hand more natural and bend it down a little more and be able to turn a knob better. I'd like to be able to sweep better. I really want to be able to open my hand to scrub the table without fighting with it. I want to be able to make my bed. I hate an unmade bed."    Pain:  Pain Related Behaviors Observed: no   Functional Pain Scale " "Rating 0-10:   Pain in the L foot. "it feels a thousand ants are biting me all day." She was taking gabapentin, but didn't like how it made her so sleepy.  Rubbing the foot makes it better.     Occupation:    Working presently: no. She last worked the day before her stroke. At that time, she was a manager at [employer].    Functional Limitations/Social History:    Prior Level of Function: kids do her laundry, she tries to fold it with one hand.   Current Level of Function: Current level of function is similar to PLOF, though she does report a progressive decline in function since she's unable to easily access the gym at this point. She c/o pain in shoulder with movement. "it's all just so stiff. This finger (index), is starting to move now." She requires ssist for buttoning pants, bra, threading left arm, tying shoes. "Some days I can do everything, but some days I can't do anything but lay there."    Home/Living environment : Pt in her own home, but 5 of her 6 kids (2 in their 30's, 2 in their 20's, twins age 18) live next door.  Home Access: SSH, 4 ELKIN (no difficulty accessing steps - she turns backward to go up as handrail only on one side.) She is looking for a place out here in [place].    DME: rollator ordered, hinged AFO ordered. She has a grab bar in the shower but stands to shower.     Leisure: playing with grandkids (ages 12, 11, 10, 6, 3); gambling at the [email or web address], eating out, taking grandkids to skating rink (though she doesn't skate), taking the kids to the park (she likes to watch the birds).      Driving: Caitlyn does not drive.    Past Medical History/Physical Systems Review:     Past Medical History:  Caitlyn Perez  has a past medical history of Abnormal Pap smear of cervix, Aneurysm, Family history of breast cancer, Hypertension, Parathyroid disease, and Stroke.    Past Surgical History:  Caitlyn Perez  has a past surgical history that includes  section; Tubal ligation; Colonoscopy (N/A, " 2/18/2016); and Colonoscopy (N/A, 1/25/2017).    Current Medications:  Caitlyn has a current medication list which includes the following prescription(s): amlodipine, aspirin, blood sugar diagnostic, cyanocobalamin (vitamin b-12), gavilyte-g, irbesartan, krill oil, lidocaine, lutein, folic acid/multivit-min/lutein, ondansetron, and walker.    Allergies:  Review of patient's allergies indicates:  No Known Allergies     Objective     Cognitive Exam: Caitlyn is grossly oriented, alert and cooperative. She has memory deficits and is unable to describe the therapies she has received in the past. She has some difficulty processing conflicting information. However, she was pleasant and cooperative throughout the eval session today. Some word-finding difficulties.     Visual/Perceptual: Caitlyn does not have significant visual complaints. To be assessed as necessary.     Physical Exam:  Skin integrity: visible skin intact  Edema: no obvious edema     L scap is elevated, laterally rotated & abducted (4 fingers) compared to the R. Min winging.  Increased shoulder shrug during shoulder flexion.    AROM scap movement in all directions. No subluxation noted.   R UE AROM WFL    L UE sh flex=72s w/ elbow flexion & pronation; 124p  Abd=68a w/ elbow flexion & pronation; 98p  Elbow flex=120a; min decr  Elbow ext=-35a; -22p  Wrist ext=-24a w/ clawing of fingers  Supination to neutral AROM; PROM min decreased  Active thumb movement for thumbs up and palmar abduction, opposition to base of ring finger  Active extension of digits, but inconsistent  Shortening of finger flexors.    L UE: PROM ER@0*abd=5, ER@45*abd=7, ER@90*abd=3; SLIR=69     Strength,  & pinch, & FMC testing deferred.     Gross motor coordination:   MEGAN (Rapid Alternating Movements): L UE impaired    FTHUE (Functional Test for the Hemiparetic Upper Extremity): 5/7  Level Task +/- Time/Notes   1 Pt is unable to complete higher level tasks     2 A. Associated Reaction + NT  "   B. Hand into lap + 2.93s   3 C. Arm clearance during shirt tuck + NT    D. Hold a pouch + (15s)    E. Stabilize a pillow + 11.43s   4 F. Stabilize a jar + 14.80s (L palm up)  6.85s (After ed for thumb up)    G. Stabilize a package + 47.91s    H. Wringing a rag + NT   5 I. Hold a pan lid + 20.60s    J. Hook and zip a zipper + 27.69s    K. Fold a sheet + 62.6s   6 L. Blocks and Box - Unable to complete due to decreased elbow extension for reaching.    M. Box on shelf      N. Parnell in coin gauge     7 O. Cat's cradle      P. Light Bulb      Q. Remove Rubber Band         Tone:  Modified Adriane Scale:   2 More marked increase in muscle tone through most of the ROM, but affected part(s) easily moved    Sensation:  Caitlyn  reports "And now since you've been moving me, the ants are biting me."  Light touch: L UE impaired, delayed, decreased localization  She states that when she was regularly going to the gym, she was not having the paraesthesias she currently has.     Balance:   Static Sit - GOOD: Takes MODERATE challenges from all directions  Dynamic sit- GOOD: Takes MODERATE challenges from all directions  Static Stand - FAIR+: Able to take MINIMAL challenges from all directions  Dynamic stand - FAIR: Maintains without assist, but unable to take any challenges     Endurance Deficit: moderate                    Functional Status      Functional Mobility:  SBA with increased time and effort for amb waiting room<>OT gym.   Mod I bed mobility.   Mod I sit<>stand.    ADL's:  Assist with FMC, joseluis for bimanual FMC. See comments above in 'clof'     IADL's:  Caitlyn has limited participation in IADLs due to decreased L UE function. She would like to be able to do more at home.     Treatment     Home Exercises and Patient Education Provided    Education provided:   -role of OT, goals for OT, scheduling/cancellations, insurance limitations with patient.  -Additional Education provided: stretching the L UE; consistently attempt " to incorporate the L UE into function    Written Home Exercises Provided: no - will address as next session.  Exercises were reviewed and Caitlyn was able to demonstrate them prior to the end of the session.    Caitlyn demonstrated fair  understanding of the education provided.     Assessment     Caitlyn Perez is a 54 y.o. female referred to outpatient occupational therapy and presents with a medical diagnosis of spastic hemiparesis, resulting in decreased flexibility, decreased range of motion, decreased muscle strength, impaired function and decreased work ability and demonstrates limitations as described in the chart below. Following medical record review it is determined that pt will benefit from occupational therapy services in order to maximize pain free and/or functional use of the L UE to increase independence and efficiency with all her desired tasks.    Pt prognosis is Fair due to 10 years since initial onset, though pt does report being motivated; increased independence with tasks prior to cancellation of gym policy.  Pt will benefit from skilled outpatient Occupational Therapy to address the deficits stated above and in the chart below, provide pt/family education, and to maximize pt's level of independence.     Plan of care discussed with patient: Yes  Pt's spiritual, cultural and educational needs considered and patient is agreeable to the plan of care and goals as stated below:     Anticipated Barriers for therapy: possible transportation concerns, 10 years since initial onset of symptoms    Medical Necessity is demonstrated by the following  Profile and History Assessment of Occupational Performance Level of Clinical Decision Making Complexity Score   Occupational Profile:   Caitlyn Perez is a 54 y.o. female who lives alone (family next door) and is currently medically retired/disabled. Caitlyn Perez has difficulty with  dressing  housework/household chores  affecting his/her daily functional  abilities. His/her main goal for therapy is to improve fxl use of the L UE to increase her ability to take care of her home.     Comorbidities:   HTN    Medical and Therapy History Review:   Brief               Performance Deficits    Physical:  Joint Mobility  Joint Stability  Muscle Power/Strength  Muscle Endurance  Control of Voluntary Movement   Strength  Pinch Strength  Gross Motor Coordination  Fine Motor Coordination  Muscle Tone    Cognitive:  Attention  Memory    Psychosocial:    Habits  Routines     Clinical Decision Making:  low    Assessment Process:  Problem-Focused Assessments    Modification/Need for Assistance:  Not Necessary    Intervention Selection:  Several Treatment Options       low  Based on PMHX, co morbidities , data from assessments and functional level of assistance required with task and clinical presentation directly impacting function.       The following goals were discussed with the patient and patient is in agreement with them as to be addressed in the treatment plan.     Goals:  Short Term Goals: 4 weeks   1) Caitlyn will be mod I with initial HEP.  2) Caitlyn will demo improved PROM ER of L shoulder by at least 10* at each of 3 positions supine.  3) Caitlyn will demo elbow extension -30 or better during L UE reaching.    Long Term Goals: 12 weeks   1) Caitlyn will be able to make her bed with shaun UE and modified techniques as necessary.  2) Caitlyn will be able to sweep her floor with modified techniques as necessary.  3) Caitlyn will demo improved L UE function, as evidenced by a FTHUE score of 6/7.  4) Caitlyn will be mod I with d/c HEP.  5) Caitlyn will demo elbow extension -15 or better AROM during reaching.  6) Caitlyn will demo PROM ER of L shoulder of at least 45* at each of 3 positions supine for increased fxl use of the L shoulder.    Plan   Certification Period/Plan of care expiration: 2/18/2020 to 5/12/2020.    Outpatient Occupational Therapy 2 times weekly for 12 weeks to  include the following interventions: Electrical Stimulation, Therapeutic Taping, Fluidotherapy, Manual Therapy, Moist Heat/ Ice, Neuromuscular Re-ed, Orthotic Management and Training, Paraffin, Patient Education, Self Care, Therapeutic Activites and Therapeutic Exercise.    Myranda Valencia OT      I certify the need for these services furnished under this plan of treatment and while under my care.  ____________________________________ Physician/Referring Practitioner   Date of Signature

## 2020-02-26 ENCOUNTER — PATIENT MESSAGE (OUTPATIENT)
Dept: INTERNAL MEDICINE | Facility: CLINIC | Age: 55
End: 2020-02-26

## 2020-02-26 ENCOUNTER — CLINICAL SUPPORT (OUTPATIENT)
Dept: REHABILITATION | Facility: HOSPITAL | Age: 55
End: 2020-02-26
Attending: INTERNAL MEDICINE
Payer: MEDICARE

## 2020-02-26 DIAGNOSIS — R27.9 LACK OF COORDINATION AS LATE EFFECT OF CEREBROVASCULAR ACCIDENT (CVA): ICD-10-CM

## 2020-02-26 DIAGNOSIS — R68.89 IMPAIRED FUNCTION OF UPPER EXTREMITY: ICD-10-CM

## 2020-02-26 DIAGNOSIS — I69.398 LACK OF COORDINATION AS LATE EFFECT OF CEREBROVASCULAR ACCIDENT (CVA): ICD-10-CM

## 2020-02-26 PROCEDURE — 97112 NEUROMUSCULAR REEDUCATION: CPT | Mod: PN

## 2020-02-26 PROCEDURE — 97110 THERAPEUTIC EXERCISES: CPT | Mod: PN

## 2020-02-26 NOTE — PROGRESS NOTES
"  Occupational Therapy Daily Treatment Note     Date: 2/26/2020  Name: Caitlyn Perez  Clinic Number: [id number]    Therapy Diagnosis:   Encounter Diagnoses   Name Primary?   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 She hit her L knee & hand, and says she hit her head. She did not seek medical attention, but did send a note to the doctor via e-mail. She says she feels fine.  Resting hand orthosis is now cracked due to accident with grandson. She was compliant with home exercise program given last session for stretching the hand.   Response to previous treatment:no concerns  Functional change: nothing specific noted.    Pain: 0/10  Location:  N/a: no c/o pain today.     Objective     Caitlyn received the following manual therapy techniques for 5 minutes:   - Gentle mobilization of joints involving L UE phalanges, metacarpals, carpals, radius and ulna.     aCitlyn received therapeutic exercises for 30 minutes including:  - Caitlyn demo'd stretch for HEP, but produced significantly more MCP extension than wrist extension. OT attempted to reposition pt's R hand to increase wrist extension and decrease MCP extension, but she still ended up in MCP hyperextension.  - Gentle stretching of the soft tissues of the L wrist and hand to decrease edema and improve PROM, potentially allowing for increases in active movement. "   - Gentle mobilization and stretching of soft-tissues throughout the shoulder, including levator, serratus anterior, subscapularis, anterior complex, lats and combined internal rotators to allow for increased PROM.  - supine stretching to L shoulder external rotation at 90*abd as tolerated, ~8 x30s each.  - supine AAROM L shoulder ER @ 90* abd.  - elbow extension AROM supine w/ arm supported at 90* flexion. CGA provided throughout due to decreased external rotation.     Caitlyn participated in dynamic functional therapeutic activities to improve functional performance for 0  minutes, including:  -n/a    Caitlyn participated in neuromuscular re-education activities to improve: Balance, Coordination, Kinesthetic Sense and Posture for 20 minutes. The following activities were included:  -Placed flat paddle on pt's hand for duration of treatment to inhibit flexor tone and provide LLPS to long finger flexors.  -Facilitation of a/p pelvic tilt, R/L lateral pelvic tilt. Points of contact at L hand and occasionally at L QL for R lateral tilting. Hands at sides in position of support to facilitate scapulo-pelvic rhythm.  -Added 70* docking station to inhibit over-recruitment of flexors and provide LLPS to wrist and long finger flexors.   -at end of session, removed paddle and docking station and facilitated active extension of wrist/hand/digits.    Home Exercises and Education Provided     Education provided:   - pelvic tilting for HEP  - continue stretching wrist/hand/digits to extension, but try not to hyperextend MCPs  - Progress towards goals     Written Home Exercises Provided: yes.  Exercises were reviewed and Caitlyn was able to demonstrate them prior to the end of the session.  Caitlyn demonstrated good  understanding of the HEP provided.   .   See EMR under Patient Instructions for exercises provided 2/26/2020.        Assessment     Pt would continue to benefit from skilled OT. She has been stretching and continues to  express the desire to improve functional independence with all tasks, so she doesn't have to rely on her family all the time. We were able to increase PROM shoulder external rotation today compared to eval, though it was not specifically measured.    Caitlyn is progressing well towards her goals and there are no updates to goals at this time. Pt prognosis is Fair.    Pt will continue to benefit from skilled outpatient occupational therapy to address the deficits listed in the problem list on initial evaluation provide pt/family education and to maximize pt's level of independence in the home and community environment.     Anticipated barriers to occupational therapy: possible transportation concerns, 10 years since initial onset of symptoms    Pt's spiritual, cultural and educational needs considered and pt agreeable to plan of care and goals.    Goals:  Short Term Goals: 4 weeks   1) Caitlyn will be mod I with initial HEP. (progressing 2/26/2020)  2) Caitlyn will demo improved PROM ER of L shoulder by at least 10* at each of 3 positions supine. (progressing 2/26/2020)  3) Caitlyn will demo elbow extension -30 or better during L UE reaching. (progressing 2/26/2020)     Long Term Goals: 12 weeks   1) Caitlyn will be able to make her bed with shaun UE and modified techniques as necessary. (progressing 2/26/2020)  2) Caitlyn will be able to sweep her floor with modified techniques as necessary. (progressing 2/26/2020)  3) Caitlyn will demo improved L UE function, as evidenced by a FTHUE score of 6/7. (progressing 2/26/2020)  4) Caitlyn will be mod I with d/c HEP. (progressing 2/26/2020)  5) Caitlyn will demo elbow extension -15 or better AROM during reaching. (progressing 2/26/2020)  6) Caitlyn will demo PROM ER of L shoulder of at least 45* at each of 3 positions supine for increased fxl use of the L shoulder. (progressing 2/26/2020)    Plan     Updates/Grading for next session: review pelvic tilting; progress stretching of  shoulder to ER as tolerated; consider flat paddle for home use.    Myranda Valencia, OT

## 2020-02-26 NOTE — PATIENT INSTRUCTIONS
Sit on a hard surface and position your hands at your sides. Try and keep both hands flat on the surface at your side.  (Elevate them if necessary using yoga blocks or shoe boxes stuffed with a pillow or blanket and taped up. Place a piece of  under and over the box/block to make sure it doesnt slip.)     Anterior and Posterior Pelvic Tilt  Roll your pelvis forward so that you sit up tall (anterior tilt) then roll your pelvis backward so youre slouched (posterior tilt). Make your movements as big as possible. Repeat about 20 times. Do 5 sets throughout the day.                                                Lateral Pelvic Tilt  Sitting up tall with an anterior pelvic tilt, lift your right hip up toward your right armpit (DO NOT lean way over to the side). Then return to neutral. Lift the left hip up toward your left armpit. Repeat about 20 times. Do 5 sets throughout the day.

## 2020-02-27 ENCOUNTER — CLINICAL SUPPORT (OUTPATIENT)
Dept: REHABILITATION | Facility: HOSPITAL | Age: 55
End: 2020-02-27
Attending: INTERNAL MEDICINE
Payer: MEDICARE

## 2020-02-27 DIAGNOSIS — R68.89 IMPAIRED FUNCTION OF UPPER EXTREMITY: ICD-10-CM

## 2020-02-27 DIAGNOSIS — I69.398 LACK OF COORDINATION AS LATE EFFECT OF CEREBROVASCULAR ACCIDENT (CVA): ICD-10-CM

## 2020-02-27 DIAGNOSIS — R27.9 LACK OF COORDINATION AS LATE EFFECT OF CEREBROVASCULAR ACCIDENT (CVA): ICD-10-CM

## 2020-02-27 PROCEDURE — 97112 NEUROMUSCULAR REEDUCATION: CPT | Mod: PN

## 2020-02-27 PROCEDURE — 97110 THERAPEUTIC EXERCISES: CPT | Mod: PN

## 2020-02-27 NOTE — PROGRESS NOTES
"  Occupational Therapy Daily Treatment Note     Date: 2/27/2020  Name: Caitlyn Perez  Clinic Number: [id number]    Therapy Diagnosis:   Encounter Diagnoses   Name Primary?   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 I had a good rest." She was compliant with home exercise program given last session for pelvic tilting exs, but had difficulty keeping the hand opened.   Response to previous treatment:no concerns  Functional change: nothing specific noted.    Pain: 0/10  Location:  N/a: no c/o pain today.     Objective     Caitlyn received the following manual therapy techniques for 5 minutes:   - Gentle mobilization of joints involving L UE phalanges, metacarpals, carpals, radius and ulna.     Caitlyn received therapeutic exercises for 30 minutes including:  - Gentle stretching of the soft tissues of the L wrist and hand to decrease edema and improve PROM, potentially allowing for increases in active movement.   - Gentle mobilization and stretching of soft-tissues throughout the shoulder, including levator, serratus anterior, subscapularis, anterior complex, lats and combined internal rotators to allow for increased PROM.  -UBE x5 min forward/ 5 min back w/ shaun UE, level 1.0 w/ paddle attachment for L UE, allowing for increased PROM throughout the L UE.      Caitlyn participated in dynamic functional therapeutic activities to improve functional performance for 0  minutes, " including:  -n/a    Caitlyn participated in neuromuscular re-education activities to improve: Balance, Coordination, Kinesthetic Sense and Posture for 20 minutes. The following activities were included:  -facilitation of forward reach AAROM at table top w/ encouragement to fully extend elbow.  -Placed flat paddle on pt's hand for duration of treatment to inhibit flexor tone and provide LLPS to long finger flexors.  -AROM a/p pelvic tilt, R/L lateral pelvic tilt. Points of contact at L QL for maximizing movement toward R lateral tilt. Hands at sides in position of support to facilitate scapulo-pelvic rhythm.  -Added 70* docking station to inhibit over-recruitment of flexors and provide LLPS to wrist and long finger flexors.   -at end of session, removed paddle and docking station and OT facilitated active extension of wrist/hand/digits.    Home Exercises and Education Provided     Education provided:   - continue pelvic tilting for HEP  - continue stretching wrist/hand/digits to extension, but try not to hyperextend MCPs  - Progress towards goals     Written Home Exercises Provided: Patient instructed to cont prior HEP.  Exercises were reviewed and Caitlyn was able to demonstrate them prior to the end of the session.  Caitlyn demonstrated good  understanding of the HEP provided.   .   See EMR under Patient Instructions for exercises provided 2/26/2020.        Assessment     Pt would continue to benefit from skilled OT. She enjoyed the feeling of increased movement in the L shoulder with the UBE and paddle attachment. Increased PROM and AROM in L UE wrist and digit extension noted at the end of today's session.     Caitlyn is progressing well towards her goals and there are no updates to goals at this time. Pt prognosis is Fair.    Pt will continue to benefit from skilled outpatient occupational therapy to address the deficits listed in the problem list on initial evaluation provide pt/family education and to maximize  pt's level of independence in the home and community environment.     Anticipated barriers to occupational therapy: possible transportation concerns, 10 years since initial onset of symptoms    Pt's spiritual, cultural and educational needs considered and pt agreeable to plan of care and goals.    Goals:  Short Term Goals: 4 weeks   1) Caitlyn will be mod I with initial HEP. (progressing 2/27/2020)  2) Caitlyn will demo improved PROM ER of L shoulder by at least 10* at each of 3 positions supine. (progressing 2/27/2020)  3) Caitlyn will demo elbow extension -30 or better during L UE reaching. (progressing 2/27/2020)     Long Term Goals: 12 weeks   1) Caitlyn will be able to make her bed with shaun UE and modified techniques as necessary. (progressing 2/27/2020)  2) Caitlyn will be able to sweep her floor with modified techniques as necessary. (progressing 2/27/2020)  3) Caitlyn will demo improved L UE function, as evidenced by a FTHUE score of 6/7. (progressing 2/27/2020)  4) Caitlyn will be mod I with d/c HEP. (progressing 2/27/2020)  5) Caitlyn will demo elbow extension -15 or better AROM during reaching. (progressing 2/27/2020)  6) Caitlyn will demo PROM ER of L shoulder of at least 45* at each of 3 positions supine for increased fxl use of the L shoulder. (progressing 2/27/2020)    Plan     Updates/Grading for next session: continue stretching, increase functional use of the L UE, joseluis as a support during standing activity.     Myranda Valencia, OT

## 2020-03-02 ENCOUNTER — CLINICAL SUPPORT (OUTPATIENT)
Dept: REHABILITATION | Facility: HOSPITAL | Age: 55
End: 2020-03-02
Attending: INTERNAL MEDICINE
Payer: MEDICARE

## 2020-03-02 DIAGNOSIS — R68.89 IMPAIRED FUNCTION OF UPPER EXTREMITY: ICD-10-CM

## 2020-03-02 DIAGNOSIS — I69.398 LACK OF COORDINATION AS LATE EFFECT OF CEREBROVASCULAR ACCIDENT (CVA): ICD-10-CM

## 2020-03-02 DIAGNOSIS — R27.9 LACK OF COORDINATION AS LATE EFFECT OF CEREBROVASCULAR ACCIDENT (CVA): ICD-10-CM

## 2020-03-02 PROCEDURE — 97110 THERAPEUTIC EXERCISES: CPT | Mod: PN

## 2020-03-02 PROCEDURE — 97112 NEUROMUSCULAR REEDUCATION: CPT | Mod: PN

## 2020-03-02 NOTE — PROGRESS NOTES
Occupational Therapy Daily Treatment Note     Date: 3/2/2020  Name: Caitlyn Perez  Clinic Number: [id number]    Therapy Diagnosis:   Encounter Diagnoses   Name Primary?   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   Response to previous treatment:no concerns from tx; she noted some soreness in the shoulder after trying to work shoulder at home on Saturday.  Functional change: nothing specific noted.    Pain: 0/10  Location:  N/a: no c/o pain today.     Objective     Caitlyn received the following manual therapy techniques for 5 minutes:   - Gentle mobilization of joints involving L UE phalanges, metacarpals, carpals, radius and ulna.     Caitlyn received therapeutic exercises for 30 minutes including:  - Gentle stretching of the soft tissues of the L wrist and hand to decrease edema and improve PROM, potentially allowing for increases in active movement.   - Gentle mobilization and stretching of soft-tissues throughout the shoulder, including levator, serratus anterior, subscapularis, anterior complex, lats and combined internal rotators to allow for increased PROM.  -shaun scap retraction 2x10 AROM  -shaun scap depression 2x10 AROM  -UBE x5 min forward/ 5 min back w/ shaun UE, level 1.0 w/ paddle attachment for L UE, allowing for increased PROM throughout the L UE. OT encouraged her to focus on keeping shaun scap depressed throughout UBE. She needed mod  "cues.    Caitlyn participated in dynamic functional therapeutic activities to improve functional performance for 0  minutes, including:  -n/a    Caitlyn participated in neuromuscular re-education activities to improve: Balance, Coordination, Kinesthetic Sense and Posture for 20 minutes. The following activities were included:  -KT to facilitate L wrist extensors (Y-cut, ~35% tension, applied O->I).   -AAROM wrist extension 2x5. Pt noted to be moving consistently to extension/ ulnar deviation.  -AAROM radial deviation 2x5  -Placed flat paddle on pt's hand for duration of treatment to inhibit flexor tone and provide LLPS to long finger flexors.  -AROM a/p pelvic tilt, R/L lateral pelvic tilt. Points of contact at L QL for maximizing movement toward R lateral tilt. Hands at sides in position of support to facilitate scapulo-pelvic rhythm.  -Added 70* docking station to inhibit over-recruitment of flexors and provide LLPS to wrist and long finger flexors.   -Blocked practice sit<>stand w/ R foot on 4" block and R hand in pocket x5. Repeated w/ R foot on 6" block. She was encouraged to push up w/ the L UE and reach back w/ the L UE. CGA provided throughout.  -at end of session, removed paddle and docking station and OT facilitated active extension of wrist/hand/digits.  -At end of session, added a 2nd piece of KT targeting ECRL and removed y-tail that would have targeted ECU. However, no significant change was noted in ulnar deviation during wrist extenison.     Home Exercises and Education Provided     Education provided:   - continue pelvic tilting for HEP  - reviewed ed that she needs to bend at the wrist and not at the MCPs.   - Progress towards goals     Written Home Exercises Provided: Patient instructed to cont prior HEP.  Exercises were reviewed and Caitlyn was able to demonstrate them prior to the end of the session.  Caityln demonstrated good  understanding of the HEP provided.   .   See EMR under Patient " Instructions for exercises provided 2/26/2020.        Assessment     Pt would continue to benefit from skilled OT. She demo's increased AAROM in wrist extension w/ KT facilitation. However, ECU is significantly more active than ECRL/ECRB - leading to consistent ulnar deviation with wrist extension. She may benefit from KT to inhibit FDS. ROM in external rotation remains tight, limiting potential functional movement. Good tolerance for sit<>stand activity today.     Caitlyn is progressing well towards her goals and there are no updates to goals at this time. Pt prognosis is Fair.    Pt will continue to benefit from skilled outpatient occupational therapy to address the deficits listed in the problem list on initial evaluation provide pt/family education and to maximize pt's level of independence in the home and community environment.     Anticipated barriers to occupational therapy: possible transportation concerns, 10 years since initial onset of symptoms    Pt's spiritual, cultural and educational needs considered and pt agreeable to plan of care and goals.    Goals:  Short Term Goals: 4 weeks   1) Caitlyn will be mod I with initial HEP. (progressing 3/2/2020)  2) Caitlyn will demo improved PROM ER of L shoulder by at least 10* at each of 3 positions supine. (progressing 3/2/2020)  3) Caitlyn will demo elbow extension -30 or better during L UE reaching. (progressing 3/2/2020)     Long Term Goals: 12 weeks   1) Caitlyn will be able to make her bed with shaun UE and modified techniques as necessary. (progressing 3/2/2020)  2) Caitlyn will be able to sweep her floor with modified techniques as necessary. (progressing 3/2/2020)  3) Caitlyn will demo improved L UE function, as evidenced by a FTHUE score of 6/7. (progressing 3/2/2020)  4) Caitlyn will be mod I with d/c HEP. (progressing 3/2/2020)  5) Caitlyn will demo elbow extension -15 or better AROM during reaching. (progressing 3/2/2020)  6) Caitlyn will demo PROM ER of L  shoulder of at least 45* at each of 3 positions supine for increased fxl use of the L shoulder. (progressing 3/2/2020)    Plan     Updates/Grading for next session: consider KT to inhibit FDS; continue stretching, increase functional use of the L UE, joseluis as a support during standing activity.     Myranda Valencia, OT

## 2020-03-04 ENCOUNTER — CLINICAL SUPPORT (OUTPATIENT)
Dept: REHABILITATION | Facility: HOSPITAL | Age: 55
End: 2020-03-04
Attending: INTERNAL MEDICINE
Payer: MEDICARE

## 2020-03-04 DIAGNOSIS — R68.89 IMPAIRED FUNCTION OF UPPER EXTREMITY: ICD-10-CM

## 2020-03-04 DIAGNOSIS — R27.9 LACK OF COORDINATION AS LATE EFFECT OF CEREBROVASCULAR ACCIDENT (CVA): ICD-10-CM

## 2020-03-04 DIAGNOSIS — I69.398 LACK OF COORDINATION AS LATE EFFECT OF CEREBROVASCULAR ACCIDENT (CVA): ICD-10-CM

## 2020-03-04 PROCEDURE — 97112 NEUROMUSCULAR REEDUCATION: CPT | Mod: PN

## 2020-03-04 PROCEDURE — 97110 THERAPEUTIC EXERCISES: CPT | Mod: PN

## 2020-03-04 NOTE — PROGRESS NOTES
Occupational Therapy Daily Treatment Note     Date: 3/4/2020  Name: Caitlyn Perez  Clinic Number: [id number]    Therapy Diagnosis:   Encounter Diagnoses   Name Primary?   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   Response to previous treatment:no concerns from tx  Functional change: nothing specific noted, though she does note her hand is staying open better.    Pain: 0/10  Location:  N/a: no c/o pain today.     Objective     Caitlyn received the following manual therapy techniques for 5 minutes:   - Gentle mobilization of joints involving L UE phalanges, metacarpals, carpals, radius and ulna.     Caitlyn received therapeutic exercises for 30 minutes including:  - Gentle stretching of the soft tissues of the L wrist and hand to decrease edema and improve PROM, potentially allowing for increases in active movement.   -Placed flat paddle on pt's hand for duration of treatment to inhibit flexor tone and provide LLPS to long finger flexors.  - Gentle mobilization and stretching of soft-tissues throughout the shoulder, including levator, serratus anterior, subscapularis, anterior complex, lats and combined internal rotators to allow for increased PROM.  -shaun scap retraction 2x10 AROM  -shaun scap depression 2x10 AROM  -UBE x5 min forward/ 5 min back w/ shaun UE, level 1.0 w/ paddle attachment for L UE, allowing for increased PROM throughout the L UE. OT  "encouraged her to focus on keeping shaun scap depressed throughout UBE. She needed min-mod cues.    Caitlyn participated in dynamic functional therapeutic activities to improve functional performance for 0  minutes, including:  -n/a    Caitlyn participated in neuromuscular re-education activities to improve: Balance, Coordination, Kinesthetic Sense and Posture for 20 minutes. The following activities were included:  -AAROM wrist extension 2x5 with encouragement to "pull thumb up with wrist" as this seemed to improve engagement of ECRL.   -AROM a/p pelvic tilt, R/L lateral pelvic tilt. Points of contact at L QL for maximizing movement toward R lateral tilt. Hands at sides in position of support to facilitate scapulo-pelvic rhythm.  -Added 70* docking station to inhibit over-recruitment of flexors and provide LLPS to wrist and long finger flexors.   -during amb at end of session, OT encouraged Caitlyn to relax the L UE, placing hand closer to her hip than over her abdomen. This interfered with her balance and she required CGA-min A for balance during the ~5 steps that she took with her arm more relaxed. Once she returned the arm to her belly, she completed amb all the way to her car with supervision. Pt requesting OT to fix resting hand orthosis so it can support her - fingers bend with it, especially the index finger.    Home Exercises and Education Provided     Education provided:   - continue pelvic tilting for HEP  - reviewed ed that she needs to bend at the wrist and not at the MCPs.   - Progress towards goals     Written Home Exercises Provided: Patient instructed to cont prior HEP.  Exercises were reviewed and Caitlyn was able to demonstrate them prior to the end of the session.  Caitlyn demonstrated good  understanding of the HEP provided.   .   See EMR under Patient Instructions for exercises provided 2/26/2020.        Assessment     Pt would continue to benefit from skilled OT. Improving AROM wrist extension noted " today after LLPS and KT facilitating wrist extensors. Caitlyn demo's weakness throughout her core and a lot of her tightness appears to be a result of her attempts to stabilize herself during functional mobility. indep w/ amb declines when the L UE is repositioned and she's not able to use that tightness for stabilization.    Caitlyn is progressing well towards her goals and there are no updates to goals at this time. Pt prognosis is Fair.    Pt will continue to benefit from skilled outpatient occupational therapy to address the deficits listed in the problem list on initial evaluation provide pt/family education and to maximize pt's level of independence in the home and community environment.     Anticipated barriers to occupational therapy: possible transportation concerns, 10 years since initial onset of symptoms    Pt's spiritual, cultural and educational needs considered and pt agreeable to plan of care and goals.    Goals:  Short Term Goals: 4 weeks   1) Caitlyn will be mod I with initial HEP. (progressing 3/4/2020)  2) Caitlyn will demo improved PROM ER of L shoulder by at least 10* at each of 3 positions supine. (progressing 3/4/2020)  3) Caitlyn will demo elbow extension -30 or better during L UE reaching. (progressing 3/4/2020)     Long Term Goals: 12 weeks   1) Caitlyn will be able to make her bed with shaun UE and modified techniques as necessary. (progressing 3/4/2020)  2) Caitlyn will be able to sweep her floor with modified techniques as necessary. (progressing 3/4/2020)  3) Caitlyn will demo improved L UE function, as evidenced by a FTHUE score of 6/7. (progressing 3/4/2020)  4) Caitlyn will be mod I with d/c HEP. (progressing 3/4/2020)  5) Caitlyn will demo elbow extension -15 or better AROM during reaching. (progressing 3/4/2020)  6) Caitlyn will demo PROM ER of L shoulder of at least 45* at each of 3 positions supine for increased fxl use of the L shoulder. (progressing 3/4/2020)    Plan     Updates/Grading  for next session: consider KT to inhibit FDS; continue stretching, increase functional use of the L UE, joseluis as a support during standing activity. Patch resting orthosis. She is interested in [facility]. Will pursue orders.    Myranda Valencia OT

## 2020-03-05 ENCOUNTER — TELEPHONE (OUTPATIENT)
Dept: REHABILITATION | Facility: HOSPITAL | Age: 55
End: 2020-03-05

## 2020-03-06 ENCOUNTER — CLINICAL SUPPORT (OUTPATIENT)
Dept: REHABILITATION | Facility: HOSPITAL | Age: 55
End: 2020-03-06
Attending: INTERNAL MEDICINE
Payer: MEDICARE

## 2020-03-06 DIAGNOSIS — R68.89 IMPAIRED FUNCTION OF UPPER EXTREMITY: ICD-10-CM

## 2020-03-06 DIAGNOSIS — R26.9 GAIT ABNORMALITY: Primary | ICD-10-CM

## 2020-03-06 DIAGNOSIS — G81.10 SPASTIC HEMIPARESIS: ICD-10-CM

## 2020-03-06 DIAGNOSIS — Z91.81 AT HIGH RISK FOR FALLS: ICD-10-CM

## 2020-03-06 DIAGNOSIS — R27.9 LACK OF COORDINATION AS LATE EFFECT OF CEREBROVASCULAR ACCIDENT (CVA): ICD-10-CM

## 2020-03-06 DIAGNOSIS — I69.398 LACK OF COORDINATION AS LATE EFFECT OF CEREBROVASCULAR ACCIDENT (CVA): ICD-10-CM

## 2020-03-06 DIAGNOSIS — Z74.09 IMPAIRED FUNCTIONAL MOBILITY, BALANCE, GAIT, AND ENDURANCE: ICD-10-CM

## 2020-03-06 PROCEDURE — 97162 PT EVAL MOD COMPLEX 30 MIN: CPT | Mod: PN

## 2020-03-06 PROCEDURE — 97110 THERAPEUTIC EXERCISES: CPT | Mod: PN

## 2020-03-06 NOTE — PLAN OF CARE
[facility]  Physical Therapy Neurological Rehabilitation Initial Evaluation    Name: Catilyn Perez  Clinic Number: [id number]    Therapy Diagnosis:   Encounter Diagnoses   Name Primary?    Gait abnormality Yes    Spastic hemiparesis     At high risk for falls     Impaired functional mobility, balance, gait, and endurance      Physician: Lizeth Isaacs MD    Physician Orders: PT Eval and Treat for gait training  Medical Diagnosis from Referral:   R26.9 (ICD-10-CM) - Gait abnormality   G81.10 (ICD-10-CM) - Spastic hemiparesis   Z91.81 (ICD-10-CM) - At high risk for falls     Evaluation Date: 3/6/2020  Authorization Period Expiration: 2020  Plan of Care Expiration: 2020  Visit # / Visits authorized:     Time In: 1010  Time Out: 1055  Total Billable Time: 42 minutes    Precautions: Fall    Subjective   Date of onset: < 6 months  History of current condition - Caitlyn presents to PT with history of CVA due to aneurysm about 10 years ago. She was doing fairly well until a few months ago when the gym she was going to was no longer participating in her insurance plan which resulted in her not being able to participate in her regular exercise routine due to transportation challenges.  She feels like she has been losing functional independence since [employer] cancelled the membership to the gym that was close to where she lives. She reports that she has experienced increased incidence of falls since that time.  She is now referred to PT due to increased fall risk  Medical History:   Past Medical History:   Diagnosis Date    Abnormal Pap smear of cervix 2011    Aneurysm     Family history of breast cancer     Hypertension     Parathyroid disease     Stroke     Lt sided weakness       Surgical History:   Caitlyn Perez  has a past surgical history that includes  section; Tubal ligation; Colonoscopy (N/A, 2016); and Colonoscopy (N/A, 2017).    Medications:    Caitlyn has a current medication list which includes the following prescription(s): amlodipine, amlodipine, aspirin, blood sugar diagnostic, cyanocobalamin (vitamin b-12), gavilyte-g, irbesartan, irbesartan, krill oil, lidocaine, lutein, folic acid/multivit-min/lutein, ondansetron, and walker.    Allergies:   Review of patient's allergies indicates:  No Known Allergies     Imaging, none:     Prior Therapy: Yes  Social History:  lives with their family  Falls: Yes Reports having had 3 since beginning of 2020   DME: Walker, AFO and Hand splint Reports MD has ordered a rollator but has not received it yet.     Home Environment: Single level home with 5 steps to enter.  Has rail on R side (as ascending)    Exercise Routine / History: Has not been participating in any exercises recently   Family Present at time of Eval: No   Occupation: Unable to work  Prior Level of Function: Somewhat limited. Impaired gait, some difficulty with ADLs.  Was participating in modified fitness activities at gym.  Did have risk of falls but reports that she did not fall at all throughout the duration of gym attendance.    Current Level of Function: Increased incidence of falls, increased difficulty with transfers, gait, and self care activities.      Pain:  Current 0/10, worst 4/10, best 0/10   Location: left back  and hip   Description: intermittent sharp  Aggravating Factors: Too much movement, worse in the morning  Easing Factors: stay still.      Pts goals: to learn how to use wheelchair so I don't have to depend on anybody, to be able to walk better with my brace so I don't stumble.      Objective     Posture: Standing:  Weight shifted toward R with L hip externally rotated.  Minimal L hip and knee flexion with decreased weight bearing on L. L hand in resting hand splint  Sitting:  Minimally decreased lumbar lordosis, increased thoracic kyphosis, moderate rounded shoulder and forward head posture.   Palpation: N/A  Sensation: No  reported deficits this date   DTRs:  Not tested this date  Range of Motion/Strength:   Hip  Right   Left  Pain/Dysfunction with Movement    AROM PROM MMT AROM PROM MMT    Flexion  WFL  WFL  4/5-4+/5  WFL  WFL  4-/5-4/5    Extension  WFL  WFL  4/5-4+/5  WFL  WFL  4/5    Abduction  WFL  WFL  4+/5  WFL  WFL  4/5    Adduction  WFL  WFL  4+/5  WFL  WFL  4/5    Internal rotation  WFL  WFL  4/5  N/A  5*  3-/5    External rotation  WFL  WFL  4/5 WFL  WFL  4-/5        Knee  Right   Left  Pain/Dysfunction with Movement    AROM PROM MMT AROM PROM MMT    Flexion  WFL  WFL  4+/5  85*  WFL  3-/5    Extension  WFL WFL  5/5  0*  0*  3+/5      Ankle: R DF WFL and 5/5, L Minimal active movement available.  Strength 1/5-2-/5     Flexibility: Hamstring length R 170*, L 125*.  Hip flexors R WNL, L minimal tightness; piriformis R WNL, L moderate tightness.  Ankle R minimal tightness, L Moderate tightness.    Gait: Without AD  Analysis: Step length uneven, increased base of support, decreased weight shift to L, decreased stance time on L, decreased L foot clearance, decreased L arm swing, decreased trunk counter rotation.      Bed Mobility:Independent  Increased UE support needed.    Transfers: Modified Independent Requires increased UE support, demonstrates decreased support through L LE.    Special Tests: Tinetti Balance and Gait Assessment:  Balance 9/16, Gait 4/12. Total 13/28     CMS Impairment/Limitation/Restriction for FOTO Cerebrovascular Disorders Survey    Therapist reviewed FOTO scores for Caitlyn Perez on 3/6/2020.   FOTO documents entered into [employer] - see Media section.    Limitation Score: 54%         TREATMENT   Treatment Time In: 1045  Treatment Time Out: 1055  Total Treatment time separate from Evaluation: 10 minutes    Caitlyn received therapeutic exercises to develop strength and flexibility for 10 minutes including:   Passive gastroc stretch 3x30s B   Passive hamstring stretch 3x30s L   Supine Active/active assisted L  hip internal rotation (hip and knee in neutral x 10   Supine Active/active assisted L hip/knee flexion/extension x 10      Home Exercises and Patient Education Provided    Education provided:   - Discussed POC    Written Home Exercises Provided: None yet provided.  Exercises were reviewed and Caitlyn was able to demonstrate them prior to the end of the session.  Caitlyn demonstrated fair  understanding of the education provided.     See EMR under N/A for exercises provided N/A.    Assessment   Caitlyn is a 54 y.o. female referred to outpatient Physical Therapy with a medical diagnosis of gait abnormality, spastic hemiparesis, and at high risk for falls. Pt presents with Impaired posture, decreased LE flexibility, decreased LE and core strength, and impaired balance. These problems contribute to impaired functional mobility and impaired gait.      Pt prognosis is Fair.   Pt will benefit from skilled outpatient Physical Therapy to address the deficits stated above and in the chart below, provide pt/family education, and to maximize pt's level of independence.     Plan of care discussed with patient: Yes  Pt's spiritual, cultural and educational needs considered and patient is agreeable to the plan of care and goals as stated below:     Anticipated Barriers for therapy: possible transportation concerns, 10 years since initial onset of symptoms    Medical Necessity is demonstrated by the following  History  Co-morbidities and personal factors that may impact the plan of care Co-morbidities:   history of CVA, HTN and history of aneurysm    Personal Factors:   no deficits     moderate   Examination  Body Structures and Functions, activity limitations and participation restrictions that may impact the plan of care Body Regions:   lower extremities  trunk    Body Systems:    gross symmetry  ROM  strength  balance  gait  transfers  blood pressure    Participation Restrictions:   Transportation    Activity limitations:  "  Learning and applying knowledge  solving problems    General Tasks and Commands  undertaking multiple tasks    Communication  communicating with/receiving non-verbal language    Mobility  lifting and carrying objects  fine hand use (grasping/picking up)  walking  moving around using equipment (WC)  driving (bike, car, motorcycle)    Self care  washing oneself (bathing, drying, washing hands)  caring for body parts (brushing teeth, shaving, grooming)  toileting  dressing  looking after one's health    Domestic Life  shopping  cooking  doing house work (cleaning house, washing dishes, laundry)  assisting others    Interactions/Relationships  complex interpersonal interactions    Life Areas  employment    Community and Social Life  community life  recreation and leisure         high   Clinical Presentation evolving clinical presentation with changing clinical characteristics moderate   Decision Making/ Complexity Score: moderate     Goals:  Short Term Goals: 3 weeks    1) Facilitate improved posture with equal weight bearing in standing   2) Facilitate improved LE flexibility    3) Patient will demonstrate improved active movement of L LE to assist with ADLs and positioning   4) Facilitate improved L foot clearance during ambulation.      Long Term Goals: 6 weeks    1) Patient will achieve near equal weight shift during ambulation to facilitate improved foot clearance   2) Patient will consistently perform sit <> stand transfers safely with minimal use of UE    3) Patient will safely navigate 4-6" steps with appropriate use of hand rails appropriate pattern   4) Improve functional limitation rating as indicated by FOTO to < 40%    5) Patient will be independent in HEP.      Plan   Plan of care Certification: 3/6/2020 to 4/17/2020.    Outpatient Physical Therapy 2 times weekly for 6 weeks to include the following interventions: Gait Training, Manual Therapy, Neuromuscular Re-ed, Orthotic Management and Training, " Patient Education, Therapeutic Activites and Therapeutic Exercise.     Jeanette Martin, PT

## 2020-03-06 NOTE — TELEPHONE ENCOUNTER
----- Message from Sharmin Garibay MA sent at 3/6/2020  1:44 PM CST -----  Pt came into the office today requesting order for a rollator

## 2020-03-07 NOTE — PROGRESS NOTES
Occupational Therapy Daily Treatment Note     Date: 3/6/2020  Name: Caitlyn Perez  Clinic Number: [id number]    Therapy Diagnosis:   Encounter Diagnoses   Name Primary?    Impaired function of upper extremity     Lack of coordination as late effect of cerebrovascular accident (CVA)      Physician: Lizeth Isaacs MD    Physician Orders: Eval and Treat  Medical Diagnosis: spastic hemiparesis  Evaluation Date: 2/18/2020  Plan of Care Expiration Period: 2/18/2020-5/12/2020  Insurance Authorization period Expiration: 4/18/2020  Date of Return to MD: tba  Visit # / Visits Authorized: 4/ 12    Time In: 0910  Time Out: 0945  Total Billable Time: 0 minutes     Precautions: standard, fall, borderline diabetes      Subjective     Pt reports: She was compliant with home exercise program.   Response to previous treatment:no concerns from tx  Functional change: nothing specific noted     Pain: 0/10  Location:  N/a: no c/o pain today.     Objective     Cailtyn received the following manual therapy techniques for 0 minutes:   n/a    Caitlyn received therapeutic exercises for 0 minutes including:  N/a    Caitlyn participated in dynamic functional therapeutic activities to improve functional performance for 0  minutes, including:  -n/a    Caitlyn participated in neuromuscular re-education activities to improve: Balance, Coordination, Kinesthetic Sense  N/a    OT attempted to call pt previous night to tell her not to come for OT today as she has already had therapy 2x this week, but just to come for PT eval at 10am. Pt's phone was disconnected. As pt arrived for appointment, OT took time and upgraded pt's resting hand orthosis, improving strapping to keep the index finger out of flexion. Fit appeared good and pt demo'd ability to doff/don. No charge.    Home Exercises and Education Provided     Education provided:   - orthotic wear/care   - Progress towards goals     Written Home Exercises Provided: Patient instructed to cont  prior HEP.  Exercises were reviewed and Caitlyn was able to demonstrate them prior to the end of the session.  Caitlyn demonstrated good  understanding of the HEP provided.   .   See EMR under Patient Instructions for exercises provided 2/26/2020.        Assessment     Pt would continue to benefit from skilled OT. Resting orthosis appears to have good fit and pt demo's ability to doff//don.     Caitlyn is progressing well towards her goals and there are no updates to goals at this time. Pt prognosis is Fair.    Pt will continue to benefit from skilled outpatient occupational therapy to address the deficits listed in the problem list on initial evaluation provide pt/family education and to maximize pt's level of independence in the home and community environment.     Anticipated barriers to occupational therapy: possible transportation concerns, 10 years since initial onset of symptoms    Pt's spiritual, cultural and educational needs considered and pt agreeable to plan of care and goals.    Goals:  Short Term Goals: 4 weeks   1) Caitlyn will be mod I with initial HEP. (progressing 3/6/2020)  2) Caitlyn will demo improved PROM ER of L shoulder by at least 10* at each of 3 positions supine. (progressing 3/6/2020)  3) Caitlyn will demo elbow extension -30 or better during L UE reaching. (progressing 3/6/2020)     Long Term Goals: 12 weeks   1) Caitlyn will be able to make her bed with shaun UE and modified techniques as necessary. (progressing 3/6/2020)  2) Caitlyn will be able to sweep her floor with modified techniques as necessary. (progressing 3/6/2020)  3) Caitlyn will demo improved L UE function, as evidenced by a FTHUE score of 6/7. (progressing 3/6/2020)  4) Caitlyn will be mod I with d/c HEP. (progressing 3/6/2020)  5) Caitlyn will demo elbow extension -15 or better AROM during reaching. (progressing 3/6/2020)  6) Caitlyn will demo PROM ER of L shoulder of at least 45* at each of 3 positions supine for increased fxl use of  the L shoulder. (progressing 3/6/2020)    Plan     Updates/Grading for next session: consider KT to inhibit FDS; continue stretching, increase functional use of the L UE, joseluis as a support during standing activity. Patch resting orthosis. She is interested in [facility]. Will pursue orders.    Myranda Valencia, OT

## 2020-03-10 ENCOUNTER — ANESTHESIA EVENT (OUTPATIENT)
Dept: ENDOSCOPY | Facility: HOSPITAL | Age: 55
End: 2020-03-10
Payer: MEDICARE

## 2020-03-10 ENCOUNTER — HOSPITAL ENCOUNTER (OUTPATIENT)
Facility: HOSPITAL | Age: 55
Discharge: HOME | End: 2020-03-10
Attending: COLON & RECTAL SURGERY | Admitting: COLON & RECTAL SURGERY
Payer: MEDICARE

## 2020-03-10 ENCOUNTER — ANESTHESIA (OUTPATIENT)
Dept: ENDOSCOPY | Facility: HOSPITAL | Age: 55
End: 2020-03-10
Payer: MEDICARE

## 2020-03-10 VITALS
TEMPERATURE: 98 F | RESPIRATION RATE: 16 BRPM | DIASTOLIC BLOOD PRESSURE: 78 MMHG | BODY MASS INDEX: 35.85 KG/M2 | HEIGHT: 64 IN | SYSTOLIC BLOOD PRESSURE: 131 MMHG | OXYGEN SATURATION: 98 % | WEIGHT: 210 LBS | HEART RATE: 63 BPM

## 2020-03-10 DIAGNOSIS — Z12.11 SCREENING FOR MALIGNANT NEOPLASM OF COLON: Primary | ICD-10-CM

## 2020-03-10 LAB — POCT GLUCOSE: 108 MG/DL (ref 70–110)

## 2020-03-10 PROCEDURE — E9220 PRA ENDO ANESTHESIA: HCPCS | Mod: PT,,, | Performed by: NURSE ANESTHETIST, CERTIFIED REGISTERED

## 2020-03-10 PROCEDURE — 63600175 PHARM REV CODE 636 W HCPCS: Performed by: COLON & RECTAL SURGERY

## 2020-03-10 PROCEDURE — 37000009 HC ANESTHESIA EA ADD 15 MINS: Performed by: COLON & RECTAL SURGERY

## 2020-03-10 PROCEDURE — 45385 COLONOSCOPY W/LESION REMOVAL: CPT | Performed by: COLON & RECTAL SURGERY

## 2020-03-10 PROCEDURE — 88305 TISSUE EXAM BY PATHOLOGIST: CPT | Mod: 26,,, | Performed by: PATHOLOGY

## 2020-03-10 PROCEDURE — 63600175 PHARM REV CODE 636 W HCPCS: Performed by: NURSE ANESTHETIST, CERTIFIED REGISTERED

## 2020-03-10 PROCEDURE — 37000008 HC ANESTHESIA 1ST 15 MINUTES: Performed by: COLON & RECTAL SURGERY

## 2020-03-10 PROCEDURE — 25000003 PHARM REV CODE 250: Performed by: NURSE ANESTHETIST, CERTIFIED REGISTERED

## 2020-03-10 PROCEDURE — 27201089 HC SNARE, DISP (ANY): Performed by: COLON & RECTAL SURGERY

## 2020-03-10 PROCEDURE — 88305 TISSUE EXAM BY PATHOLOGIST: ICD-10-PCS | Mod: 26,,, | Performed by: PATHOLOGY

## 2020-03-10 PROCEDURE — 88305 TISSUE EXAM BY PATHOLOGIST: CPT | Performed by: PATHOLOGY

## 2020-03-10 PROCEDURE — 45385 COLONOSCOPY W/LESION REMOVAL: CPT | Mod: PT,,, | Performed by: COLON & RECTAL SURGERY

## 2020-03-10 PROCEDURE — E9220 PRA ENDO ANESTHESIA: ICD-10-PCS | Mod: PT,,, | Performed by: NURSE ANESTHETIST, CERTIFIED REGISTERED

## 2020-03-10 PROCEDURE — 45385 PR COLONOSCOPY,REMV LESN,SNARE: ICD-10-PCS | Mod: PT,,, | Performed by: COLON & RECTAL SURGERY

## 2020-03-10 RX ORDER — PROPOFOL 10 MG/ML
VIAL (ML) INTRAVENOUS
Status: DISCONTINUED | OUTPATIENT
Start: 2020-03-10 | End: 2020-03-10

## 2020-03-10 RX ORDER — PROPOFOL 10 MG/ML
VIAL (ML) INTRAVENOUS CONTINUOUS PRN
Status: DISCONTINUED | OUTPATIENT
Start: 2020-03-10 | End: 2020-03-10

## 2020-03-10 RX ORDER — LIDOCAINE HYDROCHLORIDE 20 MG/ML
INJECTION, SOLUTION EPIDURAL; INFILTRATION; INTRACAUDAL; PERINEURAL
Status: DISCONTINUED | OUTPATIENT
Start: 2020-03-10 | End: 2020-03-10

## 2020-03-10 RX ORDER — SODIUM CHLORIDE 9 MG/ML
INJECTION, SOLUTION INTRAVENOUS CONTINUOUS
Status: DISCONTINUED | OUTPATIENT
Start: 2020-03-10 | End: 2020-03-10 | Stop reason: HOSPADM

## 2020-03-10 RX ADMIN — SODIUM CHLORIDE: 0.9 INJECTION, SOLUTION INTRAVENOUS at 09:03

## 2020-03-10 RX ADMIN — LIDOCAINE HYDROCHLORIDE 80 MG: 20 INJECTION, SOLUTION EPIDURAL; INFILTRATION; INTRACAUDAL at 12:03

## 2020-03-10 RX ADMIN — PROPOFOL 90 MG: 10 INJECTION, EMULSION INTRAVENOUS at 12:03

## 2020-03-10 RX ADMIN — PROPOFOL 50 MCG/KG/MIN: 10 INJECTION, EMULSION INTRAVENOUS at 12:03

## 2020-03-10 NOTE — DISCHARGE INSTRUCTIONS
Colonoscopy     A camera attached to a flexible tube with a viewing lens is used to take video pictures.     Colonoscopy is a test to view the inside of your lower digestive tract (colon and rectum). Sometimes it can show the last part of the small intestine (ileum). During the test, small pieces of tissue may be removed for testing. This is called a biopsy. Small growths, such as polyps, may also be removed.   Why is colonoscopy done?  The test is done to help look for colon cancer. And it can help find the source of abdominal pain, bleeding, and changes in bowel habits. It may be needed once a year, depending on factors such as your:  · Age  · Health history  · Family health history  · Symptoms  · Results from any prior colonoscopy  Risks and possible complications  These include:  · Bleeding               · A puncture or tear in the colon   · Risks of anesthesia  · A cancer lesion not being seen  Getting ready   To prepare for the test:  · Talk with your healthcare provider about the risks of the test (see below). Also ask your healthcare provider about alternatives to the test.  · Tell your healthcare provider about any medicines you take. Also tell him or her about any health conditions you may have.  · Make sure your rectum and colon are empty for the test. Follow the diet and bowel prep instructions exactly. If you dont, the test may need to be rescheduled.  · Plan for a friend or family member to drive you home after the test.     Colonoscopy provides an inside view of the entire colon.     You may discuss the results with your doctor right away or at a future visit.  During the test   The test is usually done in the hospital on an outpatient basis. This means you go home the same day. The procedure takes about 30 minutes. During that time:  · You are given relaxing (sedating) medicine through an IV line. You may be drowsy, or fully asleep.  · The healthcare provider will first give you a physical exam to  check for anal and rectal problems.  · Then the anus is lubricated and the scope inserted.  · If you are awake, you may have a feeling similar to needing to have a bowel movement. You may also feel pressure as air is pumped into the colon. Its OK to pass gas during the procedure.  · Biopsy, polyp removal, or other treatments may be done during the test.  After the test   You may have gas right after the test. It can help to try to pass it to help prevent later bloating. Your healthcare provider may discuss the results with you right away. Or you may need to schedule a follow-up visit to talk about the results. After the test, you can go back to your normal eating and other activities. You may be tired from the sedation and need to rest for a few hours.  Date Last Reviewed: 11/1/2016 © 2000-2017 [employer]. [address] [place] [address], [place] [address]. All rights reserved. This information is not intended as a substitute for professional medical care. Always follow your healthcare professional's instructions.

## 2020-03-10 NOTE — TRANSFER OF CARE
"Anesthesia Transfer of Care Note    Patient: Caitlyn Perez    Procedure(s) Performed: Procedure(s) (LRB):  COLONOSCOPY (N/A)    Patient location: PACU    Anesthesia Type: general    Transport from OR: Transported from OR on room air with adequate spontaneous ventilation    Post pain: adequate analgesia    Post assessment: no apparent anesthetic complications and tolerated procedure well    Post vital signs: stable    Level of consciousness: sedated    Nausea/Vomiting: no nausea/vomiting    Complications: none    Transfer of care protocol was followed      Last vitals:   Visit Vitals  BP (!) 103/59 (BP Location: Left arm)   Pulse 75   Temp 36.6 °C (97.9 °F)   Resp 16   Ht 5' 4" (1.626 m)   Wt 95.3 kg (210 lb)   SpO2 97%   Breastfeeding? No   BMI 36.05 kg/m²     "

## 2020-03-10 NOTE — BRIEF OP NOTE
COLONOSCOPY HISTORY AND PHYSICAL EXAM    Procedure : Colonoscopy      INDICATIONS: family history of colon cancer (Uncle at age 65) and personal history of colon polyps    Family Hx of CRC: Uncle at age 65    Last Colonoscopy:  2017  Findings: Normal (but prior colonoscopies with polyps)       Past Medical History:   Diagnosis Date    Abnormal Pap smear of cervix 2011    Aneurysm     Family history of breast cancer     Hypertension     Parathyroid disease     Stroke     Lt sided weakness     Sedation Problems: NO  Family History   Problem Relation Age of Onset    Diabetes type II Mother     Kidney disease Mother         on HD due to diabetes    Hypertension Mother     Diabetes Mother     Breast cancer Father 83    Breast cancer Paternal Uncle     Breast cancer Paternal Aunt 17    Lung cancer Maternal Aunt     Diabetes Maternal Aunt     Diabetes Brother     Diabetes Maternal Uncle     Diabetes Maternal Aunt     Diabetes Maternal Aunt     Diabetes Maternal Aunt     Diabetes Maternal Uncle     Diabetes Maternal Uncle     Diabetes Maternal Uncle     Breast cancer Paternal Grandmother      Fam Hx of Sedation Problems: NO  Social History     Socioeconomic History    Marital status:      Spouse name: Not on file    Number of children: 6    Years of education: 12    Highest education level: Not on file   Occupational History    Occupation: disabled     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   Social Needs    Financial resource strain: Not on file    Food insecurity:     Worry: Not on file     Inability: Not on file    Transportation needs:     Medical: Not on file     Non-medical: Not on file   Tobacco Use    Smoking status: Never Smoker    Smokeless tobacco: Never Used   Substance and Sexual Activity    Alcohol use: Yes     Comment: 2x per week     Drug use: No    Sexual activity: Not Currently     Partners: Male     Birth control/protection: None   Lifestyle    Physical  "activity:     Days per week: Not on file     Minutes per session: Not on file    Stress: Not on file   Relationships    Social connections:     Talks on phone: Not on file     Gets together: Not on file     Attends Latter day service: Not on file     Active member of club or organization: Not on file     Attends meetings of clubs or organizations: Not on file     Relationship status: Not on file   Other Topics Concern    Not on file   Social History Narrative    Walks for exercise    4 of her 6 children live with her.  15, 15, 17, yo and 27 live with her       Review of Systems - Negative except   Respiratory ROS: no dyspnea  Cardiovascular ROS: no exertional chest pain  Gastrointestinal ROS: NO abdominal discomfort,  NO rectal bleeding  Musculoskeletal ROS: no muscular pain  Neurological ROS: no recent stroke    Physical Exam:  BP (!) 133/91   Pulse 63   Temp 97.9 °F (36.6 °C)   Resp 14   Ht 5' 4" (1.626 m)   Wt 95.3 kg (210 lb)   SpO2 99%   Breastfeeding? No   BMI 36.05 kg/m²   General: no distress  Head: normocephalic  Mallampati Score   Neck: supple, symmetrical, trachea midline  Lungs:  clear to auscultation bilaterally and normal respiratory effort  Heart: regular rate and rhythm and no murmur  Abdomen: soft, non-tender non-distented; bowel sounds normal; no masses,  no organomegaly  Extremities: no cyanosis or edema, or clubbing    ASA:  II    PLAN  COLONOSCOPY.    SedationPlan :MAC    The details of the procedure, the possible need for biopsy or polypectomy and the potential risks including bleeding, perforation, missed polyps were discussed in detail.      "

## 2020-03-10 NOTE — ANESTHESIA POSTPROCEDURE EVALUATION
Anesthesia Post Evaluation    Patient: Caitlyn Perez    Procedure(s) Performed: Procedure(s) (LRB):  COLONOSCOPY (N/A)    Final Anesthesia Type: general    Patient location during evaluation: PACU  Patient participation: Yes- Able to Participate  Level of consciousness: awake and alert  Post-procedure vital signs: reviewed and stable  Pain management: adequate  Airway patency: patent    PONV status at discharge: No PONV  Anesthetic complications: no      Cardiovascular status: blood pressure returned to baseline  Respiratory status: spontaneous ventilation and room air  Hydration status: euvolemic  Follow-up not needed.          Vitals Value Taken Time   /78 3/10/2020  1:22 PM   Temp 36.6 °C (97.9 °F) 3/10/2020 12:49 PM   Pulse 63 3/10/2020  1:22 PM   Resp 16 3/10/2020  1:22 PM   SpO2 98 % 3/10/2020  1:22 PM         Event Time     Out of Recovery 13:24:54          Pain/Sal Score: Sal Score: 10 (3/10/2020  1:10 PM)

## 2020-03-10 NOTE — [employer] PATIENT INSTRUCTIONS
Discharge Summary/Instructions after an Endoscopic Procedure  Patient Name: Caitlyn Perez  Patient MRN: [id number]  Patient YOB: 1965  Tuesday, March 10, 2020  Narciso Miranda MD  RESTRICTIONS:  During your procedure today, you received medications for sedation.  These   medications may affect your judgment, balance and coordination.  Therefore,   for 24 hours, you have the following restrictions:   - DO NOT drive a car, operate machinery, make legal/financial decisions,   sign important papers or drink alcohol.    ACTIVITY:  Today: no heavy lifting, straining or running due to procedural   sedation/anesthesia.  The following day: return to full activity including work.  DIET:  Eat and drink normally unless instructed otherwise.     TREATMENT FOR COMMON SIDE EFFECTS:  - Mild abdominal pain, nausea, belching, bloating or excessive gas:  rest,   eat lightly and use a heating pad.  - Sore Throat: treat with throat lozenges and/or gargle with warm salt   water.  - Because air was used during the procedure, expelling large amounts of air   from your rectum or belching is normal.  - If a bowel prep was taken, you may not have a bowel movement for 1-3 days.    This is normal.  SYMPTOMS TO WATCH FOR AND REPORT TO YOUR PHYSICIAN:  1. Abdominal pain or bloating, other than gas cramps.  2. Chest pain.  3. Back pain.  4. Signs of infection such as: chills or fever occurring within 24 hours   after the procedure.  5. Rectal bleeding, which would show as bright red, maroon, or black stools.   (A tablespoon of blood from the rectum is not serious, especially if   hemorrhoids are present.)  6. Vomiting.  7. Weakness or dizziness.  GO DIRECTLY TO THE NEAREST EMERGENCY ROOM IF YOU HAVE ANY OF THE FOLLOWING:      Difficulty breathing              Chills and/or fever over 101 F   Persistent vomiting and/or vomiting blood   Severe abdominal pain   Severe chest pain   Black, tarry stools   Bleeding- more than one tablespoon   Any  other symptom or condition that you feel may need urgent attention  Your doctor recommends these additional instructions:  If any biopsies were taken, your doctors clinic will contact you in 1 to 2   weeks with any results.  - Discharge patient to home (ambulatory).   - Patient has a contact number available for emergencies.  The signs and   symptoms of potential delayed complications were discussed with the   patient.  Return to normal activities tomorrow.  Written discharge   instructions were provided to the patient.   - Resume previous diet.   - Continue present medications.   - Await pathology results.   - Repeat colonoscopy in 3 - 5 years for surveillance based on pathology   results.  For questions, problems or results please call your physician - Narciso Miranda MD at Work:  [phone].  [facility], EMERGENCY ROOM PHONE NUMBER: [phone]  IF A COMPLICATION OR EMERGENCY SITUATION ARISES AND YOU ARE UNABLE TO REACH   YOUR PHYSICIAN - GO DIRECTLY TO THE EMERGENCY ROOM.  Narciso Miranda MD  3/10/2020 12:44:13 PM  This report has been verified and signed electronically.  [employer]

## 2020-03-10 NOTE — ANESTHESIA PREPROCEDURE EVALUATION
03/10/2020  Caitlyn Perez is a 54 y.o., female.    Anesthesia Evaluation    I have reviewed the Patient Summary Reports.     I have reviewed the Medications.     Review of Systems      Physical Exam  General:  Well nourished    Airway/Jaw/Neck:  Airway Findings: Mallampati: I                Anesthesia Plan  Type of Anesthesia, risks & benefits discussed:  Anesthesia Type:  general  Patient's Preference:   Intra-op Monitoring Plan: standard ASA monitors  Intra-op Monitoring Plan Comments:   Post Op Pain Control Plan:   Post Op Pain Control Plan Comments:   Induction:   IV  Beta Blocker:  Patient is not currently on a Beta-Blocker (No further documentation required).       Informed Consent: Patient understands risks and agrees with Anesthesia plan.  Questions answered. Anesthesia consent signed with patient.  ASA Score: 2     Day of Surgery Review of History & Physical:  There are no significant changes.          Ready For Surgery From Anesthesia Perspective.

## 2020-03-11 ENCOUNTER — CLINICAL SUPPORT (OUTPATIENT)
Dept: REHABILITATION | Facility: HOSPITAL | Age: 55
End: 2020-03-11
Attending: INTERNAL MEDICINE
Payer: MEDICARE

## 2020-03-11 DIAGNOSIS — Z74.09 IMPAIRED FUNCTIONAL MOBILITY, BALANCE, GAIT, AND ENDURANCE: ICD-10-CM

## 2020-03-11 DIAGNOSIS — R26.9 GAIT ABNORMALITY: ICD-10-CM

## 2020-03-11 DIAGNOSIS — Z91.81 AT HIGH RISK FOR FALLS: ICD-10-CM

## 2020-03-11 DIAGNOSIS — R27.9 LACK OF COORDINATION AS LATE EFFECT OF CEREBROVASCULAR ACCIDENT (CVA): ICD-10-CM

## 2020-03-11 DIAGNOSIS — R68.89 IMPAIRED FUNCTION OF UPPER EXTREMITY: ICD-10-CM

## 2020-03-11 DIAGNOSIS — G81.10 SPASTIC HEMIPARESIS: ICD-10-CM

## 2020-03-11 DIAGNOSIS — I69.398 LACK OF COORDINATION AS LATE EFFECT OF CEREBROVASCULAR ACCIDENT (CVA): ICD-10-CM

## 2020-03-11 PROCEDURE — 97110 THERAPEUTIC EXERCISES: CPT | Mod: PN

## 2020-03-11 PROCEDURE — 97116 GAIT TRAINING THERAPY: CPT | Mod: PN,CQ

## 2020-03-11 PROCEDURE — 97112 NEUROMUSCULAR REEDUCATION: CPT | Mod: PN

## 2020-03-11 PROCEDURE — 97110 THERAPEUTIC EXERCISES: CPT | Mod: PN,CQ

## 2020-03-11 NOTE — PROGRESS NOTES
"  Occupational Therapy Daily Treatment Note     Date: 3/11/2020  Name: Caitlyn Perez  Clinic Number: [id number]    Therapy Diagnosis:   Encounter Diagnoses   Name Primary?   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   Response to previous treatment: no concerns from tx; she states her fingers are still curling in her night time orthosis except the index finger.  Functional change: "I can wash the table better. I tried to use the left hand to get up out of the tub."     Pain: 0/10  Location:  N/a: no c/o pain today.     Objective     Caitlyn received the following manual therapy techniques for 5 minutes:   -Gentle mobilization of joints involving L UE phalanges, metacarpals, carpals, radius and ulna.    Caitlyn received therapeutic exercises for 25 minutes including:  -Gentle stretching of the soft tissues of the L wrist and hand to decrease edema and improve PROM, potentially allowing for increases in active movement.   -Placed flat paddle on pt's hand for duration of treatment to inhibit flexor tone and provide LLPS to long finger flexors.  -With docking station on, OT perf'd gentle mobilization and stretching of soft-tissues throughout the shoulder, including levator, serratus anterior, subscapularis, anterior complex, lats and combined internal rotators to allow for increased PROM.  -doffed docking station and completed AROM wrist extension 2x5 " "w/ cues to not allow domination of ECU and increase active input of ECRL.  -doffed paddle to work on stretch to ER@45*abd, 10x30s w/ mod A. Max cues to allow the L UE to relax, yoga mat positioned to ensure arm not riding up toward abduction. She was able to attain 22* PROM ER@45*abd.    Caitlyn participated in dynamic functional therapeutic activities to improve functional performance for 0  minutes, including:  -n/a    Caitlyn participated in neuromuscular re-education activities for 25 minutes to improve: Balance, Coordination, Kinesthetic Sense    -Ed w/ cues for limiting knee hyperextension during amb.  -Facilitation of a/p pelvic tilt, R/L lateral pelvic tilt. Points of contact at L hand; tactile cues for active elbow extension to push into block. Hands at sides in position of support to facilitate scapulo-pelvic rhythm.  -Placed L UE on stool to work on L UE coordination during small to large body movements superimposed on a relatively stable arm. Using L UE for support on stool, Caitlyn perf'd head turns, body leans and reaching. As she was able to control the arm with these activities, we progressed to standing. Perf'd sit<>stand x3; sit<>stand 2x4 w/ 2" block under R LE; sit<>stand x5 w/ 4" block under R LE to increase work required by L LE. With min verbal and tactile cues, she was able to maintain L UE positioning on stool. She did require max cues and mod A for managing L LE knee extension during sit->stand.      Home Exercises and Education Provided     Education provided:   - bring orthosis back to next session for modifications. She v/u.  - Progress towards goals     Written Home Exercises Provided: Patient instructed to cont prior HEP.  Exercises were reviewed and Caitlyn was able to demonstrate them prior to the end of the session.  Caitlyn demonstrated good  understanding of the HEP provided.   .   See EMR under Patient Instructions for exercises provided 2/26/2020.        Assessment     Pt would " continue to benefit from skilled OT. Improving ability to control coordination of L UE for support during sit<>stand activities. She is having difficulty with stretching to ER@45*abd with cane and we will need to continue to address this as increased PROM toward external rotation is necessary for allowing increased shoulder flexion, as well as increased function of the external rotators for stabilizing the arm during shoulder flexion.    Caitlyn is progressing well towards her goals and there are no updates to goals at this time. Pt prognosis is Fair.    Pt will continue to benefit from skilled outpatient occupational therapy to address the deficits listed in the problem list on initial evaluation provide pt/family education and to maximize pt's level of independence in the home and community environment.     Anticipated barriers to occupational therapy: possible transportation concerns, 10 years since initial onset of symptoms    Pt's spiritual, cultural and educational needs considered and pt agreeable to plan of care and goals.    Goals:  Short Term Goals: 4 weeks   1) Caitlyn will be mod I with initial HEP. (progressing 3/11/2020)  2) Caitlyn will demo improved PROM ER of L shoulder by at least 10* at each of 3 positions supine. (progressing 3/11/2020)  3) Caitlyn will demo elbow extension -30 or better during L UE reaching. (progressing 3/11/2020)     Long Term Goals: 12 weeks   1) Caitlyn will be able to make her bed with shaun UE and modified techniques as necessary. (progressing 3/11/2020)  2) Caitlyn will be able to sweep her floor with modified techniques as necessary. (progressing 3/11/2020)  3) Caitlyn will demo improved L UE function, as evidenced by a FTHUE score of 6/7. (progressing 3/11/2020)  4) Caitlyn will be mod I with d/c HEP. (progressing 3/11/2020)  5) Caitlyn will demo elbow extension -15 or better AROM during reaching. (progressing 3/11/2020)  6) Caitlyn will demo PROM ER of L shoulder of at least 45*  at each of 3 positions supine for increased fxl use of the L shoulder. (progressing 3/11/2020)    Plan     Updates/Grading for next session: modify orthosis as necessaryconsider KT to inhibit FDS and to inhibit subscapularis; continue stretching, increase functional use of the L UE, joseluis as a support during standing activity. She is interested in [facility]. Will pursue orders.    Myranda Valencia OT

## 2020-03-11 NOTE — PROGRESS NOTES
Physical Therapy Daily Treatment Note     Name: Caitlyn Perez  Clinic Number: [id number]    Therapy Diagnosis: No diagnosis found.  Physician: Lizeth Isaacs MD    Visit Date: 3/11/2020    Physician Orders: PT Eval and Treat for gait training  Medical Diagnosis from Referral:   R26.9 (ICD-10-CM) - Gait abnormality   G81.10 (ICD-10-CM) - Spastic hemiparesis   Z91.81 (ICD-10-CM) - At high risk for falls      Evaluation Date: 3/6/2020  Authorization Period Expiration: 4/18/2020  Plan of Care Certification Period: 3/6/2020 to 4/17/2020.  Visit # / Visits authorized: 1/12    PTA visit # 1    Time In: 1000  Time Out: 1055  Total Billable Time: 55 minutes    Precautions: Fall    Subjective     Pt reports: she is having no pain.  Pt stated she had a colonoscopy yesterday and is still tired.  Pt has not received a HEP  Response to previous treatment: no complaint  Functional change: n/a    Pain: 0/10      Objective     Caitlyn received therapeutic exercises to develop strength, endurance, ROM, flexibility, posture and core stabilization for 45 minutes including:    HSS 3 x 20 sec seated  scifit x 10 min L-1  Bridging x 20 reps  SLR 2 x 10 reps B LE  S/L abd 2 x 10 reps L LE  Push/pull tech L LE supine x 10 reps  TrA sets seated with SB x 20 reps  Ham sets x 20 reps  Ball squeezes x 20 reps  Mini squats x 20 reps  Standing abd 2 x 10 reps   Standing Marching 2 x 10 reps    Gait training without AD CGA with emphasis on decreasing hyperextension of L knee during stance.  Pt occasionally scissoring causing unsteady gait but no LOB noted today.    Home Exercises Provided and Patient Education Provided     Education provided:   Educated pt on stopping L knee hyperextension during gait.    Written Home Exercises Provided: will distribute when appropriate.  Exercises were reviewed and Caitlyn was able to demonstrate them prior to the end of the session.  Caitlyn demonstrated good  understanding of the education provided.  "      Assessment     Increased L knee hyperextension L knee with stance.    Caitlyn is progressing well towards her goals.   Pt prognosis is Good.     Pt will continue to benefit from skilled outpatient physical therapy to address the deficits listed in the problem list box on initial evaluation, provide pt/family education and to maximize pt's level of independence in the home and community environment.     Pt's spiritual, cultural and educational needs considered and pt agreeable to plan of care and goals.     Anticipated barriers to physical therapy: High Fall Risk    Goals: per Jeanette Martin, PT  Short Term Goals: 3 weeks               1) Facilitate improved posture with equal weight bearing in standing (ongoing)              2) Facilitate improved LE flexibility  (ongoing)              3) Patient will demonstrate improved active movement of L LE to assist with ADLs and positioning (ongoing)              4) Facilitate improved L foot clearance during ambulation.  (ongoing)     Long Term Goals: 6 weeks               1) Patient will achieve near equal weight shift during ambulation to facilitate improved foot clearance (ongoing)              2) Patient will consistently perform sit <> stand transfers safely with minimal use of UE (ongoing)              3) Patient will safely navigate 4-6" steps with appropriate use of hand rails appropriate pattern (ongoing)              4) Improve functional limitation rating as indicated by FOTO to < 40% (will reassess at POC update)              5) Patient will be independent in HEP.  (will initiate when appropriate)      Plan     Cont with there ex, stretching, balance activities to improve posture, balance, flexibility, core strength and functional mobility per POC.    Ruthy Andrew, PTA   "

## 2020-03-12 ENCOUNTER — TELEPHONE (OUTPATIENT)
Dept: HEPATOLOGY | Facility: CLINIC | Age: 55
End: 2020-03-12

## 2020-03-12 ENCOUNTER — CLINICAL SUPPORT (OUTPATIENT)
Dept: REHABILITATION | Facility: HOSPITAL | Age: 55
End: 2020-03-12
Attending: INTERNAL MEDICINE
Payer: MEDICARE

## 2020-03-12 DIAGNOSIS — R26.9 GAIT ABNORMALITY: ICD-10-CM

## 2020-03-12 DIAGNOSIS — G81.10 SPASTIC HEMIPARESIS: ICD-10-CM

## 2020-03-12 DIAGNOSIS — R68.89 IMPAIRED FUNCTION OF UPPER EXTREMITY: ICD-10-CM

## 2020-03-12 DIAGNOSIS — Z74.09 IMPAIRED FUNCTIONAL MOBILITY, BALANCE, GAIT, AND ENDURANCE: ICD-10-CM

## 2020-03-12 DIAGNOSIS — R27.9 LACK OF COORDINATION AS LATE EFFECT OF CEREBROVASCULAR ACCIDENT (CVA): ICD-10-CM

## 2020-03-12 DIAGNOSIS — Z91.81 AT HIGH RISK FOR FALLS: ICD-10-CM

## 2020-03-12 DIAGNOSIS — I69.398 LACK OF COORDINATION AS LATE EFFECT OF CEREBROVASCULAR ACCIDENT (CVA): ICD-10-CM

## 2020-03-12 PROCEDURE — 97112 NEUROMUSCULAR REEDUCATION: CPT | Mod: PN,CQ

## 2020-03-12 PROCEDURE — 97112 NEUROMUSCULAR REEDUCATION: CPT | Mod: PN

## 2020-03-12 PROCEDURE — 97110 THERAPEUTIC EXERCISES: CPT | Mod: PN

## 2020-03-12 PROCEDURE — 97110 THERAPEUTIC EXERCISES: CPT | Mod: PN,CQ

## 2020-03-12 NOTE — PROGRESS NOTES
"  Physical Therapy Daily Treatment Note     Name: Caitlyn Perez  Clinic Number: [id number]    Therapy Diagnosis:   Encounter Diagnoses   Name Primary?    Impaired functional mobility, balance, gait, and endurance     At high risk for falls     Gait abnormality     Spastic hemiparesis      Physician: Lizeth Isaacs MD    Visit Date: 3/12/2020    Physician Orders: PT Eval and Treat for gait training  Medical Diagnosis from Referral:   R26.9 (ICD-10-CM) - Gait abnormality   G81.10 (ICD-10-CM) - Spastic hemiparesis   Z91.81 (ICD-10-CM) - At high risk for falls    Evaluation Date: 3/6/2020  Authorization Period Expiration: 4/18/2020  Plan of Care Expiration: 4/17/2020  Visit # / Visits authorized: 1/ 12    Time In: 1000  Time Out: 1055  Total Billable Time: 55 minutes    Precautions: Fall    Subjective     Pt reports: Having back pain with use of quad cane, might be too low.  She had not received home exercise program.  Response to previous treatment: no problem  Functional change: none stated    Pain: 0/10    Objective     Caitlyn received therapeutic exercises to develop strength and endurance for 25 minutes including:    NuStep Lv3 10'  Sit ex 3x10: ankle pumps, Ball squeeze, LAQ and Hip flexion alternating R/L    Caitlyn participated in neuromuscular re-education activities to improve: Balance, Coordination, Kinesthetic, Sense, Proprioception and Posture for 30 minutes. The following activities were included:    Step Up 6" box x30 L WB (cues to unlock L knee with R LE descent)  Floor ladder // bars 12' x20 with decreased R UE support  Gait with ' SBA     Adjusted quad cane to appropriate height    Home Exercises Provided and Patient Education Provided     Education provided:   - Decreased speed with increased range    Written Home Exercises Provided: yes.  Exercises were reviewed and Caitlyn was able to demonstrate them prior to the end of the session.  Caitlyn demonstrated good  understanding of the " "education provided.     See EMR under Media for exercises provided 3/12/2020.    Assessment     Patient tolerated activities with minimal rest breaks, good follow through with cueing.    Caitlyn is progressing towards her goals.   Pt prognosis is Good.     Pt will continue to benefit from skilled outpatient physical therapy to address the deficits listed in the problem list box on initial evaluation, provide pt/family education and to maximize pt's level of independence in the home and community environment.     Pt's spiritual, cultural and educational needs considered and pt agreeable to plan of care and goals.     Anticipated barriers to physical therapy: none    Goals:     Short Term Goals: 3 weeks               1) Facilitate improved posture with equal weight bearing in standing              2) Facilitate improved LE flexibility                   3) Patient will demonstrate improved active movement of L LE to assist with ADLs and positioning              4) Facilitate improved L foot clearance during ambulation.       Long Term Goals: 6 weeks               1) Patient will achieve near equal weight shift during ambulation to facilitate improved foot clearance              2) Patient will consistently perform sit <> stand transfers safely with minimal use of UE               3) Patient will safely navigate 4-6" steps with appropriate use of hand rails appropriate pattern              4) Improve functional limitation rating as indicated by FOTO to < 40%               5) Patient will be independent in HEP.         Plan     Continue with POC to increase balance activities as patient tolerates.    Marycruz Hernandez, PTA   "

## 2020-03-12 NOTE — TELEPHONE ENCOUNTER
Attempted to contact patient and confirm appointment for tomorrow but patient did not answer. Left patient a voicemail stating the purpose for the call. Awaiting a call back.

## 2020-03-12 NOTE — PROGRESS NOTES
"  Occupational Therapy Daily Treatment Note     Date: 3/12/2020  Name: Caitlyn Perez  Clinic Number: [id number]    Therapy Diagnosis:   Encounter Diagnoses   Name Primary?   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 Her son has the flu and was diagnosed yesterday. He was also tested for Covid19, but results are not available at this time. Caitlyn states that she has "sprayed everything down" at home and in the car to minimize her chances of getting sick.   She left her orthosis in the car and the palmar aspect has "melted."  Response to previous treatment: no concerns from tx   Functional change: no changes noted since yesterday.     Pain: 0/10  Location:  N/a: no c/o pain today.     Objective     Caitlyn received the following manual therapy techniques for 5 minutes:   -Gentle mobilization of joints involving L UE phalanges, metacarpals, carpals, radius and ulna.    Caitlyn received therapeutic exercises for 25 minutes including:    -Gentle stretching of the soft tissues of the L wrist and hand to decrease edema and improve PROM, potentially allowing for increases in active movement.   -Placed flat paddle on pt's hand for duration of treatment to inhibit flexor tone and provide LLPS to long finger flexors.  -With docking station on, OT perf'd gentle mobilization and stretching of soft-tissues throughout the shoulder, including levator, serratus " anterior, subscapularis, anterior complex, lats and combined internal rotators to allow for increased PROM.  -with paddle and docking station, completed stretch to ER@45*abd, 15x30s w/ mod A. Mod cues to allow the L UE to relax, yoga mat with wedge positioned to ensure arm not riding up toward abduction and OT also provided CGA to ensure correct positioning. She was able to attain 23* PROM ER@45*abd.    Caitlyn participated in dynamic functional therapeutic activities to improve functional performance for 0  minutes, including:  -n/a    Caitlyn participated in neuromuscular re-education activities for 15 minutes to improve: Balance, Coordination, Kinesthetic Sense    -AROM a/p pelvic tilt, R/L lateral pelvic tilt. Hands at sides in position of support to facilitate scapulo-pelvic rhythm and Caitlyn actively pushing through the L UE throughout task.   -with CGA and docking station, perf'd shaun scap protraction to improve amaris-scapular rhythm, 2x10 w/ mod cues and min A.  -AROM shaun scap depression 2x10; shaun scap retraction to improve amaris-scapular rhythm    After session, OT repaired orthosis as possible and padded it.    Home Exercises and Education Provided     Education provided:   - reviewed wear/care of orthosis, joseluis: don't leave it in the hot car.  - Progress towards goals     Written Home Exercises Provided: Patient instructed to cont prior HEP.  Exercises were reviewed and Caitlyn was able to demonstrate them prior to the end of the session.  Caitlyn demonstrated good  understanding of the HEP provided.   .   See EMR under Patient Instructions for exercises provided 2/26/2020.        Assessment     Despite being left in the car, Caitlyn's orthosis still fit in most areas. OT had to fix area by fingertips to decrease pressure, as well as pad thumb web space and palm. Improved ability to push through the L UE during pelvic tilting today following yesterday's session focusing on stabilization with the L UE. She is still  having difficulty with stretching to ER@45*abd with cane and we will need to continue to address this as increased PROM toward external rotation is necessary for allowing increased shoulder flexion, as well as increased function of the external rotators for stabilizing the arm during shoulder flexion.    Caitlyn is progressing well towards her goals and there are no updates to goals at this time. Pt prognosis is Fair.    Pt will continue to benefit from skilled outpatient occupational therapy to address the deficits listed in the problem list on initial evaluation provide pt/family education and to maximize pt's level of independence in the home and community environment.     Anticipated barriers to occupational therapy: possible transportation concerns, 10 years since initial onset of symptoms    Pt's spiritual, cultural and educational needs considered and pt agreeable to plan of care and goals.    Goals:  Short Term Goals: 4 weeks   1) Caitlyn will be mod I with initial HEP. (progressing 3/12/2020)  2) Caitlyn will demo improved PROM ER of L shoulder by at least 10* at each of 3 positions supine. (progressing 3/12/2020)  3) Caitlyn will demo elbow extension -30 or better during L UE reaching. (progressing 3/12/2020)     Long Term Goals: 12 weeks   1) Caitlyn will be able to make her bed with shaun UE and modified techniques as necessary. (progressing 3/12/2020)  2) Caitlyn will be able to sweep her floor with modified techniques as necessary. (progressing 3/12/2020)  3) Caitlyn will demo improved L UE function, as evidenced by a FTHUE score of 6/7. (progressing 3/12/2020)  4) Caitlyn will be mod I with d/c HEP. (progressing 3/12/2020)  5) Caitlyn will demo elbow extension -15 or better AROM during reaching. (progressing 3/12/2020)  6) Caitlyn will demo PROM ER of L shoulder of at least 45* at each of 3 positions supine for increased fxl use of the L shoulder. (progressing 3/12/2020)    Plan     Updates/Grading for next  session: modify orthosis as necessaryconsider KT to inhibit FDS and to inhibit subscapularis; continue stretching, increase functional use of the L UE, joseluis as a support during standing activity. She is interested in [facility]. Will pursue orders.    Myranda Valencia, OT

## 2020-03-13 ENCOUNTER — OFFICE VISIT (OUTPATIENT)
Dept: HEPATOLOGY | Facility: CLINIC | Age: 55
End: 2020-03-13
Payer: MEDICARE

## 2020-03-13 ENCOUNTER — TELEPHONE (OUTPATIENT)
Dept: OBSTETRICS AND GYNECOLOGY | Facility: CLINIC | Age: 55
End: 2020-03-13

## 2020-03-13 ENCOUNTER — PROCEDURE VISIT (OUTPATIENT)
Dept: HEPATOLOGY | Facility: CLINIC | Age: 55
End: 2020-03-13
Payer: MEDICARE

## 2020-03-13 ENCOUNTER — TELEPHONE (OUTPATIENT)
Dept: HEPATOLOGY | Facility: CLINIC | Age: 55
End: 2020-03-13

## 2020-03-13 VITALS
DIASTOLIC BLOOD PRESSURE: 92 MMHG | BODY MASS INDEX: 35.34 KG/M2 | TEMPERATURE: 98 F | HEIGHT: 64 IN | WEIGHT: 207 LBS | HEART RATE: 82 BPM | RESPIRATION RATE: 18 BRPM | OXYGEN SATURATION: 99 % | SYSTOLIC BLOOD PRESSURE: 180 MMHG

## 2020-03-13 DIAGNOSIS — K76.0 NAFLD (NONALCOHOLIC FATTY LIVER DISEASE): Primary | ICD-10-CM

## 2020-03-13 DIAGNOSIS — K76.0 FATTY LIVER: ICD-10-CM

## 2020-03-13 DIAGNOSIS — K80.21 CALCULUS OF GALLBLADDER WITH BILIARY OBSTRUCTION BUT WITHOUT CHOLECYSTITIS: Primary | ICD-10-CM

## 2020-03-13 DIAGNOSIS — K76.0 NAFLD (NONALCOHOLIC FATTY LIVER DISEASE): ICD-10-CM

## 2020-03-13 PROCEDURE — 3080F DIAST BP >= 90 MM HG: CPT | Mod: CPTII,S$GLB,, | Performed by: INTERNAL MEDICINE

## 2020-03-13 PROCEDURE — 99215 OFFICE O/P EST HI 40 MIN: CPT | Mod: S$GLB,,, | Performed by: INTERNAL MEDICINE

## 2020-03-13 PROCEDURE — 91200 LIVER ELASTOGRAPHY: CPT | Mod: S$GLB,,, | Performed by: INTERNAL MEDICINE

## 2020-03-13 PROCEDURE — 3008F PR BODY MASS INDEX (BMI) DOCUMENTED: ICD-10-PCS | Mod: CPTII,S$GLB,, | Performed by: INTERNAL MEDICINE

## 2020-03-13 PROCEDURE — 99215 PR OFFICE/OUTPT VISIT, EST, LEVL V, 40-54 MIN: ICD-10-PCS | Mod: S$GLB,,, | Performed by: INTERNAL MEDICINE

## 2020-03-13 PROCEDURE — 99999 PR PBB SHADOW E&M-EST. PATIENT-LVL III: CPT | Mod: PBBFAC,,, | Performed by: INTERNAL MEDICINE

## 2020-03-13 PROCEDURE — 99999 PR PBB SHADOW E&M-EST. PATIENT-LVL III: ICD-10-PCS | Mod: PBBFAC,,, | Performed by: INTERNAL MEDICINE

## 2020-03-13 PROCEDURE — 3077F SYST BP >= 140 MM HG: CPT | Mod: CPTII,S$GLB,, | Performed by: INTERNAL MEDICINE

## 2020-03-13 PROCEDURE — 3080F PR MOST RECENT DIASTOLIC BLOOD PRESSURE >= 90 MM HG: ICD-10-PCS | Mod: CPTII,S$GLB,, | Performed by: INTERNAL MEDICINE

## 2020-03-13 PROCEDURE — 3077F PR MOST RECENT SYSTOLIC BLOOD PRESSURE >= 140 MM HG: ICD-10-PCS | Mod: CPTII,S$GLB,, | Performed by: INTERNAL MEDICINE

## 2020-03-13 PROCEDURE — 3008F BODY MASS INDEX DOCD: CPT | Mod: CPTII,S$GLB,, | Performed by: INTERNAL MEDICINE

## 2020-03-13 PROCEDURE — 91200 PR LIVER ELASTOGRAPHY W/OUT IMAG W/INTERP & REPORT: ICD-10-PCS | Mod: S$GLB,,, | Performed by: INTERNAL MEDICINE

## 2020-03-13 NOTE — Clinical Note
Fibroscan Procedure Name: Caitlyn Dubois of Procedure : 2020 :: Shilpa Borges, HAYDERiagnosis: NAFLDProbe: XLFibroscan readin.0 KPaFibrosis:no fibrosis / minimal fibrosisCAP readin dB/mSteatosis: :S3 = >67% fat Compared to previous Fibroscan on 18, when KPa was 4.9 and CAP was 320, there is some decrease in fat and stable fibrosis. Recommended low carb diet, she loves rice so eats it every day and a lot. (two bowls with red beans, and with vegetables), I have recommended eating half the amount of carbs.

## 2020-03-13 NOTE — LETTER
March 13, 2020      Lizeth Isaacs MD  [address]  [facility] [address]           [facility] - Hepatology  [address]  [facility] [address]  Phone: [phone]  Fax: [phone]          Patient: Caitlyn Perez   MR Number: [id number]   YOB: 1965   Date of Visit: 3/13/2020       Dear Dr. Lizeth Isaacs:    Thank you for referring Caitlyn Perez to me for evaluation. Attached you will find relevant portions of my assessment and plan of care.    If you have questions, please do not hesitate to call me. I look forward to following Caitlyn Perez along with you.    Sincerely,    Shilpa Borges MD    Enclosure  CC:  No Recipients    If you would like to receive this communication electronically, please contact [email or web address] or [phone] to request more information on [employer] access.    For providers and/or their staff who would like to refer a patient to [facility], please contact us through our one-stop-shop provider referral line, [facility], at [phone].    If you feel you have received this communication in error or would no longer like to receive these types of communications, please e-mail [email or web address]

## 2020-03-13 NOTE — PATIENT INSTRUCTIONS
Fibroscan Procedure     Name: Caitlyn Perez  Date of Procedure : 2020   :: Shilpa Borges MD  Diagnosis: NAFLD    Probe: XL    Fibroscan readin.0 KPa    Fibrosis:no fibrosis / minimal fibrosis    CAP readin dB/m    Steatosis: :S3 = >67% fat     Compared to previous Fibroscan on 18, when KPa was 4.9 and CAP was 320, there is some decrease in fat and stable fibrosis.     Recommended low carb diet, she loves rice so eats it every day and a lot. (two bowls with red beans, and with vegetables), I have recommended eating half the amount of carbs.

## 2020-03-13 NOTE — PATIENT INSTRUCTIONS
Recommendations:  -  Labs: none  -  Fibroscan today-   -  Low Carbohydrate Diet:  Limit intake of listed foods (below) to half of current intake.    Foods to limit are: Rice, potatoes, corn, corn starch- and flour-containing food products (e.g., breads, pastas, cookies, cakes, etc.), sweets and fruits.  Avoid alcohol.    Proteins can be substituted in place of carbs.  Generally fish is good as a source of fish oil.     -  Return PRN.

## 2020-03-13 NOTE — PROCEDURES
Procedures   Fibroscan Procedure     Name: Caitlyn Perez  Date of Procedure : 2020   :: Shilpa Borges MD  Diagnosis: NAFLD    Probe: XL    Fibroscan readin.0 KPa    Fibrosis:no fibrosis / minimal fibrosis    CAP readin dB/m    Steatosis: :S3 = >67% fat     Compared to previous Fibroscan on 18, when KPa was 4.9 and CAP was 320, there is some decrease in fat and stable fibrosis.     Recommended low carb diet, she loves rice so eats it every day and a lot. (two bowls with red beans, and with vegetables), I have recommended eating half the amount of carbs.

## 2020-03-13 NOTE — PROGRESS NOTES
"   [facility] Consultation Note    Reason for Consult:  The primary encounter diagnosis was Calculus of gallbladder with biliary obstruction but without cholecystitis. A diagnosis of Fatty liver was also pertinent to this visit.    PCP: Lizeth Isaacs   [address] / [place] [address]    HPI:  This is a 54 y.o. female here for evaluation of: Fatty liver, elevated liver enzymes.    Gaining wt 20 lb in 2 years. Generally feels "great".  Has not been limiting activity (has 6 kids), but less active now compared to prior to left sided hemiparesis from a stroke 2010, had hypertensive crisis (/>100) with IC bleeding from being upset over difficulties with family situation.       Patient was seen by Justus Llanos in 6/2018, and had Fibroscan (6/5/18) showed KPa 4.9 = F0-F1 fibrosis, and , = S3 or >67% steatosis.     Risk factors for fatty liver: HTN, diabetes (borderline, controlled with diet and exercise), denies HLD, polycystic ovarian disease.   Does not drink alcohol.     Loves pizza, lasagna, red beans and rice (eats rice every day). Her children cook for her.      Elevated liver enzymes: Yes  Abnormal imaging: No  Cirrhosis: No  Hepatitis C: No  Hepatitis B: No  Fatty liver: Yes  Encephalopathy: No  Post-hospital discharge: No  Symptoms: none    Primary hepatic manifestations:  Fatigue:No  Edema:No  Ascites:No  Encephalopathy:No  Abdominal pain:No  GI bleeds: No  Pruritus:No  Weight Changes:Yes  Changes in Bowel habits: No  Muscle cramps:No    Risk factors for liver disease:  No jaundice  No transfusions  No IVDU  Did not snort cocaine or similar agents  Did not live with anyone with hepatitis B or C  Sexual partner not tested  No hepatotoxic medications  No exposure to industrial toxins  Alcohol: none      ROS:  Constitutional: No fevers, chills, weight changes, fatigue  ENT: No allergies, nosebleeds,   CV: No chest pain  Pulm: No cough, shortness of breath  Ophtho: No vision " changes  GI/Liver: see HPI  Derm: No rash, itching  Heme: No swollen glands, bruising  MSK: No joint pains, joint swelling  : No dysuria, hematuria, decrease in urine output  Endo: No hot or cold intolerance  Neuro: No confusion, disorientation, difficulty with sleep, memory, concentration, syncope, seizure  Psych: No anxiety, depression    Medical History:  has a past medical history of Abnormal Pap smear of cervix (), Aneurysm, Family history of breast cancer, Hypertension, Parathyroid disease, and Stroke.    Surgical History:  has a past surgical history that includes  section; Tubal ligation; Colonoscopy (N/A, 2016); Colonoscopy (N/A, 2017); and Colonoscopy (N/A, 3/10/2020).    Family History: family history includes Breast cancer in her paternal grandmother and paternal uncle; Breast cancer (age of onset: 17) in her paternal aunt; Breast cancer (age of onset: 83) in her father; Diabetes in her brother, maternal aunt, maternal aunt, maternal aunt, maternal aunt, maternal uncle, maternal uncle, maternal uncle, maternal uncle, and mother; Diabetes type II in her mother; Hypertension in her mother; Kidney disease in her mother; Lung cancer in her maternal aunt..     Social History:  reports that she has never smoked. She has never used smokeless tobacco. She reports that she drinks alcohol. She reports that she does not use drugs.    Review of patient's allergies indicates:  No Known Allergies    Current Outpatient Rx   Medication Sig Dispense Refill    amLODIPine (NORVASC) 10 MG tablet TAKE 1 TABLET(10 MG) BY MOUTH EVERY DAY 90 tablet 3    amLODIPine (NORVASC) 10 MG tablet Take 1 tablet by mouth once daily 120 tablet 0    aspirin (ECOTRIN) 81 MG EC tablet Take 81 mg by mouth once daily.      blood sugar diagnostic Strp True Test blood glucose test strips & lancets. Check blood sugars daily. 3 month supply. 100 each 3    CYANOCOBALAMIN, VITAMIN B-12, (VITAMIN B-12 ORAL) Take 1 tablet by  "mouth once daily.      GAVILYTE--22.74-6.74 -5.86 gram suspension       irbesartan (AVAPRO) 150 MG tablet Take 1 tablet (150 mg total) by mouth every evening. 90 tablet 3    irbesartan (AVAPRO) 150 MG tablet Take 1 tablet by mouth every evening 120 tablet 0    KRILL OIL ORAL Take 1 capsule by mouth once daily.      lidocaine (LIDODERM) 5 % Place 1 patch onto the skin daily as needed. Remove & Discard patch within 12 hours or as directed by MD 30 patch 1    LUTEIN ORAL Take by mouth once daily.      MULTIVITAMIN W-MINERALS/LUTEIN (CENTRUM SILVER ORAL) Take 1 tablet by mouth once daily.      ondansetron (ZOFRAN) 4 MG tablet Take 1 tablet (4 mg total) by mouth every 6 (six) hours as needed for Nausea. 12 tablet 0    walker Misc 1 each by Misc.(Non-Drug; Combo Route) route daily as needed. 1 each 0    walker Misc 1 each by Misc.(Non-Drug; Combo Route) route once daily. rollator 1 each 0       Objective Findings:    Vital Signs:  BP (!) 180/92 (BP Location: Left arm, Patient Position: Sitting, BP Method: Medium (Automatic))   Pulse 82   Temp 98 °F (36.7 °C) (Oral)   Resp 18   Ht 5' 4" (1.626 m)   Wt 93.9 kg (207 lb 0.2 oz)   SpO2 99%   BMI 35.53 kg/m²   Body mass index is 35.53 kg/m².    Physical Exam:  General Appearance: Well appearing in no acute distress  Head:   Normocephalic, without obvious abnormality  Eyes:    No scleral icterus, EOMI  ENT: Neck supple, Lips, mucosa, and tongue normal; teeth and gums normal  Lungs: CTA bilaterally in anterior and posterior fields, no wheezes, no crackles.  Heart:  Regular rate and rhythm, S1, S2 normal, no murmurs heard  Abdomen: Soft, non tender, non distended with positive bowel sounds in all four quadrants. No hepatosplenomegaly, ascites, or mass  Extremities: 2+ pulses, no clubbing, cyanosis or edema  Skin: No rash  Neurologic: CN II-XII intact, alert, oriented x 3. No asterixis      Labs:  Lab Results   Component Value Date    WBC 7.12 01/21/2020    " HGB 13.1 01/21/2020    HCT 42.6 01/21/2020     (H) 01/21/2020    CHOL 215 (H) 01/21/2020    TRIG 116 01/21/2020    HDL 59 01/21/2020    INR 1.0 03/05/2018    CREATININE 1.1 02/21/2020    BUN 11 02/21/2020    BILITOT 0.3 02/21/2020    ALT 37 02/21/2020    AST 32 02/21/2020    ALKPHOS 126 02/21/2020     02/21/2020    K 3.8 02/21/2020     02/21/2020    CO2 28 02/21/2020    TSH 1.786 01/21/2020    HGBA1C 5.9 (H) 01/21/2020       Imaging:     CT 3/28/19:   1. No acute intra-abdominal abnormalities identified.  No evidence of appendicitis.  2. Cholelithiasis.  3. Nonspecific 5 cm cystic splenic lesion which demonstrates mild peripheral calcification.  4. Additional findings as detailed above.    Endoscopy:      Assessment:  1. Calculus of gallbladder with biliary obstruction but without cholecystitis    2. Fatty liver    Feels fine, has no symptoms.  Fibroscan 6/2018 showed >67% fat in the liver, but no fibrosis.   Today's fibroscan shows  (compared to 320 last time), = >67% fat.  KPa 6.0 (slightly higher than previous (4.9) , stable = F0-F1 or none/minimal).   Will repeat fibroscan today.        Recommendations:  -  Labs: none  -  Fibroscan today-   -  Low Carbohydrate Diet:  Limit intake of listed foods (below) to half of current intake.    Foods to limit are: Rice, potatoes, corn, corn starch- and flour-containing food products (e.g., breads, pastas, cookies, cakes, etc.), sweets and fruits.  Avoid alcohol.    Proteins can be substituted in place of carbs.  Generally fish is good as a source of fish oil.     -  Return PRN.     ADDENDUM:   Today's fibroscan shows  (compared to 320 last time), = >67% fat.  KPa 6.0 (slightly higher than previous (4.9) , stable = F0-F1 or none/minimal).   Results reviewed with patient in clinic.       Follow up if symptoms worsen or fail to improve.      Order summary:  Orders Placed This Encounter   Procedures    FibroScan (Vibration Controlled Transient  Elastography)       Thank you so much for allowing me to participate in the care of Caitlyn Borges MD

## 2020-03-13 NOTE — Clinical Note
Recommendations:-  Labs: none-  Fibroscan today- -  Low Carbohydrate Diet:Limit intake of listed foods (below) to half of current intake.  Foods to limit are: Rice, potatoes, corn, corn starch- and flour-containing food products (e.g., breads, pastas, cookies, cakes, etc.), sweets and fruits.Avoid alcohol.  Proteins can be substituted in place of carbs.  Generally fish is good as a source of fish oil.   -  Return PRN.

## 2020-03-13 NOTE — TELEPHONE ENCOUNTER
----- Message from Colette Ryan sent at 3/13/2020  7:59 AM CDT -----  Contact: Pt   Pt requesting to schedule a Annual visit    Please call and advise      Phone [phone]

## 2020-03-16 ENCOUNTER — TELEPHONE (OUTPATIENT)
Dept: HEPATOLOGY | Facility: CLINIC | Age: 55
End: 2020-03-16

## 2020-03-16 ENCOUNTER — CLINICAL SUPPORT (OUTPATIENT)
Dept: REHABILITATION | Facility: HOSPITAL | Age: 55
End: 2020-03-16
Attending: INTERNAL MEDICINE
Payer: MEDICARE

## 2020-03-16 DIAGNOSIS — I69.398 LACK OF COORDINATION AS LATE EFFECT OF CEREBROVASCULAR ACCIDENT (CVA): ICD-10-CM

## 2020-03-16 DIAGNOSIS — R26.9 GAIT ABNORMALITY: ICD-10-CM

## 2020-03-16 DIAGNOSIS — G81.10 SPASTIC HEMIPARESIS: ICD-10-CM

## 2020-03-16 DIAGNOSIS — Z91.81 AT HIGH RISK FOR FALLS: ICD-10-CM

## 2020-03-16 DIAGNOSIS — Z74.09 IMPAIRED FUNCTIONAL MOBILITY, BALANCE, GAIT, AND ENDURANCE: ICD-10-CM

## 2020-03-16 DIAGNOSIS — R68.89 IMPAIRED FUNCTION OF UPPER EXTREMITY: ICD-10-CM

## 2020-03-16 DIAGNOSIS — R27.9 LACK OF COORDINATION AS LATE EFFECT OF CEREBROVASCULAR ACCIDENT (CVA): ICD-10-CM

## 2020-03-16 PROCEDURE — 97110 THERAPEUTIC EXERCISES: CPT | Mod: PN

## 2020-03-16 PROCEDURE — 97112 NEUROMUSCULAR REEDUCATION: CPT | Mod: PN

## 2020-03-16 PROCEDURE — 97530 THERAPEUTIC ACTIVITIES: CPT | Mod: PN

## 2020-03-16 PROCEDURE — 97110 THERAPEUTIC EXERCISES: CPT | Mod: PN,CQ

## 2020-03-16 PROCEDURE — 97112 NEUROMUSCULAR REEDUCATION: CPT | Mod: PN,CQ

## 2020-03-16 NOTE — TELEPHONE ENCOUNTER
----- Message from Shilpa Borges MD sent at 3/13/2020  4:54 PM CDT -----  Fibroscan Procedure     Name: Caitlyn Perez  Date of Procedure : 2020   :: Shilpa Borges MD  Diagnosis: NAFLD    Probe: XL    Fibroscan readin.0 KPa    Fibrosis:no fibrosis / minimal fibrosis    CAP readin dB/m    Steatosis: :S3 = >67% fat     Compared to previous Fibroscan on 18, when KPa was 4.9 and CAP was 320, there is some decrease in fat and stable fibrosis.     Recommended low carb diet, she loves rice so eats it every day and a lot. (two bowls with red beans, and with vegetables), I have recommended eating half the amount of carbs.

## 2020-03-16 NOTE — PROGRESS NOTES
Occupational Therapy Daily Treatment Note     Date: 3/16/2020  Name: Caitlyn Perez  Clinic Number: [id number]    Therapy Diagnosis:   Encounter Diagnoses   Name Primary?   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 No areas of pressure noted with resting orthosis for night use. She said it was comfortable & worked well. She wants to be able to keep her hand flat at home.  Response to previous treatment: no concerns from tx   Functional change: no changes noted since yesterday.     Pain: 0/10  Location:  N/a: no c/o pain today.     Objective     Caitlyn received the following manual therapy techniques for 5 minutes:   -Gentle mobilization of joints involving L UE phalanges, metacarpals, carpals, radius and ulna.    Caitlyn received therapeutic exercises for 20 minutes including:    -Gentle stretching of the soft tissues of the L wrist and hand to decrease edema and improve PROM, potentially allowing for increases in active movement.   -Placed flat paddle on pt's hand for duration of treatment to inhibit flexor tone and provide LLPS to long finger flexors.  -Added 70* docking station to inhibit over-recruitment of flexors and provide LLPS to wrist and long finger flexors.   -UBE x5 min forward/ 5 min back w/ shaun UE, level 1.0, using paddle attachment to allow for active engagement of the L UE.    Caitlyn participated in dynamic functional therapeutic  activities to improve functional performance for 10  minutes, including:  -OT fabricated flat paddle for pt to use at home to keep her hand straight and allow for active pushing through the L UE to increase her ability to use it for support.  -Caitlyn garcia'd ability to doff/don the paddle and how to incorporate it into function.    Caitlyn participated in neuromuscular re-education activities for 5 minutes to improve: Balance, Coordination, Kinesthetic Sense    -AROM a/p pelvic tilt, R/L lateral pelvic tilt. Hands at sides in position of support to facilitate scapulo-pelvic rhythm and Caitlyn actively pushing through the L UE throughout task. She was encouraged to over-emphasize movements in the L hip.    Home Exercises and Education Provided     Education provided:   - Progress towards goals     Written Home Exercises Provided: Patient instructed to cont prior HEP.  Exercises were reviewed and Caitlyn was able to demonstrate them prior to the end of the session.  Caitlyn demonstrated good  understanding of the HEP provided.   .   See EMR under Patient Instructions for exercises provided 2/26/2020.        Assessment     Caitlyn demlida'd ability to doff/don flat paddle and seems eager to be able to use the L UE for support at home. She continued to demo improved ability to push through the L UE today, but required more cues to remember to incorporate it.     Caitlyn is progressing well towards her goals and there are no updates to goals at this time. Pt prognosis is Fair.    Pt will continue to benefit from skilled outpatient occupational therapy to address the deficits listed in the problem list on initial evaluation provide pt/family education and to maximize pt's level of independence in the home and community environment.     Anticipated barriers to occupational therapy: possible transportation concerns, 10 years since initial onset of symptoms    Pt's spiritual, cultural and educational needs considered and pt agreeable  to plan of care and goals.    Goals:  Short Term Goals: 4 weeks   1) Caitlyn will be mod I with initial HEP. (progressing 3/16/2020)  2) Caitlyn will demo improved PROM ER of L shoulder by at least 10* at each of 3 positions supine. (progressing 3/16/2020)  3) Caitlyn will demo elbow extension -30 or better during L UE reaching. (progressing 3/16/2020)     Long Term Goals: 12 weeks   1) Caitlyn will be able to make her bed with shaun UE and modified techniques as necessary. (progressing 3/16/2020)  2) Caitlyn will be able to sweep her floor with modified techniques as necessary. (progressing 3/16/2020)  3) Caitlyn will demo improved L UE function, as evidenced by a FTHUE score of 6/7. (progressing 3/16/2020)  4) Caitlyn will be mod I with d/c HEP. (progressing 3/16/2020)  5) Caitlyn will demo elbow extension -15 or better AROM during reaching. (progressing 3/16/2020)  6) Caitlyn will demo PROM ER of L shoulder of at least 45* at each of 3 positions supine for increased fxl use of the L shoulder. (progressing 3/16/2020)    Plan     Updates/Grading for next session: stretching to ER; see how use of flat paddle is going. Increase functional use of the L UE, joseluis as a support during standing activity. She is interested in [facility]. Will pursue orders.    Myranda Valencia, OT

## 2020-03-16 NOTE — PATIENT INSTRUCTIONS
Supine With Partner        Lie on back, legs straight. Have partner slowly raise one leg. Hold 30 seconds.   Repeat 3 times per session. Do 2 sessions per day.    Copyright © I. All rights reserved.

## 2020-03-16 NOTE — PROGRESS NOTES
"  Physical Therapy Daily Treatment Note     Name: Caitlyn Perez  Clinic Number: [id number]    Therapy Diagnosis:   Encounter Diagnoses   Name Primary?    Impaired functional mobility, balance, gait, and endurance     At high risk for falls     Gait abnormality     Spastic hemiparesis      Physician: Lizeth Isaacs MD    Visit Date: 3/16/2020    Physician Orders: PT Eval and Treat for gait training  Medical Diagnosis from Referral:   R26.9 (ICD-10-CM) - Gait abnormality   G81.10 (ICD-10-CM) - Spastic hemiparesis   Z91.81 (ICD-10-CM) - At high risk for falls    Evaluation Date: 3/6/2020  Authorization Period Expiration: 4/18/2020  Plan of Care Expiration: 4/17/2020  Visit # / Visits authorized: 4/ 12    Time In: 1300  Time Out: 1400  Total Billable Time: 60 minutes    Precautions: Fall    Subjective     Pt reports: I was in too much of a hurry I left my AFO at home  She was complaint with home exercise program.  Response to previous treatment: no problem  Functional change: none stated    Pain: 0/10    Objective     Caitlyn received therapeutic exercises to develop strength and endurance for 25 minutes including:    NuStep Lv3 10'  Sit ex 3x10: ankle pumps, Ball squeeze, LAQ and Hip flexion alternating R/L, hip ER GTB, HS curl GTB  Hamstring stretch 3/30s        Caitlyn participated in neuromuscular re-education activities to improve: Balance, Coordination, Kinesthetic, Sense, Proprioception and Posture for 30 minutes. The following activities were included:    Step Up 6" box x30 L WB (cues to unlock L knee with R LE descent)  Side step 8ft x 4, [place] 8ft x 4, tandem gait 8ft x 4  Gait with QC  200' SBA     Adjusted quad cane to appropriate height    Home Exercises Provided and Patient Education Provided     Education provided:   - Decreased speed with increased range    Written Home Exercises Provided: yes. Added hamstring stretch 3/30s R/L via VHI today.  Exercises were reviewed and Caitlyn was able to " "demonstrate them prior to the end of the session.  Caitlyn demonstrated good  understanding of the education provided.     See EMR under Media for exercises provided 3/12/2020.    Assessment     Patient tolerated therex well with several R LE knee buckling episodes, cramps in calf throughout session, stretched on 1/2 roll to alleviate.    Caitlyn is progressing towards her goals.   Pt prognosis is Good.     Pt will continue to benefit from skilled outpatient physical therapy to address the deficits listed in the problem list box on initial evaluation, provide pt/family education and to maximize pt's level of independence in the home and community environment.     Pt's spiritual, cultural and educational needs considered and pt agreeable to plan of care and goals.     Anticipated barriers to physical therapy: none    Goals:     Short Term Goals: 3 weeks               1) Facilitate improved posture with equal weight bearing in standing              2) Facilitate improved LE flexibility                   3) Patient will demonstrate improved active movement of L LE to assist with ADLs and positioning              4) Facilitate improved L foot clearance during ambulation.       Long Term Goals: 6 weeks               1) Patient will achieve near equal weight shift during ambulation to facilitate improved foot clearance              2) Patient will consistently perform sit <> stand transfers safely with minimal use of UE               3) Patient will safely navigate 4-6" steps with appropriate use of hand rails appropriate pattern              4) Improve functional limitation rating as indicated by FOTO to < 40%               5) Patient will be independent in HEP.         Plan     Continue with POC to increase balance activities as patient tolerates.    Rosy Drake, PTA   "

## 2020-03-18 ENCOUNTER — CLINICAL SUPPORT (OUTPATIENT)
Dept: REHABILITATION | Facility: HOSPITAL | Age: 55
End: 2020-03-18
Attending: INTERNAL MEDICINE
Payer: MEDICARE

## 2020-03-18 DIAGNOSIS — R68.89 IMPAIRED FUNCTION OF UPPER EXTREMITY: ICD-10-CM

## 2020-03-18 DIAGNOSIS — I69.398 LACK OF COORDINATION AS LATE EFFECT OF CEREBROVASCULAR ACCIDENT (CVA): ICD-10-CM

## 2020-03-18 DIAGNOSIS — R27.9 LACK OF COORDINATION AS LATE EFFECT OF CEREBROVASCULAR ACCIDENT (CVA): ICD-10-CM

## 2020-03-18 PROCEDURE — 97112 NEUROMUSCULAR REEDUCATION: CPT | Mod: PN

## 2020-03-18 PROCEDURE — 97110 THERAPEUTIC EXERCISES: CPT | Mod: PN

## 2020-03-20 ENCOUNTER — PATIENT MESSAGE (OUTPATIENT)
Dept: ENDOCRINOLOGY | Facility: CLINIC | Age: 55
End: 2020-03-20

## 2020-03-20 LAB — FINAL PATHOLOGIC DIAGNOSIS: NORMAL

## 2020-03-20 NOTE — PROGRESS NOTES
"  Occupational Therapy Daily Treatment Note     Date: 3/18/2020  Name: Caitlyn Perez  Clinic Number: [id number]    Therapy Diagnosis:   Encounter Diagnoses   Name Primary?   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 She arrived wearing her night time orthosis today. She says she is wearing the night orthosis during the day and the flat paddle at night to "keep her fingers straight."  Response to previous treatment: no concerns from tx   Functional change: no changes noted since yesterday.     Pain: 0/10  Location:  N/a: no c/o pain today.     Objective     Caitlyn received the following manual therapy techniques for 5 minutes:   -Gentle mobilization of joints involving L UE phalanges, metacarpals, carpals, radius and ulna.    Caitlyn received therapeutic exercises for 30 minutes including:    -Gentle stretching of the soft tissues of the L wrist and hand to decrease edema and improve PROM, potentially allowing for increases in active movement.   -Placed flat paddle on pt's hand for duration of treatment to inhibit flexor tone and provide LLPS to long finger flexors.  -Added 70* docking station to inhibit over-recruitment of flexors and provide LLPS to wrist and long finger flexors.   -UBE x10 min forward/ 10 min back w/ shaun UE, level 1.0, using paddle attachment to allow for active engagement of the L UE. She was encouraged to depress L scap, as " well as retract during each rotation at point of shoulder extension to improve scapulo-humeral rhythm.  -supine stretching to ER@45*abd. Caitlyn required max cues to ensure correct positioning of the arm, 15x20s.    Caitlyn participated in dynamic functional therapeutic activities to improve functional performance for 0 minutes, including:  -n/a     Caitlyn participated in neuromuscular re-education activities for 10 minutes to improve: Balance, Coordination, Kinesthetic Sense    -AROM a/p pelvic tilt, R/L lateral pelvic tilt. Hands at sides in position of support to facilitate scapulo-pelvic rhythm and Caitlyn actively pushing through the L UE throughout task. She was encouraged to over-emphasize movements in the L hip to increase accuracy of movements.  -supine for shaun chest press w/ CGA, max cues to encourage elbow extension, 2x10.    Home Exercises and Education Provided     Education provided:   - Progress towards goals   - ed that flat paddle is for day use to allow increased function of the L UE in a role of support. Don't wear the night orthosis during the day as it will decrease L UE function and increase stiffness. This is only for night use to prevent the hand from curling into a fist. She v/u.    Written Home Exercises Provided: Patient instructed to cont prior HEP.  Exercises were reviewed and Caitlyn was able to demonstrate them prior to the end of the session.  Caitlyn demonstrated good  understanding of the HEP provided.   .   See EMR under Patient Instructions for exercises provided 2/26/2020.        Assessment     Caitlyn continues to have significant tightness in her internal rotators, preventing overhead movement with safe and effective scapulo-humeral rhythm. She also seems to have some difficulty with recall of which brace/orthosis to wear when for the maximum functional gain. Max cues for actively pushing through the L UE during sit<>stand as well indicate decreased carry-over of therapeutic  principles geared toward improving function.    Caitlyn is progressing slowly towards her goals and there are no updates to goals at this time. Pt prognosis is Fair.    Pt will continue to benefit from skilled outpatient occupational therapy to address the deficits listed in the problem list on initial evaluation provide pt/family education and to maximize pt's level of independence in the home and community environment.     Anticipated barriers to occupational therapy: possible transportation concerns, 10 years since initial onset of symptoms    Pt's spiritual, cultural and educational needs considered and pt agreeable to plan of care and goals.    Goals:  Short Term Goals: 4 weeks   1) Caitlyn will be mod I with initial HEP. (progressing 3/20/2020)  2) Caitlyn will demo improved PROM ER of L shoulder by at least 10* at each of 3 positions supine. (progressing 3/20/2020)  3) Caitlyn will demo elbow extension -30 or better during L UE reaching. (progressing 3/20/2020)     Long Term Goals: 12 weeks   1) Caitlyn will be able to make her bed with shaun UE and modified techniques as necessary. (progressing 3/20/2020)  2) Caitlyn will be able to sweep her floor with modified techniques as necessary. (progressing 3/20/2020)  3) Caitlyn will demo improved L UE function, as evidenced by a FTHUE score of 6/7. (progressing 3/20/2020)  4) Caitlyn will be mod I with d/c HEP. (progressing 3/20/2020)  5) Caitlyn will demo elbow extension -15 or better AROM during reaching. (progressing 3/20/2020)  6) Caitlyn will demo PROM ER of L shoulder of at least 45* at each of 3 positions supine for increased fxl use of the L shoulder. (progressing 3/20/2020)    Plan     Updates/Grading for next session: continue stretching to ER;  Increase functional use of the L UE, joseluis as a support during standing activity. She is interested in [facility]. Will pursue orders.    Myranda Valencia OT

## 2020-03-22 NOTE — PROGRESS NOTES
Repeat colonoscopy in 5 years       If you have any questions or if I can clarify any of the above please contact me:    [employer] [phone]   Pager [phone]  email [email or web address]  Nurse Snow Schmitt [phone]   Kate Sloan:  [phone]    Sincerely  H, Narciso Miranda MD, FACS, FASCRS  Staff Surgeon  Dept of Colon and Rectal Surgery

## 2020-03-22 NOTE — PROGRESS NOTES
Repeat colonoscopy in 5 years       If you have any questions or if I can clarify any of the above please contact me:    [employer] [phone]   Pager [phone]  email [email or web address]  Nurse Snow Schmitt [phone]   Kate Sloan:  [phone]    Sincerely  H, Narciso Miranda MD, FACS, FASCRS  Staff Surgeon  Dept of Colon and Rectal Surgery

## 2020-03-23 ENCOUNTER — CLINICAL SUPPORT (OUTPATIENT)
Dept: REHABILITATION | Facility: HOSPITAL | Age: 55
End: 2020-03-23
Attending: INTERNAL MEDICINE
Payer: MEDICARE

## 2020-03-23 DIAGNOSIS — I69.398 LACK OF COORDINATION AS LATE EFFECT OF CEREBROVASCULAR ACCIDENT (CVA): ICD-10-CM

## 2020-03-23 DIAGNOSIS — R68.89 IMPAIRED FUNCTION OF UPPER EXTREMITY: ICD-10-CM

## 2020-03-23 DIAGNOSIS — R27.9 LACK OF COORDINATION AS LATE EFFECT OF CEREBROVASCULAR ACCIDENT (CVA): ICD-10-CM

## 2020-03-23 PROCEDURE — 97110 THERAPEUTIC EXERCISES: CPT | Mod: PN

## 2020-03-23 PROCEDURE — 97112 NEUROMUSCULAR REEDUCATION: CPT | Mod: PN

## 2020-03-23 NOTE — PATIENT INSTRUCTIONS
"  Stretch to External Rotation on Table        Place affected arm on table,  with elbow slightly bent. Gently lean forward until you feel a stretch in the shoulder. You should only feel a stretch at no pain. Hold 30 seconds. 10 reps per set, 2 sets per day, 7 days per week.     When you can lean as far as he is going in the picture, then return to the stretch lying on your back (see below.)      Stretch to External Rotation with Shoulder at 45*      Lying on your back, position your left shoulder at 45* ("V"-shape) and your elbow at 90* ("L"-shape). You can place a towel roll under your elbow. Holding a cane in both hands, gently use the right  side to push the left  hand away from the body into external rotation. You should only feel a stretch, no pain. Hold 30 seconds. 10 reps per set, 3 sets per day, 7 days per week.      Sit on a hard surface and position your hands at your sides. Try and keep both hands flat on the surface at your side.  (Elevate them if necessary using yoga blocks or shoe boxes stuffed with a pillow or blanket and taped up. Place a piece of  under and over the box/block to make sure it doesnt slip.)     Anterior and Posterior Pelvic Tilt  Roll your pelvis forward so that you sit up tall (anterior tilt) then roll your pelvis backward so youre slouched (posterior tilt). Make your movements as big as possible. Repeat about 20 times. Do 5 sets throughout the day.                                                Lateral Pelvic Tilt  Sitting up tall with an anterior pelvic tilt, lift your right hip up toward your right armpit (DO NOT lean way over to the side). Then return to neutral. Lift the left hip up toward your left armpit. Repeat about 20 times. Do 5 sets throughout the day.                                      "

## 2020-03-23 NOTE — PROGRESS NOTES
"  Occupational Therapy Daily Treatment Note     Date: 3/23/2020  Name: Caitlyn Perez  Clinic Number: [id number]    Therapy Diagnosis:   Encounter Diagnoses   Name Primary?   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 Caitlyn is extremely upset that we will be having in-person therapy with her on account of the Covid-19 pandemic. She states that she doesn't want anyone to check in with her on the phone and "today will be the last day." She requested to speak with the manager at the end of the session and that was facilitated.    Response to previous treatment: no concerns from tx   Functional change: no changes noted by pt.       Pain: 0/10  Location:  N/a: no c/o pain today.     Objective     Caitlyn received the following manual therapy techniques for 5 minutes:   -Gentle mobilization of joints involving L UE phalanges, metacarpals, carpals, radius and ulna.    Caitlyn received therapeutic exercises for 30 minutes including:    -Gentle stretching of the soft tissues of the L wrist and hand to decrease edema and improve PROM, potentially allowing for increases in active movement.   -Placed flat paddle on pt's hand for duration of treatment to inhibit flexor tone and provide LLPS to long finger flexors.  -Added 70* docking station to inhibit over-recruitment of flexors and provide LLPS to wrist and long finger flexors.   -UBE x10 min forward/ 10 " min back w/ shaun UE, level 1.0, using paddle attachment to allow for active engagement of the L UE. She was encouraged to depress L scap, as well as retract during each rotation at point of shoulder extension to improve scapulo-humeral rhythm.  -demo'd how to stretch to external rotation at table - she completed 4x30s w/ min cues for best positioning and noted that it felt like a more effective stretch than the one supine w/ cane.    Caitlyn participated in dynamic functional therapeutic activities to improve functional performance for 0 minutes, including:  -n/a     Caitlyn participated in neuromuscular re-education activities for 25 minutes to improve: Balance, Coordination, Kinesthetic Sense    -AROM a/p pelvic tilt, R/L lateral pelvic tilt. Hands at sides in position of support to facilitate scapulo-pelvic rhythm and Caitlyn actively pushing through the L UE throughout task. She was encouraged to over-emphasize movements in the L hip to increase accuracy of movements.  -Seated for use of the L UE as support during small-medium movements superimposed on a relatively stable arm. Advanced to standing w/ hand on counter and then w/ hand on incline w/ small-large movements including forward reach, side-reach, side-stepping and reaching.  -Sit<>stand 3x5 w/ L UE positioned on stool to increase ability to use the L UE for support. She positioned the R hand on the L knee for a mild L weight shift, increasing the amount of work required by the left side of the body.    Home Exercises and Education Provided     Education provided:   - Progress towards goals   - Extensive ed re: reasoning for stopping in-person therapy at this point due to Covid-19 pandemic, but will re-start as soon as the threat of pandemic is over. OT offered telephone consultation and telehealth options, but pt expressed that she was not interested. As noted above, she spoke with clinic manager regarding the same.   -Updated HEP.    Written Home Exercises  Provided: yes.  Exercises were reviewed and Caitlyn was able to demonstrate them prior to the end of the session.  Caitlyn demonstrated good  understanding of the HEP provided.   .   See EMR under Patient Instructions for exercises provided 3/23/2020.        Assessment     Caitlyn appeared very upset by the fact that we are holding in-person therapy at this time due to the Covid-19 pandemic. However, she did demo ability to complete HEP and took her handouts with her. Function appears relatively unchanged compared to the past couple of weeks. At this point, we will hold skilled therapy and attempt to resume when the threat of the pandemic has passed.     Caitlyn is progressing slowly towards her goals and there are no updates to goals at this time. Pt prognosis is Fair.    Pt will continue to benefit from skilled outpatient occupational therapy to address the deficits listed in the problem list on initial evaluation provide pt/family education and to maximize pt's level of independence in the home and community environment.     Anticipated barriers to occupational therapy: possible transportation concerns, 10 years since initial onset of symptoms    Pt's spiritual, cultural and educational needs considered and pt agreeable to plan of care and goals.    Goals:  Short Term Goals: 4 weeks   1) Caitlyn will be mod I with initial HEP. (progressing 3/23/2020)  2) Caitlyn will demo improved PROM ER of L shoulder by at least 10* at each of 3 positions supine. (progressing 3/23/2020)  3) Caitlyn will demo elbow extension -30 or better during L UE reaching. (progressing 3/23/2020)     Long Term Goals: 12 weeks   1) Caitlyn will be able to make her bed with shaun UE and modified techniques as necessary. (progressing 3/23/2020)  2) Caitlyn will be able to sweep her floor with modified techniques as necessary. (progressing 3/23/2020)  3) Caitlyn will demo improved L UE function, as evidenced by a FTHUE score of 6/7. (progressing  3/23/2020)  4) Caitlyn will be mod I with d/c HEP. (progressing 3/23/2020)  5) Caitlyn will demo elbow extension -15 or better AROM during reaching. (progressing 3/23/2020)  6) Caitlyn will demo PROM ER of L shoulder of at least 45* at each of 3 positions supine for increased fxl use of the L shoulder. (progressing 3/23/2020)    Plan     Hold skilled OT at this time due to Covid-19 pandemic.   Myranda Valencia, OT

## 2020-03-30 ENCOUNTER — TELEPHONE (OUTPATIENT)
Dept: REHABILITATION | Facility: HOSPITAL | Age: 55
End: 2020-03-30

## 2020-03-30 NOTE — TELEPHONE ENCOUNTER
"Pt returned call at 5:55 pm on 3/30/2020.  PTA inquired if pt had received HEP and if she had been able to perf any exercises and had questions.  Pt interrupted PTA and responded "NO" and hung up on therapist.  "

## 2020-06-01 DIAGNOSIS — R26.9 ABNORMALITY OF GAIT: ICD-10-CM

## 2020-06-01 DIAGNOSIS — G62.9 PERIPHERAL POLYNEUROPATHY: ICD-10-CM

## 2020-06-01 DIAGNOSIS — I69.30 HISTORY OF CVA WITH RESIDUAL DEFICIT: ICD-10-CM

## 2020-06-12 RX ORDER — LIDOCAINE 50 MG/G
1 PATCH TOPICAL DAILY PRN
Qty: 30 PATCH | Refills: 1 | OUTPATIENT
Start: 2020-06-12

## 2020-06-16 DIAGNOSIS — R26.9 ABNORMALITY OF GAIT: ICD-10-CM

## 2020-06-16 DIAGNOSIS — G62.9 PERIPHERAL POLYNEUROPATHY: ICD-10-CM

## 2020-06-16 DIAGNOSIS — I69.30 HISTORY OF CVA WITH RESIDUAL DEFICIT: ICD-10-CM

## 2020-06-16 DIAGNOSIS — I10 HYPERTENSION, UNSPECIFIED TYPE: Primary | ICD-10-CM

## 2020-06-16 RX ORDER — LIDOCAINE 50 MG/G
1 PATCH TOPICAL DAILY PRN
Qty: 30 PATCH | Refills: 1 | Status: SHIPPED | OUTPATIENT
Start: 2020-06-16 | End: 2020-08-14 | Stop reason: SDUPTHER

## 2020-06-16 RX ORDER — LIDOCAINE 50 MG/G
1 PATCH TOPICAL DAILY PRN
Qty: 30 PATCH | Refills: 1 | Status: SHIPPED | OUTPATIENT
Start: 2020-06-16 | End: 2020-07-15

## 2020-06-16 RX ORDER — IRBESARTAN 150 MG/1
150 TABLET ORAL NIGHTLY
Qty: 90 TABLET | Refills: 3 | Status: SHIPPED | OUTPATIENT
Start: 2020-06-16 | End: 2020-07-15 | Stop reason: SDUPTHER

## 2020-06-16 RX ORDER — IRBESARTAN 150 MG/1
TABLET ORAL
Qty: 120 TABLET | Refills: 0 | Status: SHIPPED | OUTPATIENT
Start: 2020-06-16 | End: 2020-07-15

## 2020-06-16 RX ORDER — IRBESARTAN 150 MG/1
TABLET ORAL
COMMUNITY
Start: 2020-06-16 | End: 2020-07-15

## 2020-06-16 RX ORDER — LIDOCAINE 50 MG/G
PATCH TOPICAL
COMMUNITY
Start: 2020-03-26 | End: 2020-07-15

## 2020-06-16 NOTE — TELEPHONE ENCOUNTER
----- Message from Thelma Bach sent at 6/16/2020 10:28 AM CDT -----  Contact: Self  [phone]  Type: RX Refill Request    Who Called: Self    Have you contacted your pharmacy:    Refill or New Rx: refill    RX Name and Strength: irbesartan (AVAPRO) 150 MG tablet & lidocaine (LIDODERM) 5 %    Preferred Pharmacy with phone number:   [facility]  [address] [address]  Phone: [phone] Fax: [phone]    Local or Mail Order: Local    Would the patient rather a call back or a response via [facility]? Call back    Best Call Back Number: [phone]

## 2020-06-16 NOTE — TELEPHONE ENCOUNTER
Pharmacist @ [facility] says Rx for Avapro closed on 1/20/20. Pt had refills prior, but  for some reason unknown her Rx was cancelled. Pt was last seen 1/24/20

## 2020-06-16 NOTE — TELEPHONE ENCOUNTER
----- Message from Thelma Bach sent at 6/16/2020 10:28 AM CDT -----  Contact: Self  [phone]  Type: RX Refill Request    Who Called: Self    Have you contacted your pharmacy:    Refill or New Rx: refill    RX Name and Strength: irbesartan (AVAPRO) 150 MG tablet & lidocaine (LIDODERM) 5 %    Preferred Pharmacy with phone number:   [facility]  [address] [address]  Phone: [phone] Fax: [phone]    Local or Mail Order: Local    Would the patient rather a call back or a response via [facility]? Call back    Best Call Back Number: [phone]

## 2020-06-17 ENCOUNTER — PATIENT OUTREACH (OUTPATIENT)
Dept: ADMINISTRATIVE | Facility: OTHER | Age: 55
End: 2020-06-17

## 2020-06-23 ENCOUNTER — NURSE TRIAGE (OUTPATIENT)
Dept: ADMINISTRATIVE | Facility: CLINIC | Age: 55
End: 2020-06-23
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
## 2020-06-23 NOTE — TELEPHONE ENCOUNTER
Order signed for shanti grullon   - please also offer pt appt this week with another provider with availability
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
## 2020-07-15 ENCOUNTER — HOSPITAL ENCOUNTER (OUTPATIENT)
Dept: RADIOLOGY | Facility: OTHER | Age: 55
Discharge: HOME | End: 2020-07-15
Attending: INTERNAL MEDICINE
Payer: MEDICARE

## 2020-07-15 ENCOUNTER — TELEPHONE (OUTPATIENT)
Dept: INTERNAL MEDICINE | Facility: CLINIC | Age: 55
End: 2020-07-15

## 2020-07-15 ENCOUNTER — OFFICE VISIT (OUTPATIENT)
Dept: INTERNAL MEDICINE | Facility: CLINIC | Age: 55
End: 2020-07-15
Attending: INTERNAL MEDICINE
Payer: MEDICARE

## 2020-07-15 VITALS
OXYGEN SATURATION: 100 % | WEIGHT: 208.31 LBS | SYSTOLIC BLOOD PRESSURE: 130 MMHG | DIASTOLIC BLOOD PRESSURE: 90 MMHG | HEART RATE: 81 BPM | BODY MASS INDEX: 35.56 KG/M2 | HEIGHT: 64 IN

## 2020-07-15 DIAGNOSIS — E21.3 HYPERPARATHYROIDISM: Primary | ICD-10-CM

## 2020-07-15 DIAGNOSIS — M25.511 ACUTE PAIN OF RIGHT SHOULDER: ICD-10-CM

## 2020-07-15 DIAGNOSIS — H60.92 OTITIS EXTERNA OF LEFT EAR, UNSPECIFIED CHRONICITY, UNSPECIFIED TYPE: ICD-10-CM

## 2020-07-15 DIAGNOSIS — R29.6 FREQUENT FALLS: Primary | ICD-10-CM

## 2020-07-15 DIAGNOSIS — I69.398 LACK OF COORDINATION AS LATE EFFECT OF CEREBROVASCULAR ACCIDENT (CVA): ICD-10-CM

## 2020-07-15 DIAGNOSIS — R27.9 LACK OF COORDINATION AS LATE EFFECT OF CEREBROVASCULAR ACCIDENT (CVA): ICD-10-CM

## 2020-07-15 DIAGNOSIS — R26.9 GAIT ABNORMALITY: ICD-10-CM

## 2020-07-15 DIAGNOSIS — I69.30 HISTORY OF CVA WITH RESIDUAL DEFICIT: ICD-10-CM

## 2020-07-15 DIAGNOSIS — Z74.09 IMPAIRED FUNCTIONAL MOBILITY, BALANCE, GAIT, AND ENDURANCE: ICD-10-CM

## 2020-07-15 DIAGNOSIS — E66.01 SEVERE OBESITY (BMI 35.0-39.9) WITH COMORBIDITY: ICD-10-CM

## 2020-07-15 DIAGNOSIS — I10 HYPERTENSION, UNSPECIFIED TYPE: ICD-10-CM

## 2020-07-15 DIAGNOSIS — E21.3 HYPERPARATHYROIDISM: ICD-10-CM

## 2020-07-15 DIAGNOSIS — I10 ESSENTIAL HYPERTENSION: ICD-10-CM

## 2020-07-15 DIAGNOSIS — G81.10 SPASTIC HEMIPARESIS: ICD-10-CM

## 2020-07-15 DIAGNOSIS — R73.02 IGT (IMPAIRED GLUCOSE TOLERANCE): ICD-10-CM

## 2020-07-15 PROCEDURE — 3080F PR MOST RECENT DIASTOLIC BLOOD PRESSURE >= 90 MM HG: ICD-10-PCS | Mod: CPTII,S$GLB,, | Performed by: INTERNAL MEDICINE

## 2020-07-15 PROCEDURE — 99499 UNLISTED E&M SERVICE: CPT | Mod: S$GLB,,, | Performed by: INTERNAL MEDICINE

## 2020-07-15 PROCEDURE — 73030 X-RAY EXAM OF SHOULDER: CPT | Mod: TC,FY,RT

## 2020-07-15 PROCEDURE — 3075F SYST BP GE 130 - 139MM HG: CPT | Mod: CPTII,S$GLB,, | Performed by: INTERNAL MEDICINE

## 2020-07-15 PROCEDURE — 3075F PR MOST RECENT SYSTOLIC BLOOD PRESS GE 130-139MM HG: ICD-10-PCS | Mod: CPTII,S$GLB,, | Performed by: INTERNAL MEDICINE

## 2020-07-15 PROCEDURE — 73030 X-RAY EXAM OF SHOULDER: CPT | Mod: 26,RT,, | Performed by: RADIOLOGY

## 2020-07-15 PROCEDURE — 99214 PR OFFICE/OUTPT VISIT, EST, LEVL IV, 30-39 MIN: ICD-10-PCS | Mod: S$GLB,,, | Performed by: INTERNAL MEDICINE

## 2020-07-15 PROCEDURE — 3008F BODY MASS INDEX DOCD: CPT | Mod: CPTII,S$GLB,, | Performed by: INTERNAL MEDICINE

## 2020-07-15 PROCEDURE — 73030 XR SHOULDER TRAUMA 3 VIEW RIGHT: ICD-10-PCS | Mod: 26,RT,, | Performed by: RADIOLOGY

## 2020-07-15 PROCEDURE — 99999 PR PBB SHADOW E&M-EST. PATIENT-LVL IV: ICD-10-PCS | Mod: PBBFAC,,, | Performed by: INTERNAL MEDICINE

## 2020-07-15 PROCEDURE — 99999 PR PBB SHADOW E&M-EST. PATIENT-LVL IV: CPT | Mod: PBBFAC,,, | Performed by: INTERNAL MEDICINE

## 2020-07-15 PROCEDURE — 3080F DIAST BP >= 90 MM HG: CPT | Mod: CPTII,S$GLB,, | Performed by: INTERNAL MEDICINE

## 2020-07-15 PROCEDURE — 99214 OFFICE O/P EST MOD 30 MIN: CPT | Mod: S$GLB,,, | Performed by: INTERNAL MEDICINE

## 2020-07-15 PROCEDURE — 99499 RISK ADDL DX/OHS AUDIT: ICD-10-PCS | Mod: S$GLB,,, | Performed by: INTERNAL MEDICINE

## 2020-07-15 PROCEDURE — 3008F PR BODY MASS INDEX (BMI) DOCUMENTED: ICD-10-PCS | Mod: CPTII,S$GLB,, | Performed by: INTERNAL MEDICINE

## 2020-07-15 RX ORDER — IRBESARTAN 150 MG/1
150 TABLET ORAL NIGHTLY
Qty: 90 TABLET | Refills: 3 | Status: SHIPPED | OUTPATIENT
Start: 2020-07-15 | End: 2021-03-11 | Stop reason: SDUPTHER

## 2020-07-15 RX ORDER — AMLODIPINE BESYLATE 10 MG/1
TABLET ORAL
Qty: 90 TABLET | Refills: 3 | Status: SHIPPED | OUTPATIENT
Start: 2020-07-15 | End: 2021-03-11 | Stop reason: SDUPTHER

## 2020-07-15 RX ORDER — CIPROFLOXACIN AND DEXAMETHASONE 3; 1 MG/ML; MG/ML
4 SUSPENSION/ DROPS AURICULAR (OTIC) 2 TIMES DAILY
Qty: 7.5 ML | Refills: 0 | Status: SHIPPED | OUTPATIENT
Start: 2020-07-15 | End: 2020-07-24

## 2020-07-15 NOTE — PROGRESS NOTES
"Subjective:   Patient ID: Caitlyn Perez is a 55 y.o. female  Chief complaint:   Chief Complaint   Patient presents with    Otalgia     right    Shoulder Pain     right side; had a fall       HPI  Pt here for HTN f/u     HTN:  To get bp cuff at beginning of month - not checking currently   Out of bp meds x 2 days as misplaced rx   - tolerating meds on med list   - discussed dig htn prog and she agrees to set up at end of month     Never received rollator - reports was told that needed rx from office     Normal bone density in 2018    Hyperparathyroidism:   appt with endo canceled and pt nto set up for VV - is getting new phone set up today - will reschedule     Shoulder pain right side started after fall when getting off of stationary bike - fell on right side on hard wood floor   - no head trauma or loc   - reports when walking back rom bathroom when reaching for rail and missed it - no head trauma or loc   -reports needs rollator to reduce need to use stairs to go to BR updstaies    Still having shoulder pain on right   Worse with lifting   Pain at right ant shoulder   Improved overall since onset     Right ear pain   Started 1 week ago  Hurts all of the time   Put peroxide in right ear     Small amot of drainage - water   No fevers, cough or runny, sore throat, sinus congestion or post nasal drip  occ popping in ear   - not using flonase     Review of Systems       Objective:  Vitals:    07/15/20 1236   BP: (!) 130/90   BP Location: Right arm   Patient Position: Sitting   Pulse: 81   SpO2: 100%   Weight: 94.5 kg (208 lb 5.4 oz)   Height: 5' 4" (1.626 m)     Body mass index is 35.76 kg/m².    Physical Exam  Vitals signs reviewed.   Constitutional:       Appearance: Normal appearance. She is well-developed.   HENT:      Head: Normocephalic and atraumatic.      Comments: Erythema at left canal   No effusion or bulging TM     Right Ear: Tympanic membrane, ear canal and external ear normal.      Left Ear: Tympanic " membrane and external ear normal.      Nose: Nose normal. No congestion.      Mouth/Throat:      Mouth: Mucous membranes are moist.      Pharynx: Oropharynx is clear. No oropharyngeal exudate.   Eyes:      Extraocular Movements: Extraocular movements intact.      Conjunctiva/sclera: Conjunctivae normal.   Neck:      Musculoskeletal: Neck supple.      Thyroid: No thyromegaly.   Cardiovascular:      Rate and Rhythm: Normal rate and regular rhythm.      Pulses: Normal pulses.      Heart sounds: Normal heart sounds.   Pulmonary:      Effort: Pulmonary effort is normal.      Breath sounds: Normal breath sounds.   Abdominal:      General: Bowel sounds are normal.      Palpations: Abdomen is soft.   Musculoskeletal:         General: No tenderness.      Comments: Left UE and LE weakness   Ambulates with walker    Able to adduct and abduct right UE  Mild ttp at ant aspect of right AC  No redness or inc warmth  Neg empty can test   Lymphadenopathy:      Cervical: No cervical adenopathy.   Skin:     General: Skin is warm and dry.      Capillary Refill: Capillary refill takes less than 2 seconds.   Neurological:      Mental Status: She is alert and oriented to person, place, and time. Mental status is at baseline.   Psychiatric:         Behavior: Behavior normal.         Thought Content: Thought content normal.         Assessment:  1. Frequent falls    2. Hypertension, unspecified type    3. Acute pain of right shoulder    4. Essential hypertension    5. History of CVA with residual deficit    6. Spastic hemiparesis    7. Hyperparathyroidism    8. IGT (impaired glucose tolerance)    9. Severe obesity (BMI 35.0-39.9) with comorbidity    10. Gait abnormality    11. Impaired functional mobility, balance, gait, and endurance    12. 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 Otitis externa of left ear, unspecified chronicity, unspecified type        Plan:  Caitlyn was seen today for otalgia and  shoulder pain.    Diagnoses and all orders for this visit:    Frequent falls  -     WALKER FOR HOME USE  -     COMMODE FOR HOME USE    Hypertension, unspecified type  -     amLODIPine (NORVASC) 10 MG tablet; TAKE 1 TABLET(10 MG) BY MOUTH EVERY DAY  -     irbesartan (AVAPRO) 150 MG tablet; Take 1 tablet (150 mg total) by mouth every evening.  -     Hypertension Digital Medicine (HDMP) Enrollment Order  - out of meds x 2 days - above goal   - resume meds and start home monitoring   rtc in 2 weeks for nv for bp check   Low salt diet     Acute pain of right shoulder  -     X-Ray Shoulder Trauma 3 view Right; Future    Essential hypertension    History of CVA with residual deficit    Spastic hemiparesis    Hyperparathyroidism    IGT (impaired glucose tolerance)    Severe obesity (BMI 35.0-39.9) with comorbidity  - cont diet and exercise  - increase intensity and duration of CV exercise to continue weight loss  - goal wt loss one pound per week  - portion control, healthy choices      Gait abnormality    Impaired functional mobility, balance, gait, and endurance    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 for 7 days    Cont meds   rollator order signed and to be faxed to [facility] - fall precautions   Declines PT   Check imaging and f/u with ortho   F/u with specialists     Health Maintenance   Topic Date Due    High Dose Statin  07/12/1985    Lipid Panel  01/21/2021    Mammogram  02/10/2021    TETANUS VACCINE  12/27/2026    Hepatitis C Screening  Completed    Pneumococcal Vaccine (Medium Risk)  Completed

## 2020-07-16 ENCOUNTER — TELEPHONE (OUTPATIENT)
Dept: INTERNAL MEDICINE | Facility: CLINIC | Age: 55
End: 2020-07-16

## 2020-07-16 DIAGNOSIS — M25.519 SHOULDER PAIN, UNSPECIFIED CHRONICITY, UNSPECIFIED LATERALITY: Primary | ICD-10-CM

## 2020-07-16 NOTE — TELEPHONE ENCOUNTER
Attempted to contact patient to inform no labs needed at this time. Unable to leave voice message. Message sent via portal.

## 2020-07-16 NOTE — TELEPHONE ENCOUNTER
Faxed medical necessity form, clinical notes and prescription for commode and walker to [facility] at 7:07AM. Forms placed in fax folder.

## 2020-08-06 ENCOUNTER — DOCUMENTATION ONLY (OUTPATIENT)
Dept: REHABILITATION | Facility: HOSPITAL | Age: 55
End: 2020-08-06

## 2020-08-06 DIAGNOSIS — R68.89 IMPAIRED FUNCTION OF UPPER EXTREMITY: Primary | ICD-10-CM

## 2020-08-06 DIAGNOSIS — I69.398 LACK OF COORDINATION AS LATE EFFECT OF CEREBROVASCULAR ACCIDENT (CVA): ICD-10-CM

## 2020-08-06 DIAGNOSIS — R27.9 LACK OF COORDINATION AS LATE EFFECT OF CEREBROVASCULAR ACCIDENT (CVA): ICD-10-CM

## 2020-08-06 NOTE — DISCHARGE SUMMARY
Outpatient Therapy Discharge Summary     Name: Caitlyn Perez  Clinic Number: [id number]    Therapy Diagnosis:   Encounter Diagnoses   Name Primary?   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 (NOT MET - pt inconsistent)   2) Caitlyn will demo improved PROM ER of L shoulder by at least 10* at each of 3 positions supine. (NOT MET)   3) Caitlyn will demo elbow extension -30 or better during L UE reaching. (progressing NOT MET)      Long Term Goals: 12 weeks   1) Caitlyn will be able to make her bed with shaun UE and modified techniques as necessary. (NOT MET)   2) Caitlyn will be able to sweep her floor with modified techniques as necessary. (NOT MET)   3) Caitlyn will demo improved L UE function, as evidenced by a FTHUE score of 6/7. (NOT MET)   4) Caitlyn will be mod I with d/c HEP. (NOT MET)   5) Caitlyn will demo elbow extension -15 or better AROM during reaching. (NOT MET)   6) Caitlyn will demo PROM ER of L shoulder of at least 45* at each of 3 positions supine for increased fxl use of the L shoulder. (NOT MET)     Discharge reason: Due to organizational recommendations, in-person occupational therapy was stopped at the start of the Covid-19 pandemic as pt was felt to be at higher risk of complications if she were to contract the virus. She refused both telephone consultation and tele-health therapy visits. Will d/c skilled OT.     Plan   This patient is discharged from Occupational Therapy    MARGARITO Wood, DELGADO  8/6/2020

## 2020-08-14 DIAGNOSIS — R26.9 ABNORMALITY OF GAIT: ICD-10-CM

## 2020-08-14 DIAGNOSIS — G62.9 PERIPHERAL POLYNEUROPATHY: ICD-10-CM

## 2020-08-14 DIAGNOSIS — I69.30 HISTORY OF CVA WITH RESIDUAL DEFICIT: ICD-10-CM

## 2020-08-14 NOTE — TELEPHONE ENCOUNTER
----- Message from Evelyne Rucker sent at 8/14/2020  2:35 PM CDT -----  Regarding: Rx Refil  Pt is requesting rx refill for lidocaine patches. Please call patient and advise thanks

## 2020-08-15 RX ORDER — LIDOCAINE 50 MG/G
1 PATCH TOPICAL DAILY PRN
Qty: 30 PATCH | Refills: 1 | Status: SHIPPED | OUTPATIENT
Start: 2020-08-15 | End: 2021-05-07 | Stop reason: SDUPTHER

## 2020-08-15 NOTE — PROGRESS NOTES
Refill Routing Note   Medication(s) are not appropriate for processing by [facility]:       - Outside of protocol           Medication reconciliation completed: No      Automatic Epic Generated Protocol Data:        Requested Prescriptions   Pending Prescriptions Disp Refills    lidocaine (LIDODERM) 5 % 30 patch 1     Sig: Place 1 patch onto the skin daily as needed. Remove & Discard patch within 12 hours or as directed by MD       There is no refill protocol information for this order           Appointments  past 12m or future 3m with PCP    Date Provider   Last Visit   7/15/2020 Lizeth Isaacs MD   Next Visit   10/16/2020 Lizeth Isaacs MD   ED visits in past 90 days: 0     Note composed:7:05 PM 08/14/2020

## 2020-08-30 NOTE — TELEPHONE ENCOUNTER
Attempted to contact patient to schedule labs. Voicemail is in Lao. Attempted to contact daughter but number was incorrect.    aware

## 2021-03-11 DIAGNOSIS — I10 HYPERTENSION, UNSPECIFIED TYPE: ICD-10-CM

## 2021-03-11 DIAGNOSIS — R73.02 IGT (IMPAIRED GLUCOSE TOLERANCE): Primary | ICD-10-CM

## 2021-03-11 RX ORDER — IRBESARTAN 150 MG/1
150 TABLET ORAL NIGHTLY
Qty: 90 TABLET | Refills: 0 | Status: SHIPPED | OUTPATIENT
Start: 2021-03-11 | End: 2021-05-12

## 2021-03-11 RX ORDER — AMLODIPINE BESYLATE 10 MG/1
TABLET ORAL
Qty: 90 TABLET | Refills: 1 | Status: SHIPPED | OUTPATIENT
Start: 2021-03-11

## 2021-05-07 DIAGNOSIS — I69.30 HISTORY OF CVA WITH RESIDUAL DEFICIT: ICD-10-CM

## 2021-05-07 DIAGNOSIS — G62.9 PERIPHERAL POLYNEUROPATHY: ICD-10-CM

## 2021-05-07 DIAGNOSIS — R26.9 ABNORMALITY OF GAIT: ICD-10-CM

## 2021-05-10 DIAGNOSIS — I10 HYPERTENSION, UNSPECIFIED TYPE: ICD-10-CM

## 2021-05-10 RX ORDER — LIDOCAINE 50 MG/G
1 PATCH TOPICAL DAILY PRN
Qty: 30 PATCH | Refills: 1 | Status: SHIPPED | OUTPATIENT
Start: 2021-05-10

## 2021-05-13 RX ORDER — IRBESARTAN 150 MG/1
150 TABLET ORAL DAILY
Qty: 90 TABLET | Refills: 0 | Status: SHIPPED | OUTPATIENT
Start: 2021-05-13

## 2021-05-14 ENCOUNTER — TELEPHONE (OUTPATIENT)
Dept: INTERNAL MEDICINE | Facility: CLINIC | Age: 56
End: 2021-05-14

## 2021-05-17 RX ORDER — ISOPROPYL ALCOHOL 70 ML/100ML
1 SWAB TOPICAL
Qty: 100 EACH | Refills: 3 | Status: SHIPPED | OUTPATIENT
Start: 2021-05-17

## 2021-05-17 RX ORDER — CALCIUM CITRATE/VITAMIN D3 200MG-6.25
1 TABLET ORAL
Qty: 100 EACH | Refills: 3 | Status: SHIPPED | OUTPATIENT
Start: 2021-05-17

## 2021-05-17 RX ORDER — BLOOD-GLUCOSE METER
1 EACH MISCELLANEOUS DAILY
Qty: 3 EACH | Refills: 3 | OUTPATIENT
Start: 2021-05-17

## 2021-05-17 RX ORDER — INSULIN PUMP SYRINGE, 3 ML
EACH MISCELLANEOUS
Qty: 1 EACH | Refills: 0 | Status: SHIPPED | OUTPATIENT
Start: 2021-05-17

## 2021-05-17 RX ORDER — LANCETS 33 GAUGE
EACH MISCELLANEOUS
Qty: 100 EACH | Refills: 3 | Status: SHIPPED | OUTPATIENT
Start: 2021-05-17

## 2021-05-19 ENCOUNTER — PATIENT OUTREACH (OUTPATIENT)
Dept: ADMINISTRATIVE | Facility: HOSPITAL | Age: 56
End: 2021-05-19

## 2021-05-19 ENCOUNTER — PATIENT MESSAGE (OUTPATIENT)
Dept: ADMINISTRATIVE | Facility: HOSPITAL | Age: 56
End: 2021-05-19

## 2021-06-02 NOTE — ANESTHESIA POSTPROCEDURE EVALUATION
"Anesthesia Post Evaluation    Patient: Caitlyn Perez    Procedure(s) Performed: Procedure(s) (LRB):  COLONOSCOPY (N/A)    Final Anesthesia Type: general  Patient location during evaluation: PACU  Patient participation: Yes- Able to Participate  Level of consciousness: awake and alert  Post-procedure vital signs: reviewed and stable  Pain management: adequate  Airway patency: patent  PONV status at discharge: No PONV  Anesthetic complications: no      Cardiovascular status: blood pressure returned to baseline and stable  Respiratory status: unassisted  Hydration status: euvolemic  Follow-up not needed.        Visit Vitals    /76    Pulse 76    Temp 36.7 °C (98 °F)    Resp 18    Ht 5' 4" (1.626 m)    Wt 90.7 kg (200 lb)    SpO2 100%    Breastfeeding No    BMI 34.33 kg/m2       Pain/Sal Score: Pain Assessment Performed: Yes (1/25/2017 12:10 PM)  Sal Score: 10 (1/25/2017 11:41 AM)      "
Yes

## 2021-06-05 NOTE — TELEPHONE ENCOUNTER
Called pt and left vm stating that PCP rec she see sleep regarding sleep apnea.  Left office number for sleep dept for pt to call and schedule an appt   .     VSS, afebrile. Breathing well on room air. Appropriate movement and behavior for age. Hemodynamically stable. Eating puffs, banana and milk well. No BBM. Voiding well. Hourly rounding completed. Transferred to unit 6 at 1140.

## 2021-08-18 NOTE — TELEPHONE ENCOUNTER
----- Message from Rik Leon sent at 4/20/2017 10:40 AM CDT -----  Contact: Pt  X_ 1st Request  _ 2nd Request  _ 3rd Request    Who:ANDRES SHORT [[id number]]    Why: Patient would like to speak with staff in regards to a possible D&C    What Number to Call Back: [phone]    When to Expect a call back: (Before the end of the day)  -- if call after 3:00 call back will be tomorrow.  
Returned call to schedule an appointment to discuss her request. Patient seem confused and states some one told her she needed a D&C 4 years ago. She, then states i have an appointment on 04/21/2017 for a vaginal discharge. Confirmed appointment with patient.  
22-Aug-2021

## 2021-08-25 ENCOUNTER — OFFICE VISIT (OUTPATIENT)
Dept: OBSTETRICS AND GYNECOLOGY | Facility: CLINIC | Age: 56
End: 2021-08-25
Payer: MEDICARE

## 2021-08-25 VITALS
HEIGHT: 64 IN | DIASTOLIC BLOOD PRESSURE: 93 MMHG | WEIGHT: 212.5 LBS | HEART RATE: 78 BPM | BODY MASS INDEX: 36.28 KG/M2 | SYSTOLIC BLOOD PRESSURE: 163 MMHG

## 2021-08-25 DIAGNOSIS — Z01.419 WOMEN'S ANNUAL ROUTINE GYNECOLOGICAL EXAMINATION: ICD-10-CM

## 2021-08-25 DIAGNOSIS — Z87.42 HISTORY OF ABNORMAL CERVICAL PAP SMEAR: ICD-10-CM

## 2021-08-25 DIAGNOSIS — Z12.4 CERVICAL CANCER SCREENING: Primary | ICD-10-CM

## 2021-08-25 PROCEDURE — 3077F SYST BP >= 140 MM HG: CPT | Mod: CPTII,S$GLB,, | Performed by: OBSTETRICS & GYNECOLOGY

## 2021-08-25 PROCEDURE — G0101 PR CA SCREEN;PELVIC/BREAST EXAM: ICD-10-PCS | Mod: S$GLB,,, | Performed by: OBSTETRICS & GYNECOLOGY

## 2021-08-25 PROCEDURE — 88142 CYTOPATH C/V THIN LAYER: CPT | Performed by: OBSTETRICS & GYNECOLOGY

## 2021-08-25 PROCEDURE — G0101 CA SCREEN;PELVIC/BREAST EXAM: HCPCS | Mod: S$GLB,,, | Performed by: OBSTETRICS & GYNECOLOGY

## 2021-08-25 PROCEDURE — 3008F BODY MASS INDEX DOCD: CPT | Mod: CPTII,S$GLB,, | Performed by: OBSTETRICS & GYNECOLOGY

## 2021-08-25 PROCEDURE — 3080F DIAST BP >= 90 MM HG: CPT | Mod: CPTII,S$GLB,, | Performed by: OBSTETRICS & GYNECOLOGY

## 2021-08-25 PROCEDURE — 3080F PR MOST RECENT DIASTOLIC BLOOD PRESSURE >= 90 MM HG: ICD-10-PCS | Mod: CPTII,S$GLB,, | Performed by: OBSTETRICS & GYNECOLOGY

## 2021-08-25 PROCEDURE — 1159F MED LIST DOCD IN RCRD: CPT | Mod: CPTII,S$GLB,, | Performed by: OBSTETRICS & GYNECOLOGY

## 2021-08-25 PROCEDURE — 3077F PR MOST RECENT SYSTOLIC BLOOD PRESSURE >= 140 MM HG: ICD-10-PCS | Mod: CPTII,S$GLB,, | Performed by: OBSTETRICS & GYNECOLOGY

## 2021-08-25 PROCEDURE — 3008F PR BODY MASS INDEX (BMI) DOCUMENTED: ICD-10-PCS | Mod: CPTII,S$GLB,, | Performed by: OBSTETRICS & GYNECOLOGY

## 2021-08-25 PROCEDURE — 1159F PR MEDICATION LIST DOCUMENTED IN MEDICAL RECORD: ICD-10-PCS | Mod: CPTII,S$GLB,, | Performed by: OBSTETRICS & GYNECOLOGY

## 2021-09-03 ENCOUNTER — TELEPHONE (OUTPATIENT)
Dept: OBSTETRICS AND GYNECOLOGY | Facility: CLINIC | Age: 56
End: 2021-09-03

## 2021-09-14 LAB
FINAL PATHOLOGIC DIAGNOSIS: NORMAL
Lab: NORMAL

## 2021-09-16 ENCOUNTER — TELEPHONE (OUTPATIENT)
Dept: OBSTETRICS AND GYNECOLOGY | Facility: CLINIC | Age: 56
End: 2021-09-16

## 2021-10-05 ENCOUNTER — PATIENT MESSAGE (OUTPATIENT)
Dept: ADMINISTRATIVE | Facility: HOSPITAL | Age: 56
End: 2021-10-05

## 2021-11-16 ENCOUNTER — PES CALL (OUTPATIENT)
Dept: ADMINISTRATIVE | Facility: CLINIC | Age: 56
End: 2021-11-16
Payer: MEDICARE

## 2021-12-15 ENCOUNTER — PES CALL (OUTPATIENT)
Dept: ADMINISTRATIVE | Facility: CLINIC | Age: 56
End: 2021-12-15
Payer: MEDICARE

## 2022-05-05 ENCOUNTER — PES CALL (OUTPATIENT)
Dept: ADMINISTRATIVE | Facility: CLINIC | Age: 57
End: 2022-05-05
Payer: MEDICARE

## 2022-05-30 ENCOUNTER — PATIENT MESSAGE (OUTPATIENT)
Dept: ADMINISTRATIVE | Facility: HOSPITAL | Age: 57
End: 2022-05-30
Payer: MEDICARE

## 2022-06-24 ENCOUNTER — PES CALL (OUTPATIENT)
Dept: ADMINISTRATIVE | Facility: CLINIC | Age: 57
End: 2022-06-24
Payer: MEDICARE

## 2022-07-19 ENCOUNTER — PES CALL (OUTPATIENT)
Dept: ADMINISTRATIVE | Facility: CLINIC | Age: 57
End: 2022-07-19
Payer: MEDICARE

## 2022-08-19 ENCOUNTER — PES CALL (OUTPATIENT)
Dept: ADMINISTRATIVE | Facility: CLINIC | Age: 57
End: 2022-08-19
Payer: MEDICARE

## 2022-10-04 ENCOUNTER — PES CALL (OUTPATIENT)
Dept: ADMINISTRATIVE | Facility: CLINIC | Age: 57
End: 2022-10-04
Payer: MEDICARE

## 2023-01-25 ENCOUNTER — PATIENT OUTREACH (OUTPATIENT)
Dept: ADMINISTRATIVE | Facility: HOSPITAL | Age: 58
End: 2023-01-25
Payer: MEDICARE

## 2023-03-06 ENCOUNTER — OFFICE VISIT (OUTPATIENT)
Dept: OBSTETRICS AND GYNECOLOGY | Facility: CLINIC | Age: 58
End: 2023-03-06
Payer: MEDICARE

## 2023-03-06 VITALS
SYSTOLIC BLOOD PRESSURE: 130 MMHG | DIASTOLIC BLOOD PRESSURE: 86 MMHG | BODY MASS INDEX: 35.96 KG/M2 | WEIGHT: 210.63 LBS | HEIGHT: 64 IN

## 2023-03-06 DIAGNOSIS — R30.0 DYSURIA: ICD-10-CM

## 2023-03-06 DIAGNOSIS — Z01.419 WOMEN'S ANNUAL ROUTINE GYNECOLOGICAL EXAMINATION: Primary | ICD-10-CM

## 2023-03-06 DIAGNOSIS — N89.8 VAGINAL DISCHARGE: ICD-10-CM

## 2023-03-06 LAB
BILIRUBIN, UA POC OHS: NEGATIVE
BLOOD, UA POC OHS: NEGATIVE
CLARITY, UA POC OHS: CLEAR
COLOR, UA POC OHS: ABNORMAL
GLUCOSE, UA POC OHS: NEGATIVE
KETONES, UA POC OHS: NEGATIVE
LEUKOCYTES, UA POC OHS: NEGATIVE
NITRITE, UA POC OHS: NEGATIVE
PH, UA POC OHS: 7.5
PROTEIN, UA POC OHS: NEGATIVE
SPECIFIC GRAVITY, UA POC OHS: >=1.03
UROBILINOGEN, UA POC OHS: 0.2

## 2023-03-06 PROCEDURE — G0101 PR CA SCREEN;PELVIC/BREAST EXAM: ICD-10-PCS | Mod: GZ,S$GLB,, | Performed by: OBSTETRICS & GYNECOLOGY

## 2023-03-06 PROCEDURE — 3079F DIAST BP 80-89 MM HG: CPT | Mod: CPTII,S$GLB,, | Performed by: OBSTETRICS & GYNECOLOGY

## 2023-03-06 PROCEDURE — 1159F PR MEDICATION LIST DOCUMENTED IN MEDICAL RECORD: ICD-10-PCS | Mod: CPTII,S$GLB,, | Performed by: OBSTETRICS & GYNECOLOGY

## 2023-03-06 PROCEDURE — G0101 CA SCREEN;PELVIC/BREAST EXAM: HCPCS | Mod: GZ,S$GLB,, | Performed by: OBSTETRICS & GYNECOLOGY

## 2023-03-06 PROCEDURE — 81003 URINALYSIS AUTO W/O SCOPE: CPT | Mod: QW,S$GLB,, | Performed by: OBSTETRICS & GYNECOLOGY

## 2023-03-06 PROCEDURE — 3008F PR BODY MASS INDEX (BMI) DOCUMENTED: ICD-10-PCS | Mod: CPTII,S$GLB,, | Performed by: OBSTETRICS & GYNECOLOGY

## 2023-03-06 PROCEDURE — 3008F BODY MASS INDEX DOCD: CPT | Mod: CPTII,S$GLB,, | Performed by: OBSTETRICS & GYNECOLOGY

## 2023-03-06 PROCEDURE — 3075F SYST BP GE 130 - 139MM HG: CPT | Mod: CPTII,S$GLB,, | Performed by: OBSTETRICS & GYNECOLOGY

## 2023-03-06 PROCEDURE — 88175 CYTOPATH C/V AUTO FLUID REDO: CPT | Performed by: OBSTETRICS & GYNECOLOGY

## 2023-03-06 PROCEDURE — 81514 NFCT DS BV&VAGINITIS DNA ALG: CPT | Performed by: OBSTETRICS & GYNECOLOGY

## 2023-03-06 PROCEDURE — 3079F PR MOST RECENT DIASTOLIC BLOOD PRESSURE 80-89 MM HG: ICD-10-PCS | Mod: CPTII,S$GLB,, | Performed by: OBSTETRICS & GYNECOLOGY

## 2023-03-06 PROCEDURE — 81003 POCT URINALYSIS(INSTRUMENT): ICD-10-PCS | Mod: QW,S$GLB,, | Performed by: OBSTETRICS & GYNECOLOGY

## 2023-03-06 PROCEDURE — 3075F PR MOST RECENT SYSTOLIC BLOOD PRESS GE 130-139MM HG: ICD-10-PCS | Mod: CPTII,S$GLB,, | Performed by: OBSTETRICS & GYNECOLOGY

## 2023-03-06 PROCEDURE — 1159F MED LIST DOCD IN RCRD: CPT | Mod: CPTII,S$GLB,, | Performed by: OBSTETRICS & GYNECOLOGY

## 2023-03-06 RX ORDER — ROSUVASTATIN CALCIUM 10 MG/1
10 TABLET, COATED ORAL DAILY
COMMUNITY
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
## 2023-03-10 LAB
BACTERIAL VAGINOSIS DNA: NEGATIVE
CANDIDA GLABRATA DNA: NEGATIVE
CANDIDA KRUSEI DNA: NEGATIVE
CANDIDA RRNA VAG QL PROBE: NEGATIVE
T VAGINALIS RRNA GENITAL QL PROBE: NEGATIVE

## 2023-03-14 LAB
FINAL PATHOLOGIC DIAGNOSIS: NORMAL
Lab: NORMAL

## 2023-03-15 ENCOUNTER — TELEPHONE (OUTPATIENT)
Dept: OBSTETRICS AND GYNECOLOGY | Facility: CLINIC | Age: 58
End: 2023-03-15
Payer: MEDICARE

## 2023-03-15 NOTE — TELEPHONE ENCOUNTER
----- Message from Macy Lynch sent at 3/15/2023  9:34 AM CDT -----  Contact: PATient  Type:  Needs Medical Advice    Who Called: Patient       Pharmacy name and phone #:    [facility]  [address] [facility] [address]  [facility] [address]  Phone: [phone] Fax: [phone]    [facility] - [facility] [address] [facility] [address]  [address] [facility] [address]  Phone: [phone] Fax: [phone]      Would the patient rather a call back or a response via [facility]? Call    Best Call Back Number: [phone] (home)     Additional Information: Patient requesting test results

## 2024-08-04 ENCOUNTER — HOSPITAL ENCOUNTER (EMERGENCY)
Facility: HOSPITAL | Age: 59
Discharge: HOME | End: 2024-08-05
Attending: EMERGENCY MEDICINE
Payer: MEDICARE

## 2024-08-04 DIAGNOSIS — M25.532 LEFT WRIST PAIN: ICD-10-CM

## 2024-08-04 DIAGNOSIS — M79.672 LEFT FOOT PAIN: ICD-10-CM

## 2024-08-04 DIAGNOSIS — M25.522 LEFT ELBOW PAIN: ICD-10-CM

## 2024-08-04 DIAGNOSIS — S62.92XA LEFT HAND FRACTURE, CLOSED, INITIAL ENCOUNTER: Primary | ICD-10-CM

## 2024-08-04 LAB
ALBUMIN SERPL BCP-MCNC: 3.5 G/DL (ref 3.5–5.2)
ALP SERPL-CCNC: 166 U/L (ref 55–135)
ALT SERPL W/O P-5'-P-CCNC: 62 U/L (ref 10–44)
ANION GAP SERPL CALC-SCNC: 9 MMOL/L (ref 8–16)
AST SERPL-CCNC: 54 U/L (ref 10–40)
BASOPHILS # BLD AUTO: 0.07 K/UL (ref 0–0.2)
BASOPHILS NFR BLD: 0.8 % (ref 0–1.9)
BILIRUB SERPL-MCNC: 0.3 MG/DL (ref 0.1–1)
BILIRUB UR QL STRIP: NEGATIVE
BUN SERPL-MCNC: 16 MG/DL (ref 6–20)
CALCIUM SERPL-MCNC: 10.5 MG/DL (ref 8.7–10.5)
CHLORIDE SERPL-SCNC: 109 MMOL/L (ref 95–110)
CLARITY UR REFRACT.AUTO: CLEAR
CO2 SERPL-SCNC: 23 MMOL/L (ref 23–29)
COLOR UR AUTO: COLORLESS
CREAT SERPL-MCNC: 1.1 MG/DL (ref 0.5–1.4)
DIFFERENTIAL METHOD BLD: NORMAL
EOSINOPHIL # BLD AUTO: 0.2 K/UL (ref 0–0.5)
EOSINOPHIL NFR BLD: 2.6 % (ref 0–8)
ERYTHROCYTE [DISTWIDTH] IN BLOOD BY AUTOMATED COUNT: 13.2 % (ref 11.5–14.5)
EST. GFR  (NO RACE VARIABLE): 57.5 ML/MIN/1.73 M^2
GLUCOSE SERPL-MCNC: 110 MG/DL (ref 70–110)
GLUCOSE UR QL STRIP: NEGATIVE
HCT VFR BLD AUTO: 37.1 % (ref 37–48.5)
HCV AB SERPL QL IA: NORMAL
HGB BLD-MCNC: 12 G/DL (ref 12–16)
HGB UR QL STRIP: NEGATIVE
HIV 1+2 AB+HIV1 P24 AG SERPL QL IA: NORMAL
IMM GRANULOCYTES # BLD AUTO: 0.01 K/UL (ref 0–0.04)
IMM GRANULOCYTES NFR BLD AUTO: 0.1 % (ref 0–0.5)
INR PPP: 1 (ref 0.8–1.2)
KETONES UR QL STRIP: NEGATIVE
LEUKOCYTE ESTERASE UR QL STRIP: NEGATIVE
LYMPHOCYTES # BLD AUTO: 2.4 K/UL (ref 1–4.8)
LYMPHOCYTES NFR BLD: 28.5 % (ref 18–48)
MCH RBC QN AUTO: 27.6 PG (ref 27–31)
MCHC RBC AUTO-ENTMCNC: 32.3 G/DL (ref 32–36)
MCV RBC AUTO: 85 FL (ref 82–98)
MONOCYTES # BLD AUTO: 0.7 K/UL (ref 0.3–1)
MONOCYTES NFR BLD: 7.8 % (ref 4–15)
NEUTROPHILS # BLD AUTO: 5 K/UL (ref 1.8–7.7)
NEUTROPHILS NFR BLD: 60.2 % (ref 38–73)
NITRITE UR QL STRIP: NEGATIVE
NRBC BLD-RTO: 0 /100 WBC
PH UR STRIP: 8 [PH] (ref 5–8)
PLATELET # BLD AUTO: 322 K/UL (ref 150–450)
PMV BLD AUTO: 11.2 FL (ref 9.2–12.9)
POTASSIUM SERPL-SCNC: 3.9 MMOL/L (ref 3.5–5.1)
PROT SERPL-MCNC: 6.7 G/DL (ref 6–8.4)
PROT UR QL STRIP: ABNORMAL
PROTHROMBIN TIME: 11.4 SEC (ref 9–12.5)
RBC # BLD AUTO: 4.35 M/UL (ref 4–5.4)
SODIUM SERPL-SCNC: 141 MMOL/L (ref 136–145)
SP GR UR STRIP: 1.01 (ref 1–1.03)
URN SPEC COLLECT METH UR: ABNORMAL
WBC # BLD AUTO: 8.31 K/UL (ref 3.9–12.7)

## 2024-08-04 PROCEDURE — 96374 THER/PROPH/DIAG INJ IV PUSH: CPT

## 2024-08-04 PROCEDURE — 63600175 PHARM REV CODE 636 W HCPCS: Mod: UD

## 2024-08-04 PROCEDURE — 86803 HEPATITIS C AB TEST: CPT | Performed by: PHYSICIAN ASSISTANT

## 2024-08-04 PROCEDURE — 80053 COMPREHEN METABOLIC PANEL: CPT

## 2024-08-04 PROCEDURE — 99285 EMERGENCY DEPT VISIT HI MDM: CPT | Mod: 25

## 2024-08-04 PROCEDURE — 80143 DRUG ASSAY ACETAMINOPHEN: CPT

## 2024-08-04 PROCEDURE — 85610 PROTHROMBIN TIME: CPT

## 2024-08-04 PROCEDURE — 81003 URINALYSIS AUTO W/O SCOPE: CPT | Mod: 59

## 2024-08-04 PROCEDURE — 85025 COMPLETE CBC W/AUTO DIFF WBC: CPT

## 2024-08-04 PROCEDURE — 87389 HIV-1 AG W/HIV-1&-2 AB AG IA: CPT | Performed by: PHYSICIAN ASSISTANT

## 2024-08-04 RX ORDER — MORPHINE SULFATE 4 MG/ML
4 INJECTION, SOLUTION INTRAMUSCULAR; INTRAVENOUS
Status: COMPLETED | OUTPATIENT
Start: 2024-08-04 | End: 2024-08-04

## 2024-08-04 RX ADMIN — MORPHINE SULFATE 4 MG: 4 INJECTION INTRAVENOUS at 10:08

## 2024-08-05 VITALS
BODY MASS INDEX: 34.15 KG/M2 | HEIGHT: 64 IN | WEIGHT: 200 LBS | DIASTOLIC BLOOD PRESSURE: 89 MMHG | SYSTOLIC BLOOD PRESSURE: 150 MMHG | TEMPERATURE: 98 F | HEART RATE: 57 BPM | RESPIRATION RATE: 12 BRPM | OXYGEN SATURATION: 99 %

## 2024-08-05 LAB — APAP SERPL-MCNC: <3 UG/ML (ref 10–20)

## 2024-08-05 PROCEDURE — 25000003 PHARM REV CODE 250

## 2024-08-05 RX ORDER — HYDROCODONE BITARTRATE AND ACETAMINOPHEN 7.5; 325 MG/1; MG/1
1 TABLET ORAL EVERY 6 HOURS PRN
Qty: 12 TABLET | Refills: 0 | Status: SHIPPED | OUTPATIENT
Start: 2024-08-05

## 2024-08-05 RX ORDER — HYDROCODONE BITARTRATE AND ACETAMINOPHEN 7.5; 325 MG/1; MG/1
1 TABLET ORAL EVERY 6 HOURS PRN
Qty: 12 TABLET | Refills: 0 | Status: SHIPPED | OUTPATIENT
Start: 2024-08-05 | End: 2024-08-05

## 2024-08-05 RX ORDER — HYDROCODONE BITARTRATE AND ACETAMINOPHEN 5; 325 MG/1; MG/1
1 TABLET ORAL
Status: COMPLETED | OUTPATIENT
Start: 2024-08-05 | End: 2024-08-05

## 2024-08-05 RX ADMIN — HYDROCODONE BITARTRATE AND ACETAMINOPHEN 1 TABLET: 5; 325 TABLET ORAL at 12:08
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
## 2024-08-05 NOTE — CARE UPDATE
"Patient arrived to ED with ortho glass splint in place to L lower arm. Splint held in place with gauze wrap, then whole arm wrapped with coban and then ace wrap. Patient endorsing decreased sensation, pain, and swelling to L hand and arm. Bruising to L hand, palm noted, with swelling in knuckles and fingers of L hand. Fingertips of L hand purple with decreased cap refill.  Ace wrap, coban, glass splint removed with verbal ok from MD by myself and FABIENNE Noonan, charge RN. Abrasion to back of L arm noted underneath splint.  Cap refill, purplish color improved in L fingers with removal of splint and wrap, patient endorsing partial relief of pain and "pressure" to L arm.  See media in pt chart for pictures of fingers, abrasion  "
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
## 2024-08-05 NOTE — ED NOTES
Nurses Note -- 4 Eyes      8/5/2024   12:53 AM      Skin assessed during: Admit      [] No Altered Skin Integrity Present    []Prevention Measures Documented      [x] Yes- Altered Skin Integrity Present or Discovered   [x] LDA Added if Not in Epic (Describe Wound)   [x] New Altered Skin Integrity was Present on Admit and Documented in LDA   [x] Wound Image Taken    Wound Care Consulted? No    Attending Nurse:  Snow Sterling RN/Staff Member:  FABIENNE Noonan RN & SESAR Yeh RN

## 2024-08-05 NOTE — PLAN OF CARE
[facility] - Emergency Dept  Initial Discharge Assessment       Primary Care Provider: Rhonda Cao MD    Admission Diagnosis: No admission diagnoses are documented for this encounter.    Admission Date: 8/4/2024  Expected Discharge Date: 8/5/2024    Sw met pt at bedside, pt  lives alone and ambulates without assistance. Pt stated she is agreeable with home health.     Leatha Valencia LMSW  Case Management  Emergency Department  [phone]         Transition of Care Barriers: (P) None    Payor: [employer]Current MEDICARE / Plan: [employer] MEDICARE HMO / Product Type: Capitation /     Extended Emergency Contact Information  Primary Emergency Contact: Jean Latif   [facility]  Home Phone: [phone]  Relation: Daughter    Discharge Plan A: (P) Home  Discharge Plan B: (P) Home Health      [facility]  [address]  [facility] [address]  Phone: [phone] Fax: [phone]    [facility] - [facility] [address]  [address] [facility] [place] [address]  Phone: [phone] Fax: [phone]      Initial Assessment (most recent)       Adult Discharge Assessment - 08/05/24 0200          Discharge Assessment    Assessment Type Discharge Planning Assessment (P)      Confirmed/corrected address, phone number and insurance Yes (P)      Confirmed Demographics Correct on Facesheet (P)      Source of Information patient (P)      If unable to respond/provide information was family/caregiver contacted? No Contact Information Available (P)      Does patient/caregiver understand observation status Yes (P)      Communicated [place] with patient/caregiver Date not available/Unable to determine (P)      People in Home child(teo), adult (P)      Do you expect to return to your current living situation? Yes (P)      Do you have help at home or someone to help you manage your care at home? No (P)      Prior to hospitilization cognitive status: Alert/Oriented  (P)      Current cognitive status: Alert/Oriented (P)      Walking or Climbing Stairs Difficulty no (P)      Dressing/Bathing Difficulty no (P)      Home Accessibility not wheelchair accessible (P)      Home Layout Able to live on 1st floor (P)      Equipment Currently Used at Home none (P)      Readmission within 30 days? No (P)      Patient currently being followed by outpatient case management? No (P)      Do you currently have service(s) that help you manage your care at home? No (P)      Do you take prescription medications? Yes (P)      Do you have prescription coverage? Yes (P)      Do you have any problems affording any of your prescribed medications? No (P)      Is the patient taking medications as prescribed? yes (P)      Who is going to help you get home at discharge? Jean linn  (P)      How do you get to doctors appointments? family or friend will provide (P)      Are you on dialysis? No (P)      Do you take coumadin? No (P)      Discharge Plan A Home (P)      Discharge Plan B Home Health (P)      DME Needed Upon Discharge  none (P)      Discharge Plan discussed with: Patient;Adult children (P)      Transition of Care Barriers None (P)         Physical Activity    On average, how many days per week do you engage in moderate to strenuous exercise (like a brisk walk)? 7 days (P)      On average, how many minutes do you engage in exercise at this level? 60 min (P)         Financial Resource Strain    How hard is it for you to pay for the very basics like food, housing, medical care, and heating? Not hard at all (P)         Housing Stability    In the last 12 months, was there a time when you were not able to pay the mortgage or rent on time? No (P)      At any time in the past 12 months, were you homeless or living in a shelter (including now)? No (P)         Transportation Needs    Has the lack of transportation kept you from medical appointments, meetings, work or from getting things  needed for daily living? No (P)         Food Insecurity    Within the past 12 months, you worried that your food would run out before you got the money to buy more. Never true (P)      Within the past 12 months, the food you bought just didn't last and you didn't have money to get more. Never true (P)         Stress    Do you feel stress - tense, restless, nervous, or anxious, or unable to sleep at night because your mind is troubled all the time - these days? Not at all (P)         Social Isolation    How often do you feel lonely or isolated from those around you?  Never (P)         Alcohol Use    Q1: How often do you have a drink containing alcohol? Never (P)      Q2: How many drinks containing alcohol do you have on a typical day when you are drinking? Patient does not drink (P)      Q3: How often do you have six or more drinks on one occasion? Never (P)         Utilities    In the past 12 months has the electric, gas, oil, or water company threatened to shut off services in your home? No (P)         Health Literacy    How often do you need to have someone help you when you read instructions, pamphlets, or other written material from your doctor or pharmacy? Never (P)

## 2024-08-09 ENCOUNTER — TELEPHONE (OUTPATIENT)
Dept: GASTROENTEROLOGY | Facility: CLINIC | Age: 59
End: 2024-08-09
Payer: MEDICARE

## 2024-08-09 ENCOUNTER — TELEPHONE (OUTPATIENT)
Dept: ORTHOPEDICS | Facility: CLINIC | Age: 59
End: 2024-08-09
Payer: MEDICARE

## 2024-08-12 ENCOUNTER — TELEPHONE (OUTPATIENT)
Dept: ORTHOPEDICS | Facility: CLINIC | Age: 59
End: 2024-08-12
Payer: MEDICARE

## 2024-09-16 ENCOUNTER — TELEPHONE (OUTPATIENT)
Dept: ENDOSCOPY | Facility: HOSPITAL | Age: 59
End: 2024-09-16
Payer: MEDICARE

## 2024-09-16 NOTE — TELEPHONE ENCOUNTER
Ma spoke with pt , Pt came to office to schedule procedures , Pt is requesting EGD and Colonoscopy   Pt does not have orders or a referral for procedures . Pt states she would like to establish care with a provider   Pt is requesting a call back

## 2025-06-25 ENCOUNTER — OFFICE VISIT (OUTPATIENT)
Dept: OBSTETRICS AND GYNECOLOGY | Facility: CLINIC | Age: 60
End: 2025-06-25
Payer: MEDICARE

## 2025-06-25 ENCOUNTER — RESULTS FOLLOW-UP (OUTPATIENT)
Dept: OBSTETRICS AND GYNECOLOGY | Facility: CLINIC | Age: 60
End: 2025-06-25

## 2025-06-25 ENCOUNTER — HOSPITAL ENCOUNTER (OUTPATIENT)
Dept: RADIOLOGY | Facility: HOSPITAL | Age: 60
Discharge: HOME | End: 2025-06-25
Payer: MEDICARE

## 2025-06-25 VITALS
SYSTOLIC BLOOD PRESSURE: 146 MMHG | HEIGHT: 64 IN | DIASTOLIC BLOOD PRESSURE: 101 MMHG | WEIGHT: 198 LBS | BODY MASS INDEX: 33.8 KG/M2 | HEART RATE: 74 BPM

## 2025-06-25 DIAGNOSIS — Z12.31 SCREENING MAMMOGRAM, ENCOUNTER FOR: ICD-10-CM

## 2025-06-25 DIAGNOSIS — M54.50 ACUTE BILATERAL LOW BACK PAIN WITHOUT SCIATICA: ICD-10-CM

## 2025-06-25 DIAGNOSIS — R10.2 PELVIC PAIN: ICD-10-CM

## 2025-06-25 DIAGNOSIS — R35.0 FREQUENCY OF MICTURITION: Primary | ICD-10-CM

## 2025-06-25 PROCEDURE — 77063 BREAST TOMOSYNTHESIS BI: CPT | Mod: 26,,, | Performed by: RADIOLOGY

## 2025-06-25 PROCEDURE — 3080F DIAST BP >= 90 MM HG: CPT | Mod: CPTII,S$GLB,,

## 2025-06-25 PROCEDURE — 76856 US EXAM PELVIC COMPLETE: CPT | Mod: 26,,, | Performed by: RADIOLOGY

## 2025-06-25 PROCEDURE — 99999 PR PBB SHADOW E&M-EST. PATIENT-LVL IV: CPT | Mod: PBBFAC,,,

## 2025-06-25 PROCEDURE — 76856 US EXAM PELVIC COMPLETE: CPT | Mod: TC

## 2025-06-25 PROCEDURE — 77063 BREAST TOMOSYNTHESIS BI: CPT | Mod: TC

## 2025-06-25 PROCEDURE — 3008F BODY MASS INDEX DOCD: CPT | Mod: CPTII,S$GLB,,

## 2025-06-25 PROCEDURE — 3077F SYST BP >= 140 MM HG: CPT | Mod: CPTII,S$GLB,,

## 2025-06-25 PROCEDURE — 77067 SCR MAMMO BI INCL CAD: CPT | Mod: 26,,, | Performed by: RADIOLOGY

## 2025-06-25 PROCEDURE — 1160F RVW MEDS BY RX/DR IN RCRD: CPT | Mod: CPTII,S$GLB,,

## 2025-06-25 PROCEDURE — 76830 TRANSVAGINAL US NON-OB: CPT | Mod: 26,,, | Performed by: RADIOLOGY

## 2025-06-25 PROCEDURE — 4010F ACE/ARB THERAPY RXD/TAKEN: CPT | Mod: CPTII,S$GLB,,

## 2025-06-25 PROCEDURE — 1159F MED LIST DOCD IN RCRD: CPT | Mod: CPTII,S$GLB,,

## 2025-06-25 PROCEDURE — 99213 OFFICE O/P EST LOW 20 MIN: CPT | Mod: S$GLB,,,

## 2025-06-25 NOTE — PROGRESS NOTES
Gynecology Visit  History & Physical      SUBJECTIVE:     History of Present Illness:  Patient is a 59 y.o. female presents with complaints of foul-smelling urine and lower back pain.  Patient reports low back pain started approximately 2 months ago and describes as sharp and cramping in nature.  Odor to urine was noticed approximately 1 week ago.  Patient does report frequency, denies any dysuria, fever, chills, or CVA tenderness.      Chief Complaint   Patient presents with    Urinary Tract Infection     Pt states she has a strong odor from urine and lower back pain at night        Review of patient's allergies indicates:   Allergen Reactions    Amoxicillin Hives    Iodinated contrast media Swelling and Nausea And Vomiting       Current Medications[1]  OB History          5    Para   5    Term   5            AB        Living   6         SAB        IAB        Ectopic        Multiple   1    Live Births   6             No LMP recorded. Patient is postmenopausal.      Past Medical History:   Diagnosis Date    Abnormal Pap smear of cervix     Aneurysm     Family history of breast cancer     Hypertension     Parathyroid disease     Stroke     Lt sided weakness     Past Surgical History:   Procedure Laterality Date     SECTION      x 4    COLONOSCOPY N/A 2016    Procedure: COLONOSCOPY;  Surgeon: Gio Bettencourt MD;  Location: [facility] ([address] [place] [address]);  Service: Endoscopy;  Laterality: N/A;    COLONOSCOPY N/A 2017    Procedure: COLONOSCOPY;  Surgeon: Gio Bettencourt MD;  [facility]  Service: Endoscopy;  Laterality: N/A;    COLONOSCOPY N/A 3/10/2020    Procedure: COLONOSCOPY;  Surgeon: KIM Miranda MD;  Location: [facility] ENDO ([address] [place] [address]);  Service: Endoscopy;  Laterality: N/A;  cva-right side paralysis-tb    TUBAL LIGATION       Family History   Problem Relation Name Age of Onset    Breast cancer Paternal Grandmother      Breast cancer Father  83    Diabetes type II  "Mother      Kidney disease Mother          on HD due to diabetes    Hypertension Mother      Diabetes Mother      Diabetes Brother      Lung cancer Maternal Aunt      Diabetes Maternal Aunt      Diabetes Maternal Aunt      Diabetes Maternal Aunt      Diabetes Maternal Aunt      Diabetes Maternal Uncle      Diabetes Maternal Uncle      Diabetes Maternal Uncle      Diabetes Maternal Uncle      Breast cancer Paternal Aunt  17    Breast cancer Paternal Uncle      Colon cancer Paternal Uncle      Uterine cancer Neg Hx      Ovarian cancer Neg Hx       Social History[2]     OBJECTIVE:     Vital Signs (Most Recent)  Pulse: 74 (06/25/25 0922)  BP: (!) 146/101 (06/25/25 0922)  5' 4" (1.626 m)  89.8 kg (198 lb)     Physical Exam:  Physical Exam  Vitals and nursing note reviewed.   Constitutional:       General: She is awake.   Pulmonary:      Effort: Pulmonary effort is normal.   Abdominal:      Tenderness: There is no right CVA tenderness or left CVA tenderness.   Skin:     General: Skin is warm and dry.   Neurological:      Mental Status: She is alert and oriented to person, place, and time.   Psychiatric:         Attention and Perception: Attention and perception normal.         Mood and Affect: Mood and affect normal.         Speech: Speech normal.         Behavior: Behavior normal. Behavior is cooperative.         Thought Content: Thought content normal.         Cognition and Memory: Cognition normal.         Judgment: Judgment normal.       Laboratory  Urinalysis: Reviewed  RBC: negative, Leukocytes: negative, Nitrites: Negative, Ketones: Negative Protein: 2+      ASSESSMENT/PLAN:       ICD-10-CM ICD-9-CM   1. Frequency of micturition  R35.0 788.41   2. Pelvic pain  R10.2 [id number]   3. Acute bilateral low back pain without sciatica  M54.50 724.2     338.19       PLAN:  Urinalysis performed, results reviewed with patient  We will send urine for culture and follow-up results  Pelvic ultrasound ordered, scheduled for " 06/26/2025  Last Pap: approximate date 03/2023 and was normal  Last mammo: approximate date 01/2024 and was normal; patient scheduled for mammo today  Annual scheduled for tomorrow    Thank you for allowing me to participate in your care today. It is an honor to be a part of your healthcare team at [facility]. If you have any questions or concerns regarding your visit today, please do not hesitate to contact us.    Rosy Galeas, [facility]-BC  Gynecology    [address] [place] [address]    [phone]          [1]   Current Outpatient Medications   Medication Sig Dispense Refill    alcohol swabs (BD ALCOHOL SWABS) PadM Apply 1 each topically 2 hours after meals and at bedtime. 100 each 3    amLODIPine (NORVASC) 10 MG tablet TAKE 1 TABLET(10 MG) BY MOUTH EVERY DAY 90 tablet 1    aspirin (ECOTRIN) 81 MG EC tablet Take 81 mg by mouth once daily.      blood sugar diagnostic (TRUE METRIX GLUCOSE TEST STRIP) Strp Inject 1 each into the skin before breakfast. 100 each 3    blood-glucose meter (TRUE METRIX AIR GLUCOSE METER) kit Check fasting sugar daily 1 each 0    CYANOCOBALAMIN, VITAMIN B-12, (VITAMIN B-12 ORAL) Take 1 tablet by mouth once daily.      GAVILYTE-G 236-22.74-6.74 -5.86 gram suspension       HYDROcodone-acetaminophen (NORCO) 7.5-325 mg per tablet Take 1 tablet by mouth every 6 (six) hours as needed for Pain. 12 tablet 0    irbesartan (AVAPRO) 150 MG tablet Take 1 tablet (150 mg total) by mouth once daily. 90 tablet 0    KRILL OIL ORAL Take 1 capsule by mouth once daily.      lancets (TRUEPLUS LANCETS) 33 gauge Misc Test once daily 100 each 3    LIDOcaine (LIDODERM) 5 % Place 1 patch onto the skin daily as needed (pain). Remove & Discard patch within 12 hours or as directed by MD 30 patch 1    LUTEIN ORAL Take by mouth once daily.      MULTIVITAMIN W-MINERALS/LUTEIN (CENTRUM SILVER ORAL) Take 1 tablet by mouth once daily.      ondansetron (ZOFRAN) 4 MG tablet Take 1 tablet (4 mg total) by mouth every 6  (six) hours as needed for Nausea. 12 tablet 0    rosuvastatin (CRESTOR) 10 MG tablet Take 10 mg by mouth once daily.      walker Misc 1 each by Misc.(Non-Drug; Combo Route) route daily as needed. 1 each 0     No current facility-administered medications for this visit.   [2]   Social History  Tobacco Use    Smoking status: Never     Passive exposure: Past    Smokeless tobacco: Never   Substance Use Topics    Alcohol use: Yes     Comment: 2x per week     Drug use: No

## 2025-06-25 NOTE — LETTER
[facility]ne[facility]  [address], [address]  [place] [address]  P: [phone]   F: [phone]     DEB Davis      [facility]    P: [phone]  F: [phone]  [facility]    P: [phone]   F: [phone]  [facility]   P: [phone]   F: [phone]  [facility]   P: [phone]   F: [phone]  [facility]   P: [phone]   F: [phone]  [facility]    P: [phone]   F: [phone]       Patient: Caitlyn Perez   YOB: 1965   Date of Visit: 6/25/2025         Mammogram, bilateral:  Routine/screening  3D if covered by insurance        Diagnosis:   Z01. 419           Z12.31                          DEB Abbott          NPI: [id number]

## 2025-06-26 ENCOUNTER — OFFICE VISIT (OUTPATIENT)
Dept: OBSTETRICS AND GYNECOLOGY | Facility: CLINIC | Age: 60
End: 2025-06-26
Payer: MEDICARE

## 2025-06-26 ENCOUNTER — PATIENT MESSAGE (OUTPATIENT)
Dept: GASTROENTEROLOGY | Facility: CLINIC | Age: 60
End: 2025-06-26
Payer: MEDICARE

## 2025-06-26 VITALS
SYSTOLIC BLOOD PRESSURE: 166 MMHG | BODY MASS INDEX: 33.46 KG/M2 | HEIGHT: 64 IN | DIASTOLIC BLOOD PRESSURE: 104 MMHG | HEART RATE: 77 BPM | WEIGHT: 196 LBS

## 2025-06-26 DIAGNOSIS — Z01.419 WELL WOMAN EXAM WITH ROUTINE GYNECOLOGICAL EXAM: Primary | ICD-10-CM

## 2025-06-26 DIAGNOSIS — N89.8 VAGINAL ODOR: ICD-10-CM

## 2025-06-26 DIAGNOSIS — C18.0 MALIGNANT NEOPLASM OF CECUM: ICD-10-CM

## 2025-06-26 DIAGNOSIS — R93.89 ABNORMAL FINDING ON ULTRASOUND: ICD-10-CM

## 2025-06-26 DIAGNOSIS — Z11.3 ROUTINE SCREENING FOR STI (SEXUALLY TRANSMITTED INFECTION): ICD-10-CM

## 2025-06-26 DIAGNOSIS — Z12.11 SCREENING FOR MALIGNANT NEOPLASM OF COLON: ICD-10-CM

## 2025-06-26 DIAGNOSIS — R10.9 ABDOMINAL PAIN, UNSPECIFIED ABDOMINAL LOCATION: ICD-10-CM

## 2025-06-26 DIAGNOSIS — Z76.89 ESTABLISHING CARE WITH NEW DOCTOR, ENCOUNTER FOR: ICD-10-CM

## 2025-06-26 DIAGNOSIS — Z72.51 HIGH RISK HETEROSEXUAL BEHAVIOR: ICD-10-CM

## 2025-06-26 PROCEDURE — 87624 HPV HI-RISK TYP POOLED RSLT: CPT

## 2025-06-26 PROCEDURE — 99999 PR PBB SHADOW E&M-EST. PATIENT-LVL V: CPT | Mod: PBBFAC,,,

## 2025-06-26 NOTE — PROGRESS NOTES
HISTORY OF THE PRESENT ILLNESS    06/26/2025  Caitlyn Perez is a 59 y.o. here for annual exam.  Patient denies any breast/bowel/urinary complaints, or abnormal vaginal discharge.  Patient desires STD swab today.  Patient reports remote history of abnormal Paps.  Patient's last mammogram was 6/25/25.  Due for colonoscopy.  Due for annual wellness exam.      LMP: No LMP recorded. Patient is postmenopausal.  Relationship: not sexually active    PAP'S: remote h/o abnormal & cleared to routine surveillance  PAP: PAP neg / Date: 3/14/2023    Results for orders placed or performed in visit on 03/06/23   Liquid-Based Pap Smear, Screening   Result Value Ref Range    Final Pathologic Diagnosis       Specimen Adequacy  Satisfactory for interpretation.  [place] Category  Negative for intraepithelial lesion or malignancy.  Atrophic smear.  Inflammation present.      Disclaimer       The Pap smear is a screening test that aids in the detection of cervical  cancer and cancer precursors. Both false positive and false negative results  can occur. The test should be used at regular intervals, and positive results  should be confirmed before definitive therapy.  This liquid based specimen is processed using the  or  Thin PrepPAP  System. This specimen has been analyzed by the ThinPrep Imaging System  ([employer]), an automated imaging and review system which assists  the laboratory in evaluating cells on ThinPrep PAP tests. Following automated  imaging, selected fields from every slide are reviewed by a cytotechnologist  and/or pathologist.  Screening was performed at [facility], [address] [place] [address].      Narrative    Release to patient->Immediate       HPV Vaccine: n/a (>45)    Last Mammogram: 6/25/25 Results:  negative, recommend 1-yr f/u    HRT: denies   BLEEDING: occasional spotting     Last colonoscopy: 2020 repeat in 3-5 years   Last DEXA  scan:  osteopenia       GYNECOLOGIC HISTORY  Age of Menarche:12  Age at first pregnancy:21   Age at first live birth:21  Age at Menopause:45     Genital HSV: yes  STD Hx: denies    OBSTETRIC HISTORY  OB History    Para Term  AB Living   5 5 5   6   SAB IAB Ectopic Multiple Live Births      1 6      # Outcome Date GA Lbr Dougie/2nd Weight Sex Type Anes PTL Lv   5 Term            4 Term            3 Term            2 Term            1 Term                PAST MEDICAL HISTORY  -------------------------------------    Abnormal Pap smear of cervix    Aneurysm    Family history of breast cancer    Hypertension    Parathyroid disease    Stroke    Lt sided weakness       PAST SURGICAL HISTORY  ----------------------------     section    x 4    Colonoscopy    Procedure: COLONOSCOPY;  Surgeon: Gio Bettencourt MD;  Location: [address]);  Service: Endoscopy;  Laterality: N/A;    Colonoscopy    Procedure: COLONOSCOPY;  Surgeon: Gio Bettencourt MD;  Location: [facility] ENDO [address]);  Service: Endoscopy;  Laterality: N/A;    Colonoscopy    Procedure: COLONOSCOPY;  Surgeon: KIM Miranda MD;  Location: [facility] ENDO [address]);  Service: Endoscopy;  Laterality: N/A;  cva-right side paralysis-tb    Tubal ligation       ALLERGIES  Review of patient's allergies indicates:   Allergen Reactions    Amoxicillin Hives    Iodinated contrast media Swelling and Nausea And Vomiting       MEDICATIONS  Current Outpatient Medications   Medication Instructions    alcohol swabs (BD ALCOHOL SWABS) PadM 1 each, Topical (Top), 4 times daily after meals & nightly    amLODIPine (NORVASC) 10 MG tablet TAKE 1 TABLET(10 MG) BY MOUTH EVERY DAY    aspirin (ECOTRIN) 81 mg, Daily    blood sugar diagnostic (TRUE METRIX GLUCOSE TEST STRIP) Strp 1 each, Subcutaneous, Before breakfast    blood-glucose meter (TRUE METRIX AIR GLUCOSE METER) kit Check fasting sugar daily    CYANOCOBALAMIN, VITAMIN B-12, (VITAMIN B-12 ORAL) 1 tablet,  "Daily    GAVILYTE-G 236-22.74-6.74 -5.86 gram suspension No dose, route, or frequency recorded.    HYDROcodone-acetaminophen (NORCO) 7.5-325 mg per tablet 1 tablet, Oral, Every 6 hours PRN    irbesartan (AVAPRO) 150 mg, Oral, Daily    KRILL OIL ORAL 1 capsule, Daily    lancets (TRUEPLUS LANCETS) 33 gauge Misc Test once daily    LIDOcaine (LIDODERM) 5 % 1 patch, Transdermal, Daily PRN, Remove & Discard patch within 12 hours or as directed by MD    LUTEIN ORAL Daily    MULTIVITAMIN W-MINERALS/LUTEIN (CENTRUM SILVER ORAL) 1 tablet, Daily    ondansetron (ZOFRAN) 4 mg, Oral, Every 6 hours PRN    rosuvastatin (CRESTOR) 10 mg, Daily    walker Misc 1 each, Misc.(Non-Drug; Combo Route), Daily PRN       SOCIAL HISTORY  Social History[1]   Lives with: son  Occupation: disabled; retired       FAMILY HISTORY  BLEEDING or  CLOTTING DISORDERS: none  BREAST CA: father  UTERINE CA: none  OVARIAN CA: none  COLON CA: uncle    REVIEW OF SYSTEMS  Review of Systems   Musculoskeletal:  Positive for back pain (low).   All other systems reviewed and are negative.    --------------------------------------------------------------------------------------------------------------    PHYSICAL EXAM  VITALS:  Vitals:    06/26/25 0932 06/26/25 0940   BP: (!) 158/90  Comment: pt took BP meds as directed today (!) 166/104   BP Location: Right arm Right forearm   Patient Position: Sitting Sitting   Pulse: 78 77   Weight: 88.9 kg (196 lb)    Height: 5' 4" (1.626 m)    PainSc: 0-No pain    PainLoc: Vagina         Physical Exam:   Constitutional: She is oriented to person, place, and time. Vital signs are normal. She appears well-developed and well-nourished.        Pulmonary/Chest: Effort normal.        Abdominal: Soft and flat.     Genitourinary:    Inguinal canal, urethra, bladder, vagina, uterus, right adnexa, left adnexa and rectum normal.            Pelvic exam was performed with patient in the lithotomy position.   The external female genitalia was " normal.     Labial bartholins normal.Cervix is normal. Vagina was moist.Vaginal atrophy noted.    pap smear completedNormal urethral meatus.   Genitourinary Comments: Skin tag noted to right labia, difficult exam due to body habitus                  Neurological: She is alert and oriented to person, place, and time. She has normal strength.    Skin: Skin is warm and dry.    Psychiatric: She has a normal mood and affect. Her speech is normal and behavior is normal. Mood, affect, judgment and thought content normal.      Exam conducted with a chaperone present.    ASSESSMENT AND PLAN:  Caitlyn was seen today for well woman.    Diagnoses and all orders for this visit:    Well woman exam with routine gynecological exam    Establishing care with new doctor, encounter for  -     Ambulatory referral/consult to Family Practice; Future    Screening for malignant neoplasm of colon  -     Case Request Endoscopy: COLONOSCOPY    Malignant neoplasm of cecum  -     Case Request Endoscopy: COLONOSCOPY    Abdominal pain, unspecified abdominal location  -     CT Abdomen Pelvis  Without Contrast; Future    Abnormal finding on ultrasound  -     CT Abdomen Pelvis  Without Contrast; Future      Cervical Cancer screening: f/u results of PAP / HPV --> if both negative then next screen in 3 yrs  STD screening: swab performed today; will follow up results  Discussed safe sex practices and condom usage  HPV Vaccine: n/a (>45)  Annual wellness labs: referred to PCP to establish care  Mammogram: up to date  Colonoscopy: referral sent   Dexa: not applicable  Order for CT scan placed to follow up abnormal finding on pelvic ultrasound    Patient was counseled today on :  Pap guidelines  Recommend periodic pelvic exams with visual inspection and palpation  mammograms starting annually at age 40  Encouraged self breast awareness; RTC for breast concerns  Colonoscopy after the age of 45  Dexa Bone Scan and calcium and vitamin D supplementation in  menopause and to see her PCP for other health maintenance.     RTC for periodic GYN exam, sooner prn      Thank you for allowing me to participate in your care today. It is an honor to be a part of your healthcare team at [facility]. If you have any questions or concerns regarding your visit today, please do not hesitate to contact us.    Rosy Galeas, [facility]-BC  Gynecology    [address] [place], [place] [id number] [phone]         [1]   Social History  Tobacco Use    Smoking status: Never     Passive exposure: Past    Smokeless tobacco: Never   Substance Use Topics    Alcohol use: Yes     Comment: 2x per week     Drug use: No

## 2025-06-27 ENCOUNTER — RESULTS FOLLOW-UP (OUTPATIENT)
Dept: OBSTETRICS AND GYNECOLOGY | Facility: CLINIC | Age: 60
End: 2025-06-27
Payer: MEDICARE

## 2025-06-27 ENCOUNTER — HOSPITAL ENCOUNTER (OUTPATIENT)
Dept: RADIOLOGY | Facility: HOSPITAL | Age: 60
Discharge: HOME | End: 2025-06-27
Payer: MEDICARE

## 2025-06-27 DIAGNOSIS — R93.89 ABNORMAL FINDING ON ULTRASOUND: ICD-10-CM

## 2025-06-27 DIAGNOSIS — R10.9 ABDOMINAL PAIN, UNSPECIFIED ABDOMINAL LOCATION: ICD-10-CM

## 2025-06-27 DIAGNOSIS — R10.2 PELVIC PAIN: Primary | ICD-10-CM

## 2025-06-27 PROCEDURE — 74176 CT ABD & PELVIS W/O CONTRAST: CPT | Mod: TC

## 2025-06-27 PROCEDURE — 74176 CT ABD & PELVIS W/O CONTRAST: CPT | Mod: 26,,, | Performed by: RADIOLOGY

## 2025-07-01 ENCOUNTER — TELEPHONE (OUTPATIENT)
Dept: FAMILY MEDICINE | Facility: CLINIC | Age: 60
End: 2025-07-01
Payer: MEDICARE

## 2025-07-01 ENCOUNTER — TELEPHONE (OUTPATIENT)
Dept: GASTROENTEROLOGY | Facility: CLINIC | Age: 60
End: 2025-07-01
Payer: MEDICARE

## 2025-07-01 NOTE — LETTER
July 1, 2025    Caitlyn Perez  [address] [place] [address]             [facility]  [address]  [facility] [facility] [place] [address]  Phone: [phone]  Fax: [phone] Dear: Ms. Caitlyn Perez,    Our efforts to reach you by telephone have been unsuccessful regarding an appointment you scheduled with Rhonda Cao MD. Your appointment which was scheduled for 07/07/25 has been moved because the provider will not be available at this time. Please contact us at [phone] to reschedule this appointment.    We apologize for any inconvenience this may have caused you and appreciate your continued support of [facility].    Sincerely,  Evelin Andrade  [facility]

## 2025-07-01 NOTE — TELEPHONE ENCOUNTER
Called patient to schedule screening colonoscopy she states she also wants upper done schedule for office with NP on 7/16 at 1030am per patient request.

## 2025-07-01 NOTE — TELEPHONE ENCOUNTER
Spoke with pt to reschedule appointment on 7/7, pt states she will call back to reschedule and ended call. Appointment will be canceled

## 2025-07-16 ENCOUNTER — HOSPITAL ENCOUNTER (OUTPATIENT)
Dept: RADIOLOGY | Facility: HOSPITAL | Age: 60
Discharge: HOME | End: 2025-07-16
Payer: MEDICARE

## 2025-07-16 ENCOUNTER — OFFICE VISIT (OUTPATIENT)
Dept: GASTROENTEROLOGY | Facility: CLINIC | Age: 60
End: 2025-07-16
Payer: MEDICARE

## 2025-07-16 VITALS
SYSTOLIC BLOOD PRESSURE: 195 MMHG | RESPIRATION RATE: 17 BRPM | HEIGHT: 64 IN | HEART RATE: 79 BPM | OXYGEN SATURATION: 99 % | WEIGHT: 195.75 LBS | BODY MASS INDEX: 33.42 KG/M2 | DIASTOLIC BLOOD PRESSURE: 101 MMHG

## 2025-07-16 DIAGNOSIS — R10.2 PELVIC PAIN: ICD-10-CM

## 2025-07-16 DIAGNOSIS — K62.5 RECTAL BLEEDING: ICD-10-CM

## 2025-07-16 DIAGNOSIS — R79.89 ELEVATED LFTS: ICD-10-CM

## 2025-07-16 DIAGNOSIS — R93.89 ABNORMAL FINDING ON ULTRASOUND: ICD-10-CM

## 2025-07-16 DIAGNOSIS — K80.20 GALLSTONES: ICD-10-CM

## 2025-07-16 DIAGNOSIS — K59.04 CHRONIC IDIOPATHIC CONSTIPATION: Primary | ICD-10-CM

## 2025-07-16 DIAGNOSIS — Z86.0100 HISTORY OF COLON POLYPS: ICD-10-CM

## 2025-07-16 DIAGNOSIS — K21.9 GASTROESOPHAGEAL REFLUX DISEASE, UNSPECIFIED WHETHER ESOPHAGITIS PRESENT: ICD-10-CM

## 2025-07-16 DIAGNOSIS — R13.10 DYSPHAGIA, UNSPECIFIED TYPE: ICD-10-CM

## 2025-07-16 PROBLEM — E66.01 SEVERE OBESITY (BMI 35.0-39.9) WITH COMORBIDITY: Status: RESOLVED | Noted: 2020-01-24 | Resolved: 2025-07-16

## 2025-07-16 PROCEDURE — 3077F SYST BP >= 140 MM HG: CPT | Mod: CPTII,S$GLB,,

## 2025-07-16 PROCEDURE — 3080F DIAST BP >= 90 MM HG: CPT | Mod: CPTII,S$GLB,,

## 2025-07-16 PROCEDURE — 99999 PR PBB SHADOW E&M-EST. PATIENT-LVL IV: CPT | Mod: PBBFAC,,,

## 2025-07-16 PROCEDURE — 72197 MRI PELVIS W/O & W/DYE: CPT | Mod: TC,FY

## 2025-07-16 PROCEDURE — 99204 OFFICE O/P NEW MOD 45 MIN: CPT | Mod: S$GLB,,,

## 2025-07-16 PROCEDURE — 4010F ACE/ARB THERAPY RXD/TAKEN: CPT | Mod: CPTII,S$GLB,,

## 2025-07-16 PROCEDURE — 72197 MRI PELVIS W/O & W/DYE: CPT | Mod: 26,,, | Performed by: RADIOLOGY

## 2025-07-16 PROCEDURE — 25500020 PHARM REV CODE 255

## 2025-07-16 PROCEDURE — A9585 GADOBUTROL INJECTION: HCPCS

## 2025-07-16 PROCEDURE — 1159F MED LIST DOCD IN RCRD: CPT | Mod: CPTII,S$GLB,,

## 2025-07-16 PROCEDURE — 3008F BODY MASS INDEX DOCD: CPT | Mod: CPTII,S$GLB,,

## 2025-07-16 PROCEDURE — 1160F RVW MEDS BY RX/DR IN RCRD: CPT | Mod: CPTII,S$GLB,,

## 2025-07-16 RX ORDER — GADOBUTROL 604.72 MG/ML
9 INJECTION INTRAVENOUS
Status: COMPLETED | OUTPATIENT
Start: 2025-07-16 | End: 2025-07-16

## 2025-07-16 RX ADMIN — GADOBUTROL 9 ML: 604.72 INJECTION INTRAVENOUS at 03:07

## 2025-07-16 NOTE — PROGRESS NOTES
Subjective:       Patient ID: Caitlyn Perez is a 60 y.o. female Body mass index is 33.6 kg/m².    Chief Complaint: Gastroesophageal Reflux, Abdominal Pain, Nausea, and Emesis    This patient is new to me.  Referring Provider: Rosalinda Carrion* for constipation.  Established patient of Dr. Miranda.     GI Problem  The primary symptoms include hematochezia (chronic intermittent rectal bleeding last episode 3 weeks ago states will see large amounts in toilet when strianing to have a BM). Primary symptoms do not include fever, weight loss, fatigue, abdominal pain (denies any abdominal pain), nausea, vomiting, diarrhea, melena, hematemesis, jaundice, dysuria, myalgias, arthralgias or rash.   The illness is also significant for dysphagia (pt reports dyspahgia with bread, meats, chicken this occured since her stroke many years ago, no issues with pills or liquids no hx of EGD with dilation) and constipation (chronic constipation, has a bm once a week, strains has hard stools, takes dulcolax prn drinks a lot water). The illness does not include odynophagia or bloating. Associated symptoms comments: Last colonoscopy in 2020 hx of colon polyps patho report: Tubular adenoma. Denies family hx of colon cancer. Significant associated medical issues include GERD and gallstones (gallstones seen on CT of abdomen denies upper abdominal pain n/v). Associated medical issues do not include inflammatory bowel disease, liver disease, alcohol abuse, PUD, gastric bypass, bowel resection, irritable bowel syndrome, hemorrhoids or diverticulitis.       Review of Systems   Constitutional:  Negative for fatigue, fever and weight loss.   Gastrointestinal:  Positive for anal bleeding, constipation (chronic constipation, has a bm once a week, strains has hard stools, takes dulcolax prn drinks a lot water), dysphagia (pt reports dyspahgia with bread, meats, chicken this occured since her stroke many years ago, no issues with pills or liquids  no hx of EGD with dilation) and hematochezia (chronic intermittent rectal bleeding last episode 3 weeks ago states will see large amounts in toilet when strianing to have a BM). Negative for abdominal distention, abdominal pain (denies any abdominal pain), bloating, blood in stool, diarrhea, hematemesis, jaundice, melena, nausea, rectal pain and vomiting.   Genitourinary:  Negative for dysuria.   Musculoskeletal:  Negative for arthralgias and myalgias.   Skin:  Negative for rash.         No LMP recorded. Patient is postmenopausal.  Past Medical History:   Diagnosis Date    Abnormal Pap smear of cervix     Aneurysm     Family history of breast cancer     Hypertension     Parathyroid disease     Stroke     Lt sided weakness     Past Surgical History:   Procedure Laterality Date     SECTION      x 4    COLONOSCOPY N/A 2016    Procedure: COLONOSCOPY;  Surgeon: Gio Bettencourt MD;  Location: [facility] ([facility]);  Service: Endoscopy;  Laterality: N/A;    COLONOSCOPY N/A 2017    Procedure: COLONOSCOPY;  Surgeon: Gio Bettencourt MD;  Location: [facility] ([facility]);  Service: Endoscopy;  Laterality: N/A;    COLONOSCOPY N/A 03/10/2020    Procedure: COLONOSCOPY;  Surgeon: KIM Miranda MD;  Location: [facility] ENDO ([facility]);  Service: Endoscopy;  Laterality: N/A;  cva-right side paralysis-tb    TUBAL LIGATION       Family History   Problem Relation Name Age of Onset    Diabetes type II Mother      Kidney disease Mother          on HD due to diabetes    Hypertension Mother      Diabetes Mother      Breast cancer Father  83    Lung cancer Maternal Aunt      Diabetes Maternal Aunt      Diabetes Maternal Aunt      Diabetes Maternal Aunt      Diabetes Maternal Aunt      Diabetes Maternal Uncle      Diabetes Maternal Uncle      Diabetes Maternal Uncle      Diabetes Maternal Uncle      Breast cancer Paternal Aunt  17    Colon cancer Paternal Uncle      Breast cancer Paternal Grandmother      Diabetes Brother       Uterine cancer Neg Hx      Ovarian cancer Neg Hx       Social History[1]  Wt Readings from Last 10 Encounters:   07/16/25 88.8 kg (195 lb 12.3 oz)   06/26/25 88.9 kg (196 lb)   06/25/25 89.8 kg (198 lb)   08/07/24 90.7 kg (199 lb 15.3 oz)   08/04/24 90.7 kg (200 lb)   03/06/23 95.5 kg (210 lb 9.6 oz)   08/25/21 96.4 kg (212 lb 8.4 oz)   07/15/20 94.5 kg (208 lb 5.4 oz)   03/13/20 93.9 kg (207 lb 0.2 oz)   03/10/20 95.3 kg (210 lb)     Lab Results   Component Value Date    WBC 8.31 08/04/2024    HGB 12.0 08/04/2024    HCT 37.1 08/04/2024    MCV 85 08/04/2024     08/04/2024     CMP  Sodium   Date Value Ref Range Status   08/04/2024 141 136 - 145 mmol/L Final     Potassium   Date Value Ref Range Status   08/04/2024 3.9 3.5 - 5.1 mmol/L Final     Chloride   Date Value Ref Range Status   08/04/2024 109 95 - 110 mmol/L Final     CO2   Date Value Ref Range Status   08/04/2024 23 23 - 29 mmol/L Final     Glucose   Date Value Ref Range Status   08/04/2024 110 70 - 110 mg/dL Final     BUN   Date Value Ref Range Status   08/04/2024 16 6 - 20 mg/dL Final     Creatinine   Date Value Ref Range Status   08/04/2024 1.1 0.5 - 1.4 mg/dL Final     Calcium   Date Value Ref Range Status   08/04/2024 10.5 8.7 - 10.5 mg/dL Final     Total Protein   Date Value Ref Range Status   08/04/2024 6.7 6.0 - 8.4 g/dL Final     Albumin   Date Value Ref Range Status   08/04/2024 3.5 3.5 - 5.2 g/dL Final     Total Bilirubin   Date Value Ref Range Status   08/04/2024 0.3 0.1 - 1.0 mg/dL Final     Comment:     For infants and newborns, interpretation of results should be based  on gestational age, weight and in agreement with clinical  observations.    Premature Infant recommended reference ranges:  Up to 24 hours.............<8.0 mg/dL  Up to 48 hours............<12.0 mg/dL  3-5 days..................<15.0 mg/dL  6-29 days.................<15.0 mg/dL       Alkaline Phosphatase   Date Value Ref Range Status   08/04/2024 166 (H) 55 - 135 U/L  "Final     AST   Date Value Ref Range Status   08/04/2024 54 (H) 10 - 40 U/L Final     ALT   Date Value Ref Range Status   08/04/2024 62 (H) 10 - 44 U/L Final     Anion Gap   Date Value Ref Range Status   08/04/2024 9 8 - 16 mmol/L Final     eGFR if    Date Value Ref Range Status   02/21/2020 >60 >60 mL/min/1.73 m^2 Final     eGFR if non    Date Value Ref Range Status   02/21/2020 57 (A) >60 mL/min/1.73 m^2 Final     Comment:     Calculation used to obtain the estimated glomerular filtration  rate (eGFR) is the CKD-EPI equation.        Lab Results   Component Value Date    LIPASE 32 08/16/2023     No results found for: "LIPASERES"  Lab Results   Component Value Date    TSH 1.786 01/21/2020       Reviewed prior medical records including radiology report of CT abdomen pelvis 6/27/25 & endoscopy history (see surgical history/procedures).    Objective:      Physical Exam  Vitals and nursing note reviewed.   Constitutional:       Appearance: Normal appearance. She is normal weight.   Cardiovascular:      Rate and Rhythm: Normal rate and regular rhythm.      Heart sounds: Normal heart sounds.   Pulmonary:      Breath sounds: Normal breath sounds.   Abdominal:      General: Bowel sounds are normal.      Palpations: Abdomen is soft.      Tenderness: There is no abdominal tenderness.   Skin:     General: Skin is warm and dry.      Coloration: Skin is not jaundiced.   Neurological:      Mental Status: She is alert and oriented to person, place, and time.   Psychiatric:         Mood and Affect: Mood normal.         Behavior: Behavior normal.       Assessment:       1. Chronic idiopathic constipation    2. Rectal bleeding    3. History of colon polyps    4. Gastroesophageal reflux disease, unspecified whether esophagitis present    5. Dysphagia, unspecified type    6. Elevated LFTs    7. Gallstones        Plan:       Chronic idiopathic constipation   -Recommend daily exercise as tolerated, " adequate water intake (six 8-oz glasses of water daily), and high fiber diet. OTC fiber supplements are recommended if diet does not reach daily fiber goal (20-30 grams daily), such as Metamucil, Citrucel, or FiberCon (take as directed, separate from other oral medications by >2 hours).  -Recommend trying OTC MiraLax once daily (17g PO) as directed  -If no improvement with above recommendations, try intermittently dosed Dulcolax OTC as directed (every 3-4 days) PRN to facilitate bowel movements  -If no relief with this, consider adding a emollient laxative (castor oil or mineral oil) +/- enema  -If still no improvement with these measures, call/follow-up   -recommended linzess would like to try regimen above first    Rectal bleeding   - schedule Colonoscopy, discussed procedure with the patient, including risks and benefits, patient verbalized understanding  - discussed about different etiologies that can cause rectal bleeding, such as but not limited to diverticulosis, polyps, colon mass, colon inflammation or infection, anal fissure or hemorrhoids.     History of colon polyps   - schedule Colonoscopy, discussed procedure with the patient, including risks and benefits, patient verbalized understanding    Gastroesophageal reflux disease, unspecified whether esophagitis present  -- schedule EGD, discussed procedure with patient, including risks and benefits, patient verbalized understanding  -would like to complete EGD prior to starting medication  -Educated patient on lifestyle modifications to help control/reduce reflux/abdominal pain including: avoid large meals, avoid eating within 2-3 hours of bedtime (avoid late night eating & lying down soon after eating), elevate head of bed if nocturnal symptoms are present, smoking cessation (if current smoker), & weight loss (if overweight).   -Educated to avoid known foods which trigger reflux symptoms & to minimize/avoid high-fat foods, chocolate, caffeine, citrus,  alcohol, & tomato products.  -Advised to avoid/limit use of NSAID's, since they can cause GI upset, bleeding, and/or ulcers. If needed, take with food.     Dysphagia, unspecified type   - schedule EGD, discussed procedure with patient and possible esophageal dilation may be performed during procedure if indicated, patient verbalized understanding  - recommend to eat smaller more frequent meals and to eat slowly and advised to eat a soft diet. Take medications one at a time with a full glass of water.  - possible UGI/esophagram/esophageal manometry if symptoms persist    Elevated LFTs  -     Hepatic Function Panel; Future; Expected date: 08/06/2025    minimize/avoid alcohol and tylenol products    Gallstones  - recommend low fat diet  - discussed diagnosis & that it can cause symptoms in some patients, while other patients with it can be asymptomatic; if symptoms occur (abdominal pain, n/v) recommend seeing general surgery for further evaluation and management, patient verbalized understanding      Follow up if symptoms worsen or fail to improve.      If no improvement in symptoms or symptoms worsen, call/follow-up at clinic or go to ER.       [facility] - GASTROENTEROLOGY  [facility], [place]     Dictation software program was used for this note. Please expect some simple typographical  errors in this note.    Encounter includes face to face time and non-face to face time preparing to see the patient (eg, review of tests), obtaining and/or reviewing separately obtained history, documenting clinical information in the electronic or other health record, independently interpreting results (not separately reported) and communicating results to the patient/family/caregiver, or care coordination (not separately reported).               [1]   Social History  Tobacco Use    Smoking status: Never     Passive exposure: Past    Smokeless tobacco: Never   Substance Use Topics    Alcohol use: Yes      Comment: 2x per week     Drug use: No

## 2025-07-17 ENCOUNTER — RESULTS FOLLOW-UP (OUTPATIENT)
Dept: OBSTETRICS AND GYNECOLOGY | Facility: CLINIC | Age: 60
End: 2025-07-17
Payer: MEDICARE

## 2025-07-23 ENCOUNTER — TELEPHONE (OUTPATIENT)
Dept: PODIATRY | Facility: CLINIC | Age: 60
End: 2025-07-23
Payer: MEDICARE

## 2025-08-14 ENCOUNTER — HOSPITAL ENCOUNTER (OUTPATIENT)
Facility: HOSPITAL | Age: 60
Discharge: HOME | End: 2025-08-14
Attending: INTERNAL MEDICINE | Admitting: INTERNAL MEDICINE
Payer: MEDICARE

## 2025-08-14 ENCOUNTER — ANESTHESIA EVENT (OUTPATIENT)
Dept: ENDOSCOPY | Facility: HOSPITAL | Age: 60
End: 2025-08-14
Payer: MEDICARE

## 2025-08-14 ENCOUNTER — ANESTHESIA (OUTPATIENT)
Dept: ENDOSCOPY | Facility: HOSPITAL | Age: 60
End: 2025-08-14
Payer: MEDICARE

## 2025-08-14 VITALS
BODY MASS INDEX: 32.44 KG/M2 | TEMPERATURE: 98 F | OXYGEN SATURATION: 95 % | SYSTOLIC BLOOD PRESSURE: 118 MMHG | RESPIRATION RATE: 16 BRPM | WEIGHT: 190 LBS | DIASTOLIC BLOOD PRESSURE: 70 MMHG | HEIGHT: 64 IN | HEART RATE: 94 BPM

## 2025-08-14 DIAGNOSIS — K62.5 RECTAL BLEEDING: ICD-10-CM

## 2025-08-14 PROCEDURE — 27201089 HC SNARE, DISP (ANY): Performed by: INTERNAL MEDICINE

## 2025-08-14 PROCEDURE — 27201012 HC FORCEPS, HOT/COLD, DISP: Performed by: INTERNAL MEDICINE

## 2025-08-14 PROCEDURE — 45385 COLONOSCOPY W/LESION REMOVAL: CPT | Mod: ,,, | Performed by: INTERNAL MEDICINE

## 2025-08-14 PROCEDURE — 25000003 PHARM REV CODE 250: Performed by: INTERNAL MEDICINE

## 2025-08-14 PROCEDURE — 88305 TISSUE EXAM BY PATHOLOGIST: CPT | Mod: TC,59 | Performed by: PATHOLOGY

## 2025-08-14 PROCEDURE — 43249 ESOPH EGD DILATION <30 MM: CPT | Performed by: INTERNAL MEDICINE

## 2025-08-14 PROCEDURE — C1726 CATH, BAL DIL, NON-VASCULAR: HCPCS | Performed by: INTERNAL MEDICINE

## 2025-08-14 PROCEDURE — 45385 COLONOSCOPY W/LESION REMOVAL: CPT | Performed by: INTERNAL MEDICINE

## 2025-08-14 PROCEDURE — 37000008 HC ANESTHESIA 1ST 15 MINUTES: Performed by: INTERNAL MEDICINE

## 2025-08-14 PROCEDURE — 43239 EGD BIOPSY SINGLE/MULTIPLE: CPT | Mod: XS,,, | Performed by: INTERNAL MEDICINE

## 2025-08-14 PROCEDURE — 43249 ESOPH EGD DILATION <30 MM: CPT | Mod: 51,,, | Performed by: INTERNAL MEDICINE

## 2025-08-14 PROCEDURE — 63600175 PHARM REV CODE 636 W HCPCS: Performed by: ANESTHESIOLOGY

## 2025-08-14 PROCEDURE — 37000009 HC ANESTHESIA EA ADD 15 MINS: Performed by: INTERNAL MEDICINE

## 2025-08-14 PROCEDURE — 63600175 PHARM REV CODE 636 W HCPCS: Performed by: NURSE ANESTHETIST, CERTIFIED REGISTERED

## 2025-08-14 PROCEDURE — 43239 EGD BIOPSY SINGLE/MULTIPLE: CPT | Mod: XS | Performed by: INTERNAL MEDICINE

## 2025-08-14 RX ORDER — SODIUM CHLORIDE 9 MG/ML
INJECTION, SOLUTION INTRAVENOUS CONTINUOUS
Status: DISCONTINUED | OUTPATIENT
Start: 2025-08-14 | End: 2025-08-14 | Stop reason: HOSPADM

## 2025-08-14 RX ORDER — PROPOFOL 10 MG/ML
VIAL (ML) INTRAVENOUS
Status: DISCONTINUED | OUTPATIENT
Start: 2025-08-14 | End: 2025-08-14

## 2025-08-14 RX ORDER — GLYCOPYRROLATE 0.2 MG/ML
INJECTION INTRAMUSCULAR; INTRAVENOUS
Status: DISCONTINUED | OUTPATIENT
Start: 2025-08-14 | End: 2025-08-14

## 2025-08-14 RX ORDER — LIDOCAINE HYDROCHLORIDE 20 MG/ML
INJECTION INTRAVENOUS
Status: DISCONTINUED | OUTPATIENT
Start: 2025-08-14 | End: 2025-08-14

## 2025-08-14 RX ORDER — ONDANSETRON HYDROCHLORIDE 2 MG/ML
4 INJECTION, SOLUTION INTRAVENOUS ONCE
Status: COMPLETED | OUTPATIENT
Start: 2025-08-14 | End: 2025-08-14

## 2025-08-14 RX ORDER — OMEPRAZOLE 40 MG/1
CAPSULE, DELAYED RELEASE ORAL
Qty: 90 CAPSULE | Refills: 3 | Status: SHIPPED | OUTPATIENT
Start: 2025-08-14 | End: 2026-08-14

## 2025-08-14 RX ADMIN — LIDOCAINE HYDROCHLORIDE 75 MG: 20 INJECTION, SOLUTION INTRAVENOUS at 09:08

## 2025-08-14 RX ADMIN — PROPOFOL 40 MG: 10 INJECTION, EMULSION INTRAVENOUS at 09:08

## 2025-08-14 RX ADMIN — GLYCOPYRROLATE 0.2 MG: 0.2 INJECTION INTRAMUSCULAR; INTRAVENOUS at 09:08

## 2025-08-14 RX ADMIN — PROPOFOL 80 MG: 10 INJECTION, EMULSION INTRAVENOUS at 09:08

## 2025-08-14 RX ADMIN — ONDANSETRON 4 MG: 2 INJECTION INTRAMUSCULAR; INTRAVENOUS at 08:08

## 2025-08-14 RX ADMIN — SODIUM CHLORIDE: 9 INJECTION, SOLUTION INTRAVENOUS at 08:08
